# Patient Record
Sex: FEMALE | Race: WHITE | NOT HISPANIC OR LATINO | Employment: UNEMPLOYED | URBAN - METROPOLITAN AREA
[De-identification: names, ages, dates, MRNs, and addresses within clinical notes are randomized per-mention and may not be internally consistent; named-entity substitution may affect disease eponyms.]

---

## 2017-05-31 ENCOUNTER — RECORDS - HEALTHEAST (OUTPATIENT)
Dept: LAB | Facility: CLINIC | Age: 62
End: 2017-05-31

## 2017-06-01 LAB
CHOLEST SERPL-MCNC: 152 MG/DL
FASTING STATUS PATIENT QL REPORTED: NORMAL
HDLC SERPL-MCNC: 51 MG/DL
LDLC SERPL CALC-MCNC: 73 MG/DL
TRIGL SERPL-MCNC: 139 MG/DL

## 2017-09-10 ENCOUNTER — HEALTH MAINTENANCE LETTER (OUTPATIENT)
Age: 62
End: 2017-09-10

## 2018-01-02 ENCOUNTER — RECORDS - HEALTHEAST (OUTPATIENT)
Dept: LAB | Facility: CLINIC | Age: 63
End: 2018-01-02

## 2018-01-02 LAB — INR PPP: 2.2 (ref 0.9–1.1)

## 2018-02-02 ENCOUNTER — RECORDS - HEALTHEAST (OUTPATIENT)
Dept: LAB | Facility: CLINIC | Age: 63
End: 2018-02-02

## 2018-02-02 LAB — INR PPP: 2.4 (ref 0.9–1.1)

## 2018-02-09 ENCOUNTER — RECORDS - HEALTHEAST (OUTPATIENT)
Dept: LAB | Facility: CLINIC | Age: 63
End: 2018-02-09

## 2018-02-09 LAB
ALBUMIN UR-MCNC: NEGATIVE MG/DL
APPEARANCE UR: ABNORMAL
BACTERIA #/AREA URNS HPF: ABNORMAL HPF
BILIRUB UR QL STRIP: NEGATIVE
COLOR UR AUTO: YELLOW
GLUCOSE UR STRIP-MCNC: ABNORMAL MG/DL
HGB UR QL STRIP: ABNORMAL
KETONES UR STRIP-MCNC: NEGATIVE MG/DL
LEUKOCYTE ESTERASE UR QL STRIP: ABNORMAL
MUCOUS THREADS #/AREA URNS LPF: ABNORMAL LPF
NITRATE UR QL: NEGATIVE
PH UR STRIP: 5.5 [PH] (ref 4.5–8)
RBC #/AREA URNS AUTO: ABNORMAL HPF
SP GR UR STRIP: 1.03 (ref 1–1.03)
SQUAMOUS #/AREA URNS AUTO: >100 LPF
UROBILINOGEN UR STRIP-ACNC: ABNORMAL
WBC #/AREA URNS AUTO: ABNORMAL HPF

## 2018-02-11 LAB — BACTERIA SPEC CULT: ABNORMAL

## 2018-02-19 ENCOUNTER — RECORDS - HEALTHEAST (OUTPATIENT)
Dept: LAB | Facility: CLINIC | Age: 63
End: 2018-02-19

## 2018-02-19 LAB — INR PPP: 2.34 (ref 0.9–1.1)

## 2018-03-02 ENCOUNTER — RECORDS - HEALTHEAST (OUTPATIENT)
Dept: LAB | Facility: CLINIC | Age: 63
End: 2018-03-02

## 2018-03-02 LAB — INR PPP: 2.15 (ref 0.9–1.1)

## 2018-04-02 ENCOUNTER — RECORDS - HEALTHEAST (OUTPATIENT)
Dept: LAB | Facility: CLINIC | Age: 63
End: 2018-04-02

## 2018-04-02 LAB — INR PPP: 2.03 (ref 0.9–1.1)

## 2018-04-26 ENCOUNTER — RECORDS - HEALTHEAST (OUTPATIENT)
Dept: LAB | Facility: CLINIC | Age: 63
End: 2018-04-26

## 2018-04-26 LAB
ERYTHROCYTE [DISTWIDTH] IN BLOOD BY AUTOMATED COUNT: 15.3 % (ref 11–14.5)
ERYTHROCYTE [SEDIMENTATION RATE] IN BLOOD BY WESTERGREN METHOD: 122 MM/HR (ref 0–20)
HCT VFR BLD AUTO: 26.9 % (ref 35–47)
HGB BLD-MCNC: 8.1 G/DL (ref 12–16)
INR PPP: 1.75 (ref 0.9–1.1)
MCH RBC QN AUTO: 30.1 PG (ref 27–34)
MCHC RBC AUTO-ENTMCNC: 30.1 G/DL (ref 32–36)
MCV RBC AUTO: 100 FL (ref 80–100)
PLATELET # BLD AUTO: 275 THOU/UL (ref 140–440)
PMV BLD AUTO: 9.1 FL (ref 8.5–12.5)
RBC # BLD AUTO: 2.69 MILL/UL (ref 3.8–5.4)
WBC: 12 THOU/UL (ref 4–11)

## 2018-04-27 ENCOUNTER — RECORDS - HEALTHEAST (OUTPATIENT)
Dept: ADMINISTRATIVE | Facility: OTHER | Age: 63
End: 2018-04-27

## 2018-04-27 ENCOUNTER — RECORDS - HEALTHEAST (OUTPATIENT)
Dept: LAB | Facility: CLINIC | Age: 63
End: 2018-04-27

## 2018-04-27 LAB
ALBUMIN UR-MCNC: NEGATIVE MG/DL
APPEARANCE UR: CLEAR
BACTERIA #/AREA URNS HPF: ABNORMAL HPF
BILIRUB UR QL STRIP: NEGATIVE
COLOR UR AUTO: YELLOW
GLUCOSE UR STRIP-MCNC: ABNORMAL MG/DL
HGB UR QL STRIP: NEGATIVE
KETONES UR STRIP-MCNC: NEGATIVE MG/DL
LEUKOCYTE ESTERASE UR QL STRIP: ABNORMAL
NITRATE UR QL: NEGATIVE
PH UR STRIP: 6 [PH] (ref 4.5–8)
RBC #/AREA URNS AUTO: ABNORMAL HPF
SP GR UR STRIP: 1.02 (ref 1–1.03)
SQUAMOUS #/AREA URNS AUTO: ABNORMAL LPF
UROBILINOGEN UR STRIP-ACNC: ABNORMAL
WBC #/AREA URNS AUTO: ABNORMAL HPF

## 2018-04-30 LAB
BACTERIA SPEC CULT: ABNORMAL
BACTERIA SPEC CULT: ABNORMAL

## 2018-05-01 ENCOUNTER — RECORDS - HEALTHEAST (OUTPATIENT)
Dept: LAB | Facility: CLINIC | Age: 63
End: 2018-05-01

## 2018-05-02 LAB — INR PPP: 1.25 (ref 0.9–1.1)

## 2018-05-07 ENCOUNTER — RECORDS - HEALTHEAST (OUTPATIENT)
Dept: ADMINISTRATIVE | Facility: OTHER | Age: 63
End: 2018-05-07

## 2018-05-21 ENCOUNTER — RECORDS - HEALTHEAST (OUTPATIENT)
Dept: ADMINISTRATIVE | Facility: OTHER | Age: 63
End: 2018-05-21

## 2018-05-22 ENCOUNTER — RECORDS - HEALTHEAST (OUTPATIENT)
Dept: LAB | Facility: CLINIC | Age: 63
End: 2018-05-22

## 2018-05-22 LAB
C REACTIVE PROTEIN LHE: 5.3 MG/DL (ref 0–0.8)
ERYTHROCYTE [SEDIMENTATION RATE] IN BLOOD BY WESTERGREN METHOD: 106 MM/HR (ref 0–20)
WBC: 11 THOU/UL (ref 4–11)

## 2018-05-24 ENCOUNTER — RECORDS - HEALTHEAST (OUTPATIENT)
Dept: ADMINISTRATIVE | Facility: OTHER | Age: 63
End: 2018-05-24

## 2018-05-24 ENCOUNTER — RECORDS - HEALTHEAST (OUTPATIENT)
Dept: LAB | Facility: CLINIC | Age: 63
End: 2018-05-24

## 2018-05-24 LAB
C REACTIVE PROTEIN LHE: 4.1 MG/DL (ref 0–0.8)
ERYTHROCYTE [SEDIMENTATION RATE] IN BLOOD BY WESTERGREN METHOD: 112 MM/HR (ref 0–20)
WBC: 10 THOU/UL (ref 4–11)

## 2018-06-07 ENCOUNTER — RECORDS - HEALTHEAST (OUTPATIENT)
Dept: ADMINISTRATIVE | Facility: OTHER | Age: 63
End: 2018-06-07

## 2018-06-07 ENCOUNTER — OFFICE VISIT - HEALTHEAST (OUTPATIENT)
Dept: VASCULAR SURGERY | Facility: CLINIC | Age: 63
End: 2018-06-07

## 2018-06-07 DIAGNOSIS — J44.9 CHRONIC OBSTRUCTIVE PULMONARY DISEASE, UNSPECIFIED COPD TYPE (H): ICD-10-CM

## 2018-06-07 DIAGNOSIS — G90.09 OTHER IDIOPATHIC PERIPHERAL AUTONOMIC NEUROPATHY: ICD-10-CM

## 2018-06-07 DIAGNOSIS — K74.60 CIRRHOSIS OF LIVER (H): ICD-10-CM

## 2018-06-07 DIAGNOSIS — G89.29 OTHER CHRONIC PAIN: ICD-10-CM

## 2018-06-07 DIAGNOSIS — T81.30XA WOUND DISRUPTION, INITIAL ENCOUNTER: ICD-10-CM

## 2018-06-07 DIAGNOSIS — L98.429 SKIN ULCER OF SACRUM, UNSPECIFIED ULCER STAGE (H): ICD-10-CM

## 2018-06-07 DIAGNOSIS — E11.9 DIABETES (H): ICD-10-CM

## 2018-06-07 DIAGNOSIS — I82.91 CHRONIC EMBOLISM AND THROMBOSIS OF UNSPECIFIED VEIN: ICD-10-CM

## 2018-06-07 DIAGNOSIS — D64.9 ANEMIA: ICD-10-CM

## 2018-06-07 DIAGNOSIS — F32.9 MAJOR DEPRESSIVE DISORDER: ICD-10-CM

## 2018-06-07 DIAGNOSIS — F31.9 BIPOLAR 1 DISORDER (H): ICD-10-CM

## 2018-06-21 ENCOUNTER — RECORDS - HEALTHEAST (OUTPATIENT)
Dept: ADMINISTRATIVE | Facility: OTHER | Age: 63
End: 2018-06-21

## 2018-06-21 ENCOUNTER — OFFICE VISIT - HEALTHEAST (OUTPATIENT)
Dept: VASCULAR SURGERY | Facility: CLINIC | Age: 63
End: 2018-06-21

## 2018-06-21 DIAGNOSIS — F60.9 PERSONALITY DISORDER (H): ICD-10-CM

## 2018-06-21 DIAGNOSIS — G90.09 OTHER IDIOPATHIC PERIPHERAL AUTONOMIC NEUROPATHY: ICD-10-CM

## 2018-06-21 DIAGNOSIS — T14.8XXA LOCAL INFECTION OF WOUND: ICD-10-CM

## 2018-06-21 DIAGNOSIS — F32.9 MAJOR DEPRESSIVE DISORDER: ICD-10-CM

## 2018-06-21 DIAGNOSIS — E66.9 OBESITY (BMI 30-39.9): ICD-10-CM

## 2018-06-21 DIAGNOSIS — L08.9 LOCAL INFECTION OF WOUND: ICD-10-CM

## 2018-06-21 DIAGNOSIS — D64.9 ANEMIA, UNSPECIFIED TYPE: ICD-10-CM

## 2018-06-21 DIAGNOSIS — M54.16 LUMBAR RADICULOPATHY, CHRONIC: ICD-10-CM

## 2018-06-21 DIAGNOSIS — F31.9 BIPOLAR 1 DISORDER (H): ICD-10-CM

## 2018-06-24 LAB
BACTERIA SPEC CULT: ABNORMAL
BACTERIA SPEC CULT: ABNORMAL
BACTERIA SPEC CULT: NORMAL
GRAM STAIN RESULT: ABNORMAL
GRAM STAIN RESULT: ABNORMAL

## 2018-06-25 ENCOUNTER — AMBULATORY - HEALTHEAST (OUTPATIENT)
Dept: VASCULAR SURGERY | Facility: CLINIC | Age: 63
End: 2018-06-25

## 2018-06-25 ENCOUNTER — COMMUNICATION - HEALTHEAST (OUTPATIENT)
Dept: VASCULAR SURGERY | Facility: CLINIC | Age: 63
End: 2018-06-25

## 2018-06-25 DIAGNOSIS — L08.9 LOCAL INFECTION OF WOUND: ICD-10-CM

## 2018-06-25 DIAGNOSIS — T14.8XXA LOCAL INFECTION OF WOUND: ICD-10-CM

## 2018-06-28 ENCOUNTER — OFFICE VISIT - HEALTHEAST (OUTPATIENT)
Dept: VASCULAR SURGERY | Facility: CLINIC | Age: 63
End: 2018-06-28

## 2018-06-28 DIAGNOSIS — D64.9 ANEMIA, UNSPECIFIED TYPE: ICD-10-CM

## 2018-06-28 DIAGNOSIS — F31.9 BIPOLAR 1 DISORDER (H): ICD-10-CM

## 2018-06-28 DIAGNOSIS — F60.9 PERSONALITY DISORDER (H): ICD-10-CM

## 2018-06-28 DIAGNOSIS — E66.9 OBESITY (BMI 30-39.9): ICD-10-CM

## 2018-06-28 DIAGNOSIS — L98.429 SKIN ULCER OF SACRUM, UNSPECIFIED ULCER STAGE (H): ICD-10-CM

## 2018-06-28 DIAGNOSIS — L08.9 LOCAL INFECTION OF WOUND: ICD-10-CM

## 2018-06-28 DIAGNOSIS — E11.9 DIABETES (H): ICD-10-CM

## 2018-06-28 DIAGNOSIS — T14.8XXA LOCAL INFECTION OF WOUND: ICD-10-CM

## 2018-06-28 DIAGNOSIS — T81.30XD WOUND DISRUPTION, SUBSEQUENT ENCOUNTER: ICD-10-CM

## 2018-07-06 ENCOUNTER — RECORDS - HEALTHEAST (OUTPATIENT)
Dept: LAB | Facility: CLINIC | Age: 63
End: 2018-07-06

## 2018-07-09 LAB
C REACTIVE PROTEIN LHE: 2.4 MG/DL (ref 0–0.8)
ERYTHROCYTE [SEDIMENTATION RATE] IN BLOOD BY WESTERGREN METHOD: 94 MM/HR (ref 0–20)
WBC: 9.2 THOU/UL (ref 4–11)

## 2018-07-10 ENCOUNTER — OFFICE VISIT - HEALTHEAST (OUTPATIENT)
Dept: VASCULAR SURGERY | Facility: CLINIC | Age: 63
End: 2018-07-10

## 2018-07-10 DIAGNOSIS — F31.9 BIPOLAR 1 DISORDER (H): ICD-10-CM

## 2018-07-10 DIAGNOSIS — D64.9 ANEMIA, UNSPECIFIED TYPE: ICD-10-CM

## 2018-07-10 DIAGNOSIS — E66.9 OBESITY (BMI 30-39.9): ICD-10-CM

## 2018-07-10 DIAGNOSIS — F60.9 PERSONALITY DISORDER (H): ICD-10-CM

## 2018-07-10 DIAGNOSIS — T81.30XD WOUND DISRUPTION, SUBSEQUENT ENCOUNTER: ICD-10-CM

## 2018-07-10 DIAGNOSIS — E11.9 DIABETES (H): ICD-10-CM

## 2018-07-10 ASSESSMENT — MIFFLIN-ST. JEOR: SCORE: 1468.95

## 2018-07-17 ENCOUNTER — RECORDS - HEALTHEAST (OUTPATIENT)
Dept: LAB | Facility: CLINIC | Age: 63
End: 2018-07-17

## 2018-07-18 LAB
C REACTIVE PROTEIN LHE: 2.2 MG/DL (ref 0–0.8)
ERYTHROCYTE [SEDIMENTATION RATE] IN BLOOD BY WESTERGREN METHOD: 99 MM/HR (ref 0–20)
WBC: 9.2 THOU/UL (ref 4–11)

## 2018-08-02 ENCOUNTER — OFFICE VISIT - HEALTHEAST (OUTPATIENT)
Dept: VASCULAR SURGERY | Facility: CLINIC | Age: 63
End: 2018-08-02

## 2018-08-02 DIAGNOSIS — D64.9 ANEMIA, UNSPECIFIED TYPE: ICD-10-CM

## 2018-08-02 DIAGNOSIS — E66.9 OBESITY (BMI 30-39.9): ICD-10-CM

## 2018-08-02 DIAGNOSIS — E11.9 DIABETES (H): ICD-10-CM

## 2018-08-02 DIAGNOSIS — F31.9 BIPOLAR 1 DISORDER (H): ICD-10-CM

## 2018-08-02 DIAGNOSIS — T81.30XD WOUND DISRUPTION, SUBSEQUENT ENCOUNTER: ICD-10-CM

## 2018-08-23 ENCOUNTER — OFFICE VISIT - HEALTHEAST (OUTPATIENT)
Dept: VASCULAR SURGERY | Facility: CLINIC | Age: 63
End: 2018-08-23

## 2018-08-23 DIAGNOSIS — L08.9 LOCAL INFECTION OF WOUND: ICD-10-CM

## 2018-08-23 DIAGNOSIS — D64.9 ANEMIA, UNSPECIFIED TYPE: ICD-10-CM

## 2018-08-23 DIAGNOSIS — F31.9 BIPOLAR 1 DISORDER (H): ICD-10-CM

## 2018-08-23 DIAGNOSIS — T81.30XD WOUND DISRUPTION, SUBSEQUENT ENCOUNTER: ICD-10-CM

## 2018-08-23 DIAGNOSIS — E66.9 OBESITY (BMI 30-39.9): ICD-10-CM

## 2018-08-23 DIAGNOSIS — E11.9 DIABETES (H): ICD-10-CM

## 2018-08-23 DIAGNOSIS — E66.9 OBESITY: ICD-10-CM

## 2018-08-23 DIAGNOSIS — F60.9 PERSONALITY DISORDER (H): ICD-10-CM

## 2018-08-23 DIAGNOSIS — T14.8XXA LOCAL INFECTION OF WOUND: ICD-10-CM

## 2018-08-26 LAB
BACTERIA SPEC CULT: ABNORMAL
GRAM STAIN RESULT: ABNORMAL
GRAM STAIN RESULT: ABNORMAL

## 2018-08-27 ENCOUNTER — AMBULATORY - HEALTHEAST (OUTPATIENT)
Dept: VASCULAR SURGERY | Facility: CLINIC | Age: 63
End: 2018-08-27

## 2018-08-27 DIAGNOSIS — L08.9 LOCAL INFECTION OF WOUND: ICD-10-CM

## 2018-08-27 DIAGNOSIS — T14.8XXA LOCAL INFECTION OF WOUND: ICD-10-CM

## 2018-08-28 ENCOUNTER — COMMUNICATION - HEALTHEAST (OUTPATIENT)
Dept: VASCULAR SURGERY | Facility: CLINIC | Age: 63
End: 2018-08-28

## 2018-09-05 ENCOUNTER — COMMUNICATION - HEALTHEAST (OUTPATIENT)
Dept: VASCULAR SURGERY | Facility: CLINIC | Age: 63
End: 2018-09-05

## 2018-09-10 ENCOUNTER — RECORDS - HEALTHEAST (OUTPATIENT)
Dept: LAB | Facility: CLINIC | Age: 63
End: 2018-09-10

## 2018-09-10 LAB
C REACTIVE PROTEIN LHE: 1.2 MG/DL (ref 0–0.8)
ERYTHROCYTE [SEDIMENTATION RATE] IN BLOOD BY WESTERGREN METHOD: 71 MM/HR (ref 0–20)
WBC: 9.7 THOU/UL (ref 4–11)

## 2018-09-11 ENCOUNTER — COMMUNICATION - HEALTHEAST (OUTPATIENT)
Dept: VASCULAR SURGERY | Facility: CLINIC | Age: 63
End: 2018-09-11

## 2018-09-13 ENCOUNTER — OFFICE VISIT - HEALTHEAST (OUTPATIENT)
Dept: VASCULAR SURGERY | Facility: CLINIC | Age: 63
End: 2018-09-13

## 2018-09-13 DIAGNOSIS — D64.9 ANEMIA, UNSPECIFIED TYPE: ICD-10-CM

## 2018-09-13 DIAGNOSIS — T81.30XD WOUND DISRUPTION, SUBSEQUENT ENCOUNTER: ICD-10-CM

## 2018-09-13 DIAGNOSIS — E11.9 DIABETES (H): ICD-10-CM

## 2018-09-13 DIAGNOSIS — F31.9 BIPOLAR 1 DISORDER (H): ICD-10-CM

## 2018-09-13 DIAGNOSIS — E66.9 OBESITY (BMI 30-39.9): ICD-10-CM

## 2018-09-13 ASSESSMENT — MIFFLIN-ST. JEOR: SCORE: 1391.83

## 2018-10-04 ENCOUNTER — OFFICE VISIT - HEALTHEAST (OUTPATIENT)
Dept: VASCULAR SURGERY | Facility: CLINIC | Age: 63
End: 2018-10-04

## 2018-10-04 DIAGNOSIS — D64.9 ANEMIA, UNSPECIFIED TYPE: ICD-10-CM

## 2018-10-04 DIAGNOSIS — E11.9 DIABETES (H): ICD-10-CM

## 2018-10-04 DIAGNOSIS — T81.30XD WOUND DISRUPTION, SUBSEQUENT ENCOUNTER: ICD-10-CM

## 2018-10-04 DIAGNOSIS — E66.9 OBESITY (BMI 30-39.9): ICD-10-CM

## 2018-10-04 DIAGNOSIS — F31.9 BIPOLAR 1 DISORDER (H): ICD-10-CM

## 2019-07-03 ENCOUNTER — RECORDS - HEALTHEAST (OUTPATIENT)
Dept: LAB | Facility: CLINIC | Age: 64
End: 2019-07-03

## 2019-07-03 LAB
C REACTIVE PROTEIN LHE: 2.4 MG/DL (ref 0–0.8)
ERYTHROCYTE [DISTWIDTH] IN BLOOD BY AUTOMATED COUNT: 13.1 % (ref 11–14.5)
ERYTHROCYTE [SEDIMENTATION RATE] IN BLOOD BY WESTERGREN METHOD: 47 MM/HR (ref 0–20)
HCT VFR BLD AUTO: 38.4 % (ref 35–47)
HGB BLD-MCNC: 12.6 G/DL (ref 12–16)
MCH RBC QN AUTO: 31 PG (ref 27–34)
MCHC RBC AUTO-ENTMCNC: 32.8 G/DL (ref 32–36)
MCV RBC AUTO: 95 FL (ref 80–100)
PLATELET # BLD AUTO: 101 THOU/UL (ref 140–440)
PMV BLD AUTO: 10.3 FL (ref 8.5–12.5)
RBC # BLD AUTO: 4.06 MILL/UL (ref 3.8–5.4)
WBC: 7.9 THOU/UL (ref 4–11)

## 2019-07-10 ENCOUNTER — RECORDS - HEALTHEAST (OUTPATIENT)
Dept: LAB | Facility: CLINIC | Age: 64
End: 2019-07-10

## 2019-07-13 LAB
BACTERIA SPEC CULT: ABNORMAL
BACTERIA SPEC CULT: ABNORMAL

## 2020-04-30 ENCOUNTER — TRANSFERRED RECORDS (OUTPATIENT)
Dept: HEALTH INFORMATION MANAGEMENT | Facility: CLINIC | Age: 65
End: 2020-04-30

## 2020-05-07 ENCOUNTER — TRANSFERRED RECORDS (OUTPATIENT)
Dept: HEALTH INFORMATION MANAGEMENT | Facility: CLINIC | Age: 65
End: 2020-05-07

## 2020-07-14 ENCOUNTER — TRANSFERRED RECORDS (OUTPATIENT)
Dept: HEALTH INFORMATION MANAGEMENT | Facility: CLINIC | Age: 65
End: 2020-07-14

## 2020-08-31 ENCOUNTER — TELEPHONE (OUTPATIENT)
Dept: DERMATOLOGY | Facility: CLINIC | Age: 65
End: 2020-08-31

## 2020-08-31 NOTE — TELEPHONE ENCOUNTER
I see no referral for this Pt but she has been sent to us with a Dx of skin Cancer based on her Bx results in Care Everywhere.    Please review in clinic and call Pt to schedule

## 2020-09-08 NOTE — TELEPHONE ENCOUNTER
FUTURE VISIT INFORMATION      FUTURE VISIT INFORMATION:    Date: 9.21.20    Time: 3:00    Location: Telephone  REFERRAL INFORMATION:    Referring provider:  Dr. Armando Riley    Referring providers clinic:  [facility]    Reason for visit/diagnosis  Call: [phone] SCC    RECORDS REQUESTED FROM:       Clinic name Comments Records Status Photos Status   [facility] 7.14.20  Dr. Riley [facility] 5.7.20  Dr. Natividad Allison  Path # [id number] Epic N/A

## 2020-09-21 ENCOUNTER — PRE VISIT (OUTPATIENT)
Dept: DERMATOLOGY | Facility: CLINIC | Age: 65
End: 2020-09-21

## 2020-09-21 ENCOUNTER — VIRTUAL VISIT (OUTPATIENT)
Dept: DERMATOLOGY | Facility: CLINIC | Age: 65
End: 2020-09-21
Payer: COMMERCIAL

## 2020-09-21 DIAGNOSIS — D09.9 SQUAMOUS CELL CARCINOMA IN SITU: Primary | ICD-10-CM

## 2020-09-21 ASSESSMENT — PAIN SCALES - GENERAL: PAINLEVEL: SEVERE PAIN (6)

## 2020-09-21 NOTE — LETTER
9/21/2020       RE: Ginna Mireles  [address]  [facility] [address]     Dear Colleague,    Thank you for referring your patient, Ginna Mireles, to the [facility] DERMATOLOGIC SURGERY at [facility]. Please see a copy of my visit note below.    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    Encounter Date: Sep 21, 2020    Chief complaint:   Chief Complaint   Patient presents with     Derm Problem     consult SCCIS mons pubis       I spoke with: Ginna Mireles    The reason for the telephone visit was:   For consultation regarding MMS for SCCis of the mons pubis, biopsied on 4/30/2020.     Pertinent history and review of systems:  Patient feeling well. No fevers, chills, other concerning lesions of the skin.     Assessment/Advice/instructions given to patient/guardian including prescriptions, follow up appointment or orders for diagnostic testing:    (1) Squamous cell carcinoma in situ, mons pubis, s/p biopsy 4/30/2020  - Discussed the nature of the diagnosis/condition  - Discussed the treatment options, including the risks benefits and expectations of these options.   - Recommended micrographic surgery, patient agrees to proceed with scheduling    RTC within 3-6 weeks for MMS    Phone call contact time:  Call Started at: 1425  Call Ended at: 1435    Staff and fellow:    Juanito Hirsch MD  PGY-6    Micrographic Surgery and Dermatologic Oncology Fellow  September 21, 2020    Attending Attestation:  I personally interviewed and examined the patient.  The patient was discussed with the resident.  I reviewed the resident note and agree with the findings.  Any edits are below.    History: SCCIS on mons    PE: 2 cm scaling nodule per outside documentation    A/P: SCCIS in H risk location-- schedule  Mohs Ian A. Maher M.D.  Professor  Director of Dermatologic Surgery  Department of Dermatology

## 2020-09-21 NOTE — PROGRESS NOTES
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    Encounter Date: Sep 21, 2020    Chief complaint:   Chief Complaint   Patient presents with     Derm Problem     consult SCCIS mons pubis       I spoke with: Ginna Mireles    The reason for the telephone visit was:   For consultation regarding MMS for SCCis of the mons pubis, biopsied on 4/30/2020.     Pertinent history and review of systems:  Patient feeling well. No fevers, chills, other concerning lesions of the skin.     Assessment/Advice/instructions given to patient/guardian including prescriptions, follow up appointment or orders for diagnostic testing:    (1) Squamous cell carcinoma in situ, mons pubis, s/p biopsy 4/30/2020  - Discussed the nature of the diagnosis/condition  - Discussed the treatment options, including the risks benefits and expectations of these options.   - Recommended micrographic surgery, patient agrees to proceed with scheduling    RTC within 3-6 weeks for MMS    Phone call contact time:  Call Started at: 1425  Call Ended at: 1435    Staff and fellow:    Juanito Hirsch MD  PGY-6    Micrographic Surgery and Dermatologic Oncology Fellow  September 21, 2020    Attending Attestation:  I personally interviewed and examined the patient.  The patient was discussed with the resident.  I reviewed the resident note and agree with the findings.  Any edits are below.    History: SCCIS on mons    PE: 2 cm scaling nodule per outside documentation    A/P: SCCIS in H risk location-- schedule Mohs Ian A. Maher M.D.  Professor  Director of Dermatologic Surgery  Department of Dermatology

## 2020-09-21 NOTE — NURSING NOTE
Chief Complaint   Patient presents with     Derm Problem     consult SCCIS mons pubis     Flores Salgado, EMT

## 2020-09-21 NOTE — PATIENT INSTRUCTIONS
[facility] Dermatology Visit    Thank you for allowing us to participate in your care. Your findings, instructions and follow-up plan are as follows:      We will schedule you for Mohs surgery    When should I call my doctor?    If you are worsening or not improving, please, contact us or seek urgent care as noted below.     Who should I call with questions (adults)?    [facility] (adult and pediatric): [phone]     [facility] (adult): [phone]    For urgent needs outside of business hours call the [facility] at [phone] and ask for the dermatology resident on call    If this is a medical emergency and you are unable to reach an ER, Call 911      Who should I call with questions (pediatric)?  [facility]- Pediatric Dermatology  Dr. Osiris Blancas, Dr. Марина Benson, Dr. Talisha Allen, Norma Rosario, PA  Dr. Betzaida Pate, Dr. Abimbola Davey & Dr. Charlie Haddad  Non Urgent  Nurse Triage Line; [phone]- Ely and Jo RN Care Coordinators   Echo (/Complex ) [phone]    If you need a prescription refill, please contact your pharmacy. Refills are approved or denied by our Physicians during normal business hours, Monday through Fridays  Per office policy, refills will not be granted if you have not been seen within the past year (or sooner depending on your child's condition)    Scheduling Information:  Pediatric Appointment Scheduling and Call Center [phone]  Radiology Scheduling- [phone]  Sedation Unit Scheduling- [phone]  [place] Scheduling- General [phone]; Pediatric Dermatology [phone]  Main  Services: [phone]  Trinidadian: [phone]  Yemeni: [phone]  Hmong/Lukas/Hebrew: [phone]  Preadmission Nursing Department Fax Number: [phone] (Fax all pre-operative paperwork to this number)    For  urgent matters arising during evenings, weekends, or holidays that cannot wait for normal business hours please call [phone] and ask for the Dermatology Resident On-Call to be paged.

## 2020-09-28 ENCOUNTER — OFFICE VISIT (OUTPATIENT)
Dept: DERMATOLOGY | Facility: CLINIC | Age: 65
End: 2020-09-28
Payer: COMMERCIAL

## 2020-09-28 VITALS — DIASTOLIC BLOOD PRESSURE: 75 MMHG | SYSTOLIC BLOOD PRESSURE: 135 MMHG | HEART RATE: 104 BPM

## 2020-09-28 DIAGNOSIS — D04.9 SQUAMOUS CELL CARCINOMA IN SITU OF SKIN: Primary | ICD-10-CM

## 2020-09-28 ASSESSMENT — PAIN SCALES - GENERAL: PAINLEVEL: NO PAIN (0)

## 2020-09-28 NOTE — PATIENT INSTRUCTIONS
Wound Care Instructions  I will experience scar, altered skin color, bleeding, swelling, pain, crusting and redness. I may experience altered sensation. Risks are excessive bleeding, infection, muscle weakness, thick (hypertrophic or keloidal) scar, and recurrence,. A second procedure may be recommended to obtain the best cosmetic or functional result.  Possible complications of any surgical procedure are bleeding, infection, scarring, alteration in skin color and sensation, muscle weakness in the area, wound dehiscence or seperation, or recurrence of the lesion or disease. On occasion, after healing, a secondary procedure or revision may be recommended in order to obtain the best cosmetic or functional result.   After your surgery, a pressure bandage will be placed over the area that has sutures. This will help prevent bleeding.   For the First 48 hours After Surgery:  1. Leave the pressure bandage on and keep it dry. If it should come loose, you may retape it, but do not take it off.  2. Relax and take it easy. Do not do any vigorous exercise, heavy lifting, or bending forward. This could cause the wound to bleed.  3. Post-operative pain is usually mild. You may take plain or extra strength Tylenol every 4 hours as needed (do not take more than 4,000mg in one day). Do not take any medicine that contains aspirin, ibuprofen or motrin unless you have been recommended these by a doctor.  Avoid alcohol and vitamin E as these may increase your tendency to bleed.  4. You may put an ice pack around the bandaged area for 20 minutes every 2-3 hours. This may help reduce swelling, bruising, and pain. Make sure the ice pack is waterproof so that the pressure bandage does not get wet.   5. You may see a small amount of drainage or blood on your pressure bandage. This is normal. However, if drainage or bleeding continues or saturates the bandage, you will need to apply firm pressure over the bandage with a washcloth for 15  minutes. If bleeding continues after applying pressure for 15 minutes then go to the nearest emergency room.  48 Hours After Surgery  Carefully remove the bandage and start daily wound care and dressing changes. You may also now shower and get the wound wet. Wash wound with a mild soap and water.  Use caution when washing the wound. Be gentle and do not let the forceful shower stream hit the wound directly.  PAT dry.  Daily Wound Care:  1. Wash wound with a mild soap and water.  Use caution when washing the wound, be gentle and do not let the forceful shower stream hit the wound directly.  2. PAT DRY.  3. Apply Vaseline (from a new container or tube) over the suture line with a Q-tip. It is very important to keep the wound continuously moist, as wounds heal best in a moist environment.  4.  Keep the site covered until sutures are removed, you can cover it with a Telfa (non-stick) dressing and tape or a band-aid.    5. If you are unable to keep wound covered, you must apply Vaseline every 2 - 3 hours (while awake) to ensure it is being kept moist for optimal healing. A dressing overnight is recommended to keep the area moist.   Call Us If:  1. You have pain that is not controlled with Tylenol.  2. You have signs or symptoms of an infection, such as: fever over 100 degrees F, redness, warmth, or foul-smelling or yellow/creamy drainage from the wound.  Who should I call with questions?    [facility]: [phone]     [facility]: [phone]    For urgent needs outside of business hours call the [facility] at [phone] and ask for the dermatology resident on call

## 2020-09-28 NOTE — NURSING NOTE
Chief Complaint   Patient presents with     Derm Problem     Ginna is here today for MOHS on the Mons Pubis for JONATHAN CAMPO on 9/28/2020 at 8:11 AM

## 2020-09-28 NOTE — PROGRESS NOTES
[facility] Mohs Dermatologic Surgery Procedure Note    Date of Service:  Sep 28, 2020  Surgery: Mohs micrographic surgery    Case 1  Repair Type: 2.5 x 2.0  Repair Size: 7.0 cm  Suture Material: 4-0   Tumor Type: SCCI - Squamous cell carcinoma in situ  Location: mons pubis  Derm-Path Accession #: Outside Accession [id number]  PreOp Size: 2.5 x 2.0 cm  PostOp Size: 4.0 x 3.7 cm  Mohs Accession #:   Level of Defect: Fat      Procedure:  We discussed the principles of treatment and most likely complications including scarring, bleeding, infection, swelling, pain, crusting, nerve damage, large wound,  incomplete excision, wound dehiscence,  nerve damage, recurrence, and a second procedure may be recommended to obtain the best cosmetic or functional result.    Informed consent was obtained and the patient underwent the procedure as follows:  The patient was placed supine on the operating table.  The cancer was identified, outlined with a marker, and verified by the patient.  The entire surgical field was prepped with Hibiclens.  The surgical site was anesthetized using Lidocaine 1% with epi 1:100,000.      The area of clinically apparent tumor was debulked. The layer of tissue was then surgically excised using a #15 blade and was then transferred onto a specimen sheet maintaining the orientation of the specimen. Hemostasis was obtained using heat cautery. The wound site was then covered with a dressing while the tissue samples were processed for examination.    The excised tissue was transported to the Mohs histology laboratory maintaining the tissue orientation.  The tissue specimen was relaxed so that the entire surgical margin was in a a single horizontal plane for sectioning and inked for precise mapping.  A precise reference map was drawn to reflect the sectioning of the specimen, colored inking of the margins, and orientation on the patient.  The tissue was processed using horizontal  sectioning of the base and continuous peripheral margins.  The histopathologic sections were reviewed in conjunction with the reference map.    Total blocks: 2    Total slides:  6    Residual tumor was identified and indicated in red on the reference map, identifying the location where further tissue excision was necessary. Therefore, an additional stage of Mohs Micrographic surgery was deemed necessary.     Stage II   The patient was returned to the operating room, and the area prepped in the usual manner. The residual tumor was excised using the reference map as a guide. The specimen was transfered to a labeled specimen sheet maintaining the orientation of the specimen. Hemostasis was obtained and the wound site was covered with a dressing while the tissue was processed for examination.     The excised tissue was transported to the Mohs histology laboratory maintaining orientation. The specimen margins were inked for precise mapping and a reference map was prepared for the is additional stage to maintain precise orientation as described above. The tissue was processed using horizontal sectioning of the base and continuous peripheral margins. The histopathologic sections were reviewed in conjunction with the reference map.     Total blocks: 3    Total slides:  9    There were no cancer cells visualized on examination, therefore Mohs surgery was complete.     REPAIR: An intermediate layered linear closure was selected as the procedure which would maximally preserve both function and cosmesis.    After the excision of the tumor, the area was carefully undermined. Hemostasis was obtained with electrocoagulation.  Closure was oriented so that the wound was in the patient's natural skin tension lines. 4-0 Monocryl and 5-0 Fast absorbing gut      Estimated blood loss was less than 10 ml for all surgical sites. A sterile pressure dressing was applied and wound care instructions, with a written handout, were given. The  patient was discharged from the Dermatologic Surgery Center alert and ambulatory.    Follow-up in 2 weeks for virtual visit.     Dr. pA Alvarado was immediately available for the entire surgery and was physicially present for the key portions of the procedure.    Juanito Hirsch MD  PGY-6    Micrographic Surgery and Dermatologic Oncology Fellow  September 28, 2020      Attending attestation:  I was present for key elements of the procedure and immediately available for all other portions of the procedure.  I have reviewed the note and edited it as necessary.    Ap Alvarado M.D.  Professor  Director of Dermatologic Surgery  Department of Dermatology  [facility]    Dermatology Surgery Clinic  [facility]  [address] [place], [place] [id number]

## 2020-09-28 NOTE — LETTER
9/28/2020       RE: Ginna Mireles  [address]  [place] [address]     Dear Colleague,    Thank you for referring your patient, Ginna iMreles, to the [facility]OLOG[facility]. Please see a copy of my visit note below.    [facility] Dermatologic Surgery Procedure Note    Date of Service:  Sep 28, 2020  Surgery: Mohs micrographic surgery    Case 1  Repair Type: 2.5 x 2.0  Repair Size: 7.0 cm  Suture Material: 4-0   Tumor Type: SCCI - Squamous cell carcinoma in situ  Location: mons pubis  Derm-Path Accession #: Outside Accession [id number]  PreOp Size: 2.5 x 2.0 cm  PostOp Size: 4.0 x 3.7 cm  Mohs Accession #:   Level of Defect: Fat      Procedure:  We discussed the principles of treatment and most likely complications including scarring, bleeding, infection, swelling, pain, crusting, nerve damage, large wound,  incomplete excision, wound dehiscence,  nerve damage, recurrence, and a second procedure may be recommended to obtain the best cosmetic or functional result.    Informed consent was obtained and the patient underwent the procedure as follows:  The patient was placed supine on the operating table.  The cancer was identified, outlined with a marker, and verified by the patient.  The entire surgical field was prepped with Hibiclens.  The surgical site was anesthetized using Lidocaine 1% with epi 1:100,000.      The area of clinically apparent tumor was debulked. The layer of tissue was then surgically excised using a #15 blade and was then transferred onto a specimen sheet maintaining the orientation of the specimen. Hemostasis was obtained using heat cautery. The wound site was then covered with a dressing while the tissue samples were processed for examination.    The excised tissue was transported to the Mohs histology laboratory maintaining the tissue orientation.  The tissue specimen was relaxed so that the entire surgical  margin was in a a single horizontal plane for sectioning and inked for precise mapping.  A precise reference map was drawn to reflect the sectioning of the specimen, colored inking of the margins, and orientation on the patient.  The tissue was processed using horizontal sectioning of the base and continuous peripheral margins.  The histopathologic sections were reviewed in conjunction with the reference map.    Total blocks: 2    Total slides:  6    Residual tumor was identified and indicated in red on the reference map, identifying the location where further tissue excision was necessary. Therefore, an additional stage of Mohs Micrographic surgery was deemed necessary.     Stage II   The patient was returned to the operating room, and the area prepped in the usual manner. The residual tumor was excised using the reference map as a guide. The specimen was transfered to a labeled specimen sheet maintaining the orientation of the specimen. Hemostasis was obtained and the wound site was covered with a dressing while the tissue was processed for examination.     The excised tissue was transported to the Mohs histology laboratory maintaining orientation. The specimen margins were inked for precise mapping and a reference map was prepared for the is additional stage to maintain precise orientation as described above. The tissue was processed using horizontal sectioning of the base and continuous peripheral margins. The histopathologic sections were reviewed in conjunction with the reference map.     Total blocks: 3    Total slides:  9    There were no cancer cells visualized on examination, therefore Mohs surgery was complete.     REPAIR: An intermediate layered linear closure was selected as the procedure which would maximally preserve both function and cosmesis.    After the excision of the tumor, the area was carefully undermined. Hemostasis was obtained with electrocoagulation.  Closure was oriented so that the wound  was in the patient's natural skin tension lines. 4-0 Monocryl and 5-0 Fast absorbing gut      Estimated blood loss was less than 10 ml for all surgical sites. A sterile pressure dressing was applied and wound care instructions, with a written handout, were given. The patient was discharged from the Dermatologic Surgery Center alert and ambulatory.    Follow-up in 2 weeks for virtual visit.     Dr. Ap Alvarado was immediately available for the entire surgery and was physicially present for the key portions of the procedure.    Juanito Hirsch MD  PGY-6    Micrographic Surgery and Dermatologic Oncology Fellow  September 28, 2020      Attending attestation:  I was present for key elements of the procedure and immediately available for all other portions of the procedure.  I have reviewed the note and edited it as necessary.    Ap Alvarado M.D.  Professor  Director of Dermatologic Surgery  Department of Dermatology  [facility]    Dermatology Surgery Clinic  [facility]  [address] [place] [id number]

## 2020-10-12 ENCOUNTER — ALLIED HEALTH/NURSE VISIT (OUTPATIENT)
Dept: DERMATOLOGY | Facility: CLINIC | Age: 65
End: 2020-10-12
Payer: COMMERCIAL

## 2020-10-12 DIAGNOSIS — D04.9 SQUAMOUS CELL CARCINOMA IN SITU OF SKIN: Primary | ICD-10-CM

## 2020-10-12 PROCEDURE — 99207 PR NO CHARGE NURSE ONLY: CPT | Performed by: DERMATOLOGY

## 2020-10-12 NOTE — PROGRESS NOTES
Ginna Mireles comes into clinic today at the request of Malika Alvarado Ordering Provider for wound check  This service provided today was under the supervising provider of the day Dr. Baca, who was available if needed.    Flores Salgado, EMT

## 2020-10-12 NOTE — NURSING NOTE
Chief Complaint   Patient presents with     Derm Problem     follow up mons pubis, no concerns, has been bandaging, keeping Vaseline on the area     Flores Salgado, EMT

## 2020-12-31 ENCOUNTER — RECORDS - HEALTHEAST (OUTPATIENT)
Dept: LAB | Facility: CLINIC | Age: 65
End: 2020-12-31

## 2020-12-31 LAB
SARS-COV-2 PCR COMMENT: NORMAL
SARS-COV-2 RNA SPEC QL NAA+PROBE: NEGATIVE
SARS-COV-2 VIRUS SPECIMEN SOURCE: NORMAL

## 2021-02-18 ENCOUNTER — RECORDS - HEALTHEAST (OUTPATIENT)
Dept: LAB | Facility: CLINIC | Age: 66
End: 2021-02-18

## 2021-02-25 ENCOUNTER — RECORDS - HEALTHEAST (OUTPATIENT)
Dept: LAB | Facility: CLINIC | Age: 66
End: 2021-02-25

## 2021-03-04 ENCOUNTER — RECORDS - HEALTHEAST (OUTPATIENT)
Dept: LAB | Facility: CLINIC | Age: 66
End: 2021-03-04

## 2021-03-11 ENCOUNTER — RECORDS - HEALTHEAST (OUTPATIENT)
Dept: LAB | Facility: CLINIC | Age: 66
End: 2021-03-11

## 2021-04-01 ENCOUNTER — RECORDS - HEALTHEAST (OUTPATIENT)
Dept: LAB | Facility: CLINIC | Age: 66
End: 2021-04-01

## 2021-04-08 ENCOUNTER — RECORDS - HEALTHEAST (OUTPATIENT)
Dept: LAB | Facility: CLINIC | Age: 66
End: 2021-04-08

## 2021-04-16 ENCOUNTER — RECORDS - HEALTHEAST (OUTPATIENT)
Dept: LAB | Facility: CLINIC | Age: 66
End: 2021-04-16

## 2021-04-22 ENCOUNTER — RECORDS - HEALTHEAST (OUTPATIENT)
Dept: LAB | Facility: CLINIC | Age: 66
End: 2021-04-22

## 2021-04-29 ENCOUNTER — RECORDS - HEALTHEAST (OUTPATIENT)
Dept: LAB | Facility: CLINIC | Age: 66
End: 2021-04-29

## 2021-05-06 ENCOUNTER — RECORDS - HEALTHEAST (OUTPATIENT)
Dept: LAB | Facility: CLINIC | Age: 66
End: 2021-05-06

## 2021-05-13 ENCOUNTER — RECORDS - HEALTHEAST (OUTPATIENT)
Dept: LAB | Facility: CLINIC | Age: 66
End: 2021-05-13

## 2021-05-20 ENCOUNTER — RECORDS - HEALTHEAST (OUTPATIENT)
Dept: LAB | Facility: CLINIC | Age: 66
End: 2021-05-20

## 2021-05-26 ENCOUNTER — RECORDS - HEALTHEAST (OUTPATIENT)
Dept: ADMINISTRATIVE | Facility: CLINIC | Age: 66
End: 2021-05-26

## 2021-06-01 VITALS — HEIGHT: 64 IN | WEIGHT: 207 LBS | BODY MASS INDEX: 35.34 KG/M2

## 2021-06-01 VITALS — BODY MASS INDEX: 32.44 KG/M2 | WEIGHT: 190 LBS | HEIGHT: 64 IN

## 2021-06-15 PROBLEM — G93.40 ACUTE ENCEPHALOPATHY: Status: ACTIVE | Noted: 2017-02-27

## 2021-06-15 PROBLEM — G89.29 CHRONIC PAIN: Status: ACTIVE | Noted: 2017-02-27

## 2021-06-15 PROBLEM — J18.9 PNEUMONIA: Status: ACTIVE | Noted: 2017-02-27
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
## 2021-06-15 PROBLEM — F31.9 BIPOLAR 1 DISORDER (H): Status: ACTIVE | Noted: 2017-02-27

## 2021-06-15 PROBLEM — E87.20 LACTIC ACIDOSIS: Status: ACTIVE | Noted: 2017-02-27

## 2021-06-15 PROBLEM — R74.01 TRANSAMINITIS: Status: ACTIVE | Noted: 2017-02-27

## 2021-06-16 PROBLEM — E16.2 HYPOGLYCEMIA: Status: ACTIVE | Noted: 2018-04-22

## 2021-06-16 PROBLEM — R50.9 FEVER: Status: ACTIVE | Noted: 2018-04-22

## 2021-06-16 PROBLEM — M43.16 SPONDYLOLISTHESIS OF LUMBAR REGION: Status: ACTIVE | Noted: 2018-06-07

## 2021-06-18 NOTE — PROGRESS NOTES
Date of Service:6/21/2018    Provider's initial consult visit:  6/7/2018    Chief Complaint:   Chief Complaint   Patient presents with     Wound Check     2 wk f/u       History:   Patient presents to clinic in regards to her back ulcer. She had back surgery on April 17 at [facility] for a anterior/posterior fusion L3-S1 with pelvic fixation.  She was started on clindamycin by ROMELIA Rice on 5/14/2018 for an incisional infection, which ended on 6/4/2018.  The  patient was last seen by me on 6/7/2018 for her initial consult when silversorb with nugauze was prescribed.  Today, she reports increased pain in her ulcer over the past week.  Additionally, her ulcer is draining more.       Current Outpatient Prescriptions   Medication Sig Dispense Refill     artificial tears,hypromellose, (GENTEAL) 0.3 % Drop Administer 1 drop to both eyes as needed.        blood glucose test (ACCU-CHEK ACTIVE TEST) strips Use 1 each As Directed 2 (two) times a day. Dispense brand per patient's insurance at pharmacy discretion.       brexpiprazole (REXULTI) 3 mg Tab tablet Take 3 mg by mouth daily.       calcium carbonate (CALCIUM 600) 600 mg calcium (1,500 mg) tablet 500 mg.       calcium, as carbonate, (TUMS) 200 mg calcium (500 mg) chewable tablet Chew 1 tablet 2 (two) times a day.       cetirizine (ZYRTEC) 10 MG tablet Take 10 mg by mouth daily.       cetirizine (ZYRTEC) 10 MG tablet Take 10 mg by mouth.       citalopram (CELEXA) 40 MG tablet Take 40 mg by mouth.       clindamycin (CLEOCIN) 300 MG capsule Take 1 capsule (300 mg total) by mouth 4 (four) times a day. 30 capsule 0     cyclobenzaprine (FLEXERIL) 10 MG tablet Take 10 mg by mouth 3 (three) times a day as needed for muscle spasms.       dextrose (GLUTOSE) 40 % gel Take 15 g by mouth as needed (Low blood sugar).       dextrose (GLUTOSE) 40 % gel Take 15 g by mouth.       diazePAM (VALIUM) 5 MG tablet        diphenhydrAMINE (BENADRYL) 25 mg capsule Tab 1 at bedtime as  needed for sleep       dulaglutide (TRULICITY) 1.5 mg/0.5 mL PnIj Inject 1.5 mg under the skin every 7 days.       emollient (EMOLLIENT) Crea Apply 1 application topically as needed.       emollient (EMOLLIENT) Crea Apply topically.       ergocalciferol (ERGOCALCIFEROL) 50,000 unit capsule Take 50,000 Units by mouth.       ergocalciferol (VITAMIN D2) 50,000 unit capsule Take 50,000 Units by mouth once a week. Every Friday       eszopiclone (LUNESTA) tablet Take 3 mg by mouth bedtime as needed for sleep. Take immediately before bedtime       exenatide microspheres (BYDUREON) 2 mg/0.65 mL PnIj extended release injection Inject 2 mg under the skin.       exenatide microspheres (BYDUREON) 2 mg/0.65 mL PnIj Inject 2 mg under the skin every 7 days.       ferrous sulfate 325 (65 FE) MG tablet Take 1 tablet by mouth daily with breakfast.       ferrous sulfate 325 (65 FE) MG tablet Take 325 mg by mouth.       gabapentin (NEURONTIN) 300 MG capsule Take 300 mg by mouth.       glimepiride (AMARYL) 4 MG tablet Take 4 mg by mouth 2 (two) times a day.       glimepiride (AMARYL) 4 MG tablet Take 4 mg by mouth.       glucagon, human recombinant, 1 mg injection 1 mg.       GLUCAGON,HUMAN RECOMBINANT (GLUCAGON EMERGENCY KIT, HUMAN, INJ) Inject 1 mg as directed as needed (Low blood sugar).       GUAIFENESIN (DIABETIC TUSSIN MUCUS RELIEF ORAL) Take 200 mg by mouth every 4 (four) hours as needed.        guaiFENesin (ROBITUSSIN) 100 mg/5 mL syrup Take 100 mg by mouth every 4 (four) hours as needed for cough.       hydroCHLOROthiazide (HYDRODIURIL) 50 MG tablet Take 50 mg by mouth daily.       insulin aspart (NOVOLOG) 100 unit/mL injection pen 34 units at breakfast, 11 units at lunch, and 14 units at dinner plus sliding scale: 3 mL 0     INVOKANA 300 mg Tab        ipratropium-albuterol (DUO-NEB) 0.5-2.5 mg/3 mL nebulizer Inhale 3 mL.       lamoTRIgine (LAMICTAL) 100 MG tablet Take 300 mg by mouth bedtime.       lamoTRIgine (LAMICTAL) 200  MG tablet        LANTUS SOLOSTAR 100 unit/mL (3 mL) pen Inject 12 Units under the skin every morning. 11.9 Type 2 without complications 3 mL PRN     levothyroxine (SYNTHROID, LEVOTHROID) 137 MCG tablet        levothyroxine (SYNTHROID, LEVOTHROID) 150 MCG tablet Take 150 mcg by mouth daily.       lidocaine (XYLOCAINE) 2 % jelly Apply topically as needed.       lisinopril (PRINIVIL,ZESTRIL) 2.5 MG tablet Take 2.5 mg by mouth daily. Hold if SBP is less than 110       loperamide (IMODIUM) 2 mg capsule Take 2 mg by mouth 4 (four) times a day as needed for diarrhea (Do not exceed 8 tabs daily).       metFORMIN (GLUCOPHAGE) 1000 MG tablet Take 1,000 mg by mouth 2 (two) times a day with meals.       metFORMIN (GLUCOPHAGE-XR) 500 MG 24 hr tablet        mirtazapine (REMERON) 45 MG tablet        mometasone-formoterol (DULERA) 100-5 mcg/actuation HFAA inhaler Inhale 2 puffs 2 (two) times a day.       mometasone-formoterol (DULERA) 100-5 mcg/actuation HFAA inhaler Inhale 2 puffs.       NOVOLOG FLEXPEN 100 unit/mL injection pen Inject 3 times a day with meals:   - 299=2 units   - 349=4 units   - 399=6 units   - 999=8 units 3 mL PRN     NOVOLOG U-100 INSULIN ASPART 100 unit/mL injection        olopatadine (PATANOL) 0.1 % ophthalmic solution Administer 1 drop to both eyes 2 (two) times a day.        olopatadine (PATANOL) 0.1 % ophthalmic solution 1 Drop two times a day.       omeprazole (PRILOSEC) 20 MG capsule Take 20 mg by mouth daily.       omeprazole (PRILOSEC) 20 MG capsule Take 20 mg by mouth.       oxyCODONE (ROXICODONE) 5 MG immediate release tablet        polyvinyl alcohol (TEARFAIR) 1.4 % ophthalmic solution Indications: Instill 1 drop in both eyes as needed for dry eyes 1-2 drops, QD       povidone-iodine 10 % swab        pravastatin (PRAVACHOL) 20 MG tablet        REXULTI 4 mg Tab tablet        senna-docusate (PERICOLACE) 8.6-50 mg tablet Take 2 tablets by mouth.       tiotropium (SPIRIVA) 18 mcg  inhalation capsule Place 18 mcg into inhaler and inhale daily.       tiotropium (SPIRIVA) 18 mcg inhalation capsule Place 18 mcg into inhaler and inhale.       traMADol (ULTRAM) 50 mg tablet Take 2 tablets (100 mg total) by mouth 3 (three) times a day. Not to exceed 6 tabs per day 10 tablet 0     triamcinolone (KENALOG) 0.1 % cream Apply 1 application topically. As needed for upper extremity rash       warfarin (COUMADIN) 10 MG tablet daily. 12 mg at bedtime every Mon; 9 mg at bedtime every Sun, Tues, Wed, Thu, Fri, Sat        traMADol (ULTRAM) 50 mg tablet Take  mg by mouth.       No current facility-administered medications for this visit.        Allergies   Allergen Reactions     Erythromycin Anaphylaxis     Hydroxyzine      [place] Mushroom Hives     mushrooms     Mushroom      Penicillins Hives       Physical Exam:    There were no vitals filed for this visit. There is no height or weight on file to calculate BMI.    General:  62 y.o. female in no apparent distress.    Head:  Normocephalic atraumatic  Psychiatric: Cooperative.   Respiratory: unlabored breathing.    Integumentary:      Back Ulceration(s):   Wound bed: Prior to debridement: Unable to visualize.  Tunneling yes   Wound Edge:attached   Periwound:  There is no teresa wound erythema, warmth  induration, maceration, denudement or fluctuance surrounding the ulcer(s).  Exudate: copious   serous drainage with no odor.  Wound Shape regular    Wound 06/07/18 Low Back  (Active)   Pre Size Length 0.7 6/21/2018  3:00 PM   Pre Size Width 0.3 6/21/2018  3:00 PM   Pre Size Depth 2.7 6/21/2018  3:00 PM   Pre Total Sq cm 0.21 6/21/2018  3:00 PM   Post Size Depth 9 6/21/2018  3:00 PM     Assessment:    Images:  none pertinent    Labs:    The following labs were reviewed by me today.    Lab Results   Component Value Date    WBC 10.0 05/24/2018    HGB 8.1 (L) 04/26/2018    HCT 26.9 (L) 04/26/2018     04/26/2018     04/26/2018               Invalid  input(s): EOSPC    Lab Results   Component Value Date    CREATININE 0.84 02/28/2017         Lab Results   Component Value Date    BUN 5 (L) 02/28/2017     No results found for: PREALBUMINESUFAST(LABPROT,LABALBU,albumin)@  Invalid input(s): LABALBU  No results found for: PREALBUMIN    Lab Results   Component Value Date    CRP 4.1 (H) 05/24/2018     Lab Results   Component Value Date    SEDRATE 112 (H) 05/24/2018       Lab Results   Component Value Date    HGBA1C 8.9 (H) 03/03/2016     Lab Results   Component Value Date    TSH 3.02 02/27/2017           Vitamin D, Total (25-Hydroxy)   Date Value Ref Range Status   01/08/2015 33.6 30.0 - 80.0 ng/mL Final     No results found for: BNP     1. Local infection of wound  clindamycin (CLEOCIN) 300 MG capsule    Culture/Gram Stain: Wound    Culture, Anaerobic   2. Bipolar 1 disorder (H)     3. Diabetes type 2, uncontrolled (H)     4. Lumbar radiculopathy, chronic     5. Obesity (BMI 30-39.9)     6. Personality disorder     7. Anemia, unspecified type     8. Other idiopathic peripheral autonomic neuropathy     9. Major depressive disorder         A new wound was identified today: no.    Plan:  1.  No debridement    2.  Sacral ulcer secondary to surgery. Signs of infection include increase in wound size, drainage and pain.  Using the Miller Technique, I obtained an aerobic and anaerobic culture and sensitivity today. Using the Culturette, I was able to probe 9 cm.  I suspect that it tracts to the left slightly, making the true depth easily mis  I also prescribed Clindamycin. She may benefit from a referral to infectious disease. Will forward information to [facility].    3.  Diabetes, A1c: 6.0 and Diabetic management by Endocrinologist     4.  Anemia, on iron supplement.    5.  Treatment:   Will continue to pack with silversorb and nugauze to decrease the bacterial load.  It will be covered with Kerra Kalia and changed daily.    6.    Patient will follow up with me in one week  for reevaluation.          Mamie Munroe, APRN, CNP,  [facility]  [phone]

## 2021-06-19 NOTE — PROGRESS NOTES
Date of Service:6/28/2018    Provider's initial consult visit:  6/7/2018    Chief Complaint:   Chief Complaint   Patient presents with     Wound Check       History:   Patient presents to clinic in regards to her back ulcer. She had back surgery on April 17 at [facility] for a anterior/posterior fusion L3-S1 with pelvic fixation.  She was started on clindamycin by ROMELIA Rice on 5/14/2018 for an incisional infection, which ended on 6/4/2018.  The  patient was last seen by me on 6/21/2018 when the wound was cultured and she was started on Clindamycin because the wound was significantly deeper.  On 6/25/2018, her antibiotic was changed to Doxycycline as a result of the culture.  She states that her drainage and pain is unchanged.    Current Outpatient Prescriptions   Medication Sig Dispense Refill     artificial tears,hypromellose, (GENTEAL) 0.3 % Drop Administer 1 drop to both eyes as needed.        blood glucose test (ACCU-CHEK ACTIVE TEST) strips Use 1 each As Directed 2 (two) times a day. Dispense brand per patient's insurance at pharmacy discretion.       brexpiprazole (REXULTI) 3 mg Tab tablet Take 3 mg by mouth daily.       calcium carbonate (CALCIUM 600) 600 mg calcium (1,500 mg) tablet 500 mg.       cetirizine (ZYRTEC) 10 MG tablet Take 10 mg by mouth daily.       citalopram (CELEXA) 40 MG tablet Take 40 mg by mouth.       cyclobenzaprine (FLEXERIL) 10 MG tablet Take 10 mg by mouth 3 (three) times a day as needed for muscle spasms.       dextrose (GLUTOSE) 40 % gel Take 15 g by mouth as needed (Low blood sugar).       diazePAM (VALIUM) 5 MG tablet        diphenhydrAMINE (BENADRYL) 25 mg capsule Tab 1 at bedtime as needed for sleep       doxycycline (VIBRA-TABS) 100 MG tablet Take 1 tablet (100 mg total) by mouth 2 (two) times a day for 10 days. 20 tablet 0     dulaglutide (TRULICITY) 1.5 mg/0.5 mL PnIj Inject 1.5 mg under the skin every 7 days.       emollient (EMOLLIENT) Crea Apply 1 application  topically as needed.       ergocalciferol (ERGOCALCIFEROL) 50,000 unit capsule Take 50,000 Units by mouth.       eszopiclone (LUNESTA) tablet Take 3 mg by mouth bedtime as needed for sleep. Take immediately before bedtime       exenatide microspheres (BYDUREON) 2 mg/0.65 mL PnIj Inject 2 mg under the skin every 7 days.       ferrous sulfate 325 (65 FE) MG tablet Take 1 tablet by mouth daily with breakfast.       gabapentin (NEURONTIN) 300 MG capsule Take 300 mg by mouth.       glimepiride (AMARYL) 4 MG tablet Take 4 mg by mouth 2 (two) times a day.       glucagon, human recombinant, 1 mg injection 1 mg.       GUAIFENESIN (DIABETIC TUSSIN MUCUS RELIEF ORAL) Take 200 mg by mouth every 4 (four) hours as needed.        hydroCHLOROthiazide (HYDRODIURIL) 50 MG tablet Take 50 mg by mouth daily.       insulin aspart (NOVOLOG) 100 unit/mL injection pen 34 units at breakfast, 11 units at lunch, and 14 units at dinner plus sliding scale: 3 mL 0     INVOKANA 300 mg Tab        ipratropium-albuterol (DUO-NEB) 0.5-2.5 mg/3 mL nebulizer Inhale 3 mL.       lamoTRIgine (LAMICTAL) 100 MG tablet Take 300 mg by mouth bedtime.       lamoTRIgine (LAMICTAL) 200 MG tablet        LANTUS SOLOSTAR 100 unit/mL (3 mL) pen Inject 12 Units under the skin every morning. 11.9 Type 2 without complications 3 mL PRN     levothyroxine (SYNTHROID, LEVOTHROID) 137 MCG tablet        lidocaine (XYLOCAINE) 2 % jelly Apply topically as needed.       lisinopril (PRINIVIL,ZESTRIL) 2.5 MG tablet Take 2.5 mg by mouth daily. Hold if SBP is less than 110       loperamide (IMODIUM) 2 mg capsule Take 2 mg by mouth 4 (four) times a day as needed for diarrhea (Do not exceed 8 tabs daily).       metFORMIN (GLUCOPHAGE-XR) 500 MG 24 hr tablet        mirtazapine (REMERON) 45 MG tablet        mometasone-formoterol (DULERA) 100-5 mcg/actuation HFAA inhaler Inhale 2 puffs.       NOVOLOG U-100 INSULIN ASPART 100 unit/mL injection        olopatadine (PATANOL) 0.1 % ophthalmic  solution Administer 1 drop to both eyes 2 (two) times a day.        omeprazole (PRILOSEC) 20 MG capsule Take 20 mg by mouth daily.       oxyCODONE (ROXICODONE) 5 MG immediate release tablet        polyvinyl alcohol (TEARFAIR) 1.4 % ophthalmic solution Indications: Instill 1 drop in both eyes as needed for dry eyes 1-2 drops, QD       povidone-iodine 10 % swab        pravastatin (PRAVACHOL) 20 MG tablet        REXULTI 4 mg Tab tablet        senna-docusate (PERICOLACE) 8.6-50 mg tablet Take 2 tablets by mouth.       tiotropium (SPIRIVA) 18 mcg inhalation capsule Place 18 mcg into inhaler and inhale.       traMADol (ULTRAM) 50 mg tablet Take  mg by mouth.       triamcinolone (KENALOG) 0.1 % cream Apply 1 application topically. As needed for upper extremity rash       warfarin (COUMADIN) 10 MG tablet daily. 12 mg at bedtime every Mon; 9 mg at bedtime every Sun, Tues, Wed, Thu, Fri, Sat        No current facility-administered medications for this visit.        Allergies   Allergen Reactions     Erythromycin Anaphylaxis     Hydroxyzine      [place] Mushroom Hives     mushrooms     Mushroom      Penicillins Hives       Physical Exam:    Vitals:    06/28/18 1425   BP: 118/64   Pulse: 62   Resp: 16   Temp: 98.2  F (36.8  C)    There is no height or weight on file to calculate BMI.    General:  62 y.o. female in no apparent distress.    Head:  Normocephalic atraumatic  Psychiatric: Cooperative.   Respiratory: unlabored breathing.    Integumentary:      Back Ulceration(s):   Wound bed: Prior to debridement: Unable to visualize.  Tunneling yes   Wound Edge:attached   Periwound:  There is no teresa wound erythema, warmth  induration, maceration, denudement or fluctuance surrounding the ulcer(s).  Exudate: copious   serous drainage with no odor.  Wound Shape regular    Wound 06/07/18 Low Back  (Active)   Pre Size Length 0.7 6/28/2018  2:00 PM   Pre Size Width 0.3 6/28/2018  2:00 PM   Pre Size Depth 2.5 6/28/2018  2:00 PM    Pre Total Sq cm 0.21 6/28/2018  2:00 PM   Post Size Length 0.7 6/28/2018  2:00 PM   Post Size Width 0.3 6/28/2018  2:00 PM   Post Size Depth 4.5 6/28/2018  2:00 PM     Assessment:    Images:  none pertinent    Labs:    The following labs were reviewed by me today.    Lab Results   Component Value Date    WBC 10.0 05/24/2018    HGB 8.1 (L) 04/26/2018    HCT 26.9 (L) 04/26/2018     04/26/2018     04/26/2018               Invalid input(s): EOSPC    Lab Results   Component Value Date    CREATININE 0.84 02/28/2017         Lab Results   Component Value Date    BUN 5 (L) 02/28/2017     No results found for: PREALBUMINESUFAST(LABPROT,LABALBU,albumin)@  Invalid input(s): LABALBU  No results found for: PREALBUMIN    Lab Results   Component Value Date    CRP 4.1 (H) 05/24/2018     Lab Results   Component Value Date    SEDRATE 112 (H) 05/24/2018       Lab Results   Component Value Date    HGBA1C 8.9 (H) 03/03/2016     Lab Results   Component Value Date    TSH 3.02 02/27/2017           Vitamin D, Total (25-Hydroxy)   Date Value Ref Range Status   01/08/2015 33.6 30.0 - 80.0 ng/mL Final     No results found for: BNP     1. Skin ulcer of sacrum, unspecified ulcer stage (H)     2. Diabetes (H)     3. Bipolar 1 disorder (H)     4. Local infection of wound     5. Obesity (BMI 30-39.9)     6. Personality disorder     7. Anemia, unspecified type     8. Wound disruption, subsequent encounter         A new wound was identified today: no.    Plan:  1.  No debridement    2.  Sacral ulcer secondary to surgery. Wound size has decreased, but the pain and drainage has not. She has a follow up with her spine surgeon on July 2.    3.  Diabetes, A1c: 6.0 and Diabetic management by Endocrinologist     4.  Anemia, on iron supplement.    5.  Treatment:   Will continue to pack with silversorb and nugauze to decrease the bacterial load.  It will be covered with Kerra Kalia and changed daily. At her next visit, I will consider  discontinuing silversorb.    6.   Patient will follow up with me in two weeks  for reevaluation.          Mamie Munroe, APRN, CNP,  [facility]  [phone]

## 2021-06-19 NOTE — PROGRESS NOTES
Date of Service:7/10/2018    Provider's initial consult visit:  6/7/2018    Chief Complaint:   Chief Complaint   Patient presents with     Wound Check       History:   Patient presents to clinic in regards to her back ulcer. She had back surgery on April 17 at [facility] for a anterior/posterior fusion L3-S1 with pelvic fixation.  She was started on clindamycin by ROMELIA Rice on 5/14/2018 for an incisional infection, which ended on 6/4/2018.  The  patient was last seen by me on 6/28/ 2018 when the wound was packed with silvercel.  She has finished taking her antibiotic and her pain is unchanged.  Her drainage is a lot less.  She has a follow up with her surgeon tomorrow.    Current Outpatient Prescriptions   Medication Sig Dispense Refill     artificial tears,hypromellose, (GENTEAL) 0.3 % Drop Administer 1 drop to both eyes as needed.        blood glucose test (ACCU-CHEK ACTIVE TEST) strips Use 1 each As Directed 2 (two) times a day. Dispense brand per patient's insurance at pharmacy discretion.       brexpiprazole (REXULTI) 3 mg Tab tablet Take 3 mg by mouth daily.       calcium carbonate (CALCIUM 600) 600 mg calcium (1,500 mg) tablet 500 mg.       cetirizine (ZYRTEC) 10 MG tablet Take 10 mg by mouth daily.       citalopram (CELEXA) 40 MG tablet Take 40 mg by mouth.       cyclobenzaprine (FLEXERIL) 10 MG tablet Take 10 mg by mouth 3 (three) times a day as needed for muscle spasms.       dextrose (GLUTOSE) 40 % gel Take 15 g by mouth as needed (Low blood sugar).       diazePAM (VALIUM) 5 MG tablet        diphenhydrAMINE (BENADRYL) 25 mg capsule Tab 1 at bedtime as needed for sleep       dulaglutide (TRULICITY) 1.5 mg/0.5 mL PnIj Inject 1.5 mg under the skin every 7 days.       DULoxetine (CYMBALTA) 20 MG capsule        emollient (EMOLLIENT) Crea Apply 1 application topically as needed.       ergocalciferol (ERGOCALCIFEROL) 50,000 unit capsule Take 50,000 Units by mouth.       eszopiclone (LUNESTA) tablet  Take 3 mg by mouth bedtime as needed for sleep. Take immediately before bedtime       exenatide microspheres (BYDUREON) 2 mg/0.65 mL PnIj Inject 2 mg under the skin every 7 days.       ferrous sulfate 325 (65 FE) MG tablet Take 1 tablet by mouth daily with breakfast.       gabapentin (NEURONTIN) 300 MG capsule Take 300 mg by mouth.       glimepiride (AMARYL) 4 MG tablet Take 4 mg by mouth 2 (two) times a day.       glucagon, human recombinant, 1 mg injection 1 mg.       GUAIFENESIN (DIABETIC TUSSIN MUCUS RELIEF ORAL) Take 200 mg by mouth every 4 (four) hours as needed.        hydroCHLOROthiazide (HYDRODIURIL) 50 MG tablet Take 50 mg by mouth daily.       insulin aspart (NOVOLOG) 100 unit/mL injection pen 34 units at breakfast, 11 units at lunch, and 14 units at dinner plus sliding scale: 3 mL 0     INVOKANA 300 mg Tab        ipratropium-albuterol (DUO-NEB) 0.5-2.5 mg/3 mL nebulizer Inhale 3 mL.       lamoTRIgine (LAMICTAL) 100 MG tablet Take 300 mg by mouth bedtime.       lamoTRIgine (LAMICTAL) 200 MG tablet        LANTUS SOLOSTAR 100 unit/mL (3 mL) pen Inject 12 Units under the skin every morning. 11.9 Type 2 without complications 3 mL PRN     levothyroxine (SYNTHROID, LEVOTHROID) 137 MCG tablet        lidocaine (XYLOCAINE) 2 % jelly Apply topically as needed.       lisinopril (PRINIVIL,ZESTRIL) 2.5 MG tablet Take 2.5 mg by mouth daily. Hold if SBP is less than 110       loperamide (IMODIUM) 2 mg capsule Take 2 mg by mouth 4 (four) times a day as needed for diarrhea (Do not exceed 8 tabs daily).       metFORMIN (GLUCOPHAGE-XR) 500 MG 24 hr tablet        mirtazapine (REMERON) 45 MG tablet        mometasone-formoterol (DULERA) 100-5 mcg/actuation HFAA inhaler Inhale 2 puffs.       NOVOLOG U-100 INSULIN ASPART 100 unit/mL injection        olopatadine (PATANOL) 0.1 % ophthalmic solution Administer 1 drop to both eyes 2 (two) times a day.        omeprazole (PRILOSEC) 20 MG capsule Take 20 mg by mouth daily.        oxyCODONE (ROXICODONE) 5 MG immediate release tablet        polyvinyl alcohol (TEARFAIR) 1.4 % ophthalmic solution Indications: Instill 1 drop in both eyes as needed for dry eyes 1-2 drops, QD       povidone-iodine 10 % swab        pravastatin (PRAVACHOL) 20 MG tablet        REXULTI 4 mg Tab tablet        senna-docusate (PERICOLACE) 8.6-50 mg tablet Take 2 tablets by mouth.       tiotropium (SPIRIVA) 18 mcg inhalation capsule Place 18 mcg into inhaler and inhale.       traMADol (ULTRAM) 50 mg tablet Take  mg by mouth.       triamcinolone (KENALOG) 0.1 % cream Apply 1 application topically. As needed for upper extremity rash       warfarin (COUMADIN) 10 MG tablet daily. 12 mg at bedtime every Mon; 9 mg at bedtime every Sun, Tues, Wed, Thu, Fri, Sat        No current facility-administered medications for this visit.        Allergies   Allergen Reactions     Erythromycin Anaphylaxis     Hydroxyzine      [place] Mushroom Hives     mushrooms     Mushroom Hives     Penicillins Hives       Physical Exam:    Vitals:    07/10/18 0808   BP: 112/62   Pulse: 64   Resp: 16   Temp: 98.3  F (36.8  C)    Body mass index is 35.53 kg/(m^2).    General:  62 y.o. female in no apparent distress.    Head:  Normocephalic atraumatic  Psychiatric: Cooperative.   Respiratory: unlabored breathing.    Integumentary:      Back Ulceration(s):   Wound bed: Prior to debridement: Unable to visualize.  Tunneling yes   Wound Edge:attached   Periwound:  There is no teresa wound erythema, warmth  induration, maceration, denudement or fluctuance surrounding the ulcer(s).  Exudate: copious   serous drainage with no odor.  Wound Shape regular    Wound 06/07/18 Low Back  (Active)   Pre Size Length 0.5 7/10/2018  8:00 AM   Pre Size Width 0.2 7/10/2018  8:00 AM   Pre Size Depth 1 7/10/2018  8:00 AM   Pre Total Sq cm 0.1 7/10/2018  8:00 AM   Post Size Length 0.3 7/10/2018  8:00 AM   Post Size Width 0.3 7/10/2018  8:00 AM   Post Size Depth 3.5 7/10/2018   8:00 AM     Assessment:    Images:  none pertinent    Labs:    The following labs were reviewed by me today.    Lab Results   Component Value Date    WBC 9.2 07/09/2018    HGB 8.1 (L) 04/26/2018    HCT 26.9 (L) 04/26/2018     04/26/2018     04/26/2018           Results from last 7 days  Lab Units 07/09/18  0805   LN-WHITE BLOOD CELL COUNT thou/uL 9.2       Lab Results   Component Value Date    CREATININE 0.84 02/28/2017         Lab Results   Component Value Date    BUN 5 (L) 02/28/2017     No results found for: PREALBUMINESUFAST(LABPROT,LABALBU,albumin)@  Invalid input(s): LABALBU  No results found for: PREALBUMIN    Lab Results   Component Value Date    CRP 2.4 (H) 07/09/2018     Lab Results   Component Value Date    SEDRATE 94 (H) 07/09/2018       Lab Results   Component Value Date    HGBA1C 8.9 (H) 03/03/2016     Lab Results   Component Value Date    TSH 3.02 02/27/2017           Vitamin D, Total (25-Hydroxy)   Date Value Ref Range Status   01/08/2015 33.6 30.0 - 80.0 ng/mL Final     No results found for: BNP     1. Wound disruption, subsequent encounter     2. Diabetes (H)     3. Bipolar 1 disorder (H)     4. Obesity (BMI 30-39.9)     5. Personality disorder     6. Anemia, unspecified type         A new wound was identified today: no.    Plan:  1.  No debridement    2.  Sacral ulcer secondary to surgery. Wound size drainage has decreased.    3.  Diabetes, A1c: 6.0 and Diabetic management by Endocrinologist     4.  Anemia, on iron supplement.    5.  Treatment:   Will discontinue to packing with silversorb.  Will pack ulcer with puracol and plug the opening with nugauze to prevent premature closure. Dressing will be changed every three days.    6.   Patient will follow up with me in three weeks  for reevaluation.          Mamie Munroe, APRN, CNP,  [facility]  [phone]

## 2021-06-19 NOTE — PROGRESS NOTES
Date of Service:8/2/2018    Provider's initial consult visit:  6/7/2018    Chief Complaint:   Chief Complaint   Patient presents with     Wound Check       History:   Patient presents to clinic in regards to her back ulcer. She had back surgery on April 17 at [facility] for a anterior/posterior fusion L3-S1 with pelvic fixation.  She was started on clindamycin by ROMELIA Rice on 5/14/2018 for an incisional infection, which ended on 6/4/2018.  The  patient was last seen by me on 7/10/ 2018 when silvercel was discontinued and puracol started.  It is being changed every three days.  Her drainage is a lot less.      Current Outpatient Prescriptions   Medication Sig Dispense Refill     artificial tears,hypromellose, (GENTEAL) 0.3 % Drop Administer 1 drop to both eyes as needed.        blood glucose test (ACCU-CHEK ACTIVE TEST) strips Use 1 each As Directed 2 (two) times a day. Dispense brand per patient's insurance at pharmacy discretion.       brexpiprazole (REXULTI) 3 mg Tab tablet Take 3 mg by mouth daily.       calcium carbonate (CALCIUM 600) 600 mg calcium (1,500 mg) tablet 500 mg.       cetirizine (ZYRTEC) 10 MG tablet Take 10 mg by mouth daily.       citalopram (CELEXA) 40 MG tablet Take 40 mg by mouth.       cyclobenzaprine (FLEXERIL) 10 MG tablet Take 10 mg by mouth 3 (three) times a day as needed for muscle spasms.       dextrose (GLUTOSE) 40 % gel Take 15 g by mouth as needed (Low blood sugar).       diazePAM (VALIUM) 5 MG tablet        diphenhydrAMINE (BENADRYL) 25 mg capsule Tab 1 at bedtime as needed for sleep       dulaglutide (TRULICITY) 1.5 mg/0.5 mL PnIj Inject 1.5 mg under the skin every 7 days.       DULoxetine (CYMBALTA) 20 MG capsule        emollient (EMOLLIENT) Crea Apply 1 application topically as needed.       ergocalciferol (ERGOCALCIFEROL) 50,000 unit capsule Take 50,000 Units by mouth.       eszopiclone (LUNESTA) tablet Take 3 mg by mouth bedtime as needed for sleep. Take immediately  before bedtime       exenatide microspheres (BYDUREON) 2 mg/0.65 mL PnIj Inject 2 mg under the skin every 7 days.       ferrous sulfate 325 (65 FE) MG tablet Take 1 tablet by mouth daily with breakfast.       gabapentin (NEURONTIN) 300 MG capsule Take 300 mg by mouth.       glimepiride (AMARYL) 4 MG tablet Take 4 mg by mouth 2 (two) times a day.       glucagon, human recombinant, 1 mg injection 1 mg.       GUAIFENESIN (DIABETIC TUSSIN MUCUS RELIEF ORAL) Take 200 mg by mouth every 4 (four) hours as needed.        hydroCHLOROthiazide (HYDRODIURIL) 50 MG tablet Take 50 mg by mouth daily.       insulin aspart (NOVOLOG) 100 unit/mL injection pen 34 units at breakfast, 11 units at lunch, and 14 units at dinner plus sliding scale: 3 mL 0     INVOKANA 300 mg Tab        ipratropium-albuterol (DUO-NEB) 0.5-2.5 mg/3 mL nebulizer Inhale 3 mL.       lamoTRIgine (LAMICTAL) 100 MG tablet Take 300 mg by mouth bedtime.       lamoTRIgine (LAMICTAL) 200 MG tablet        LANTUS SOLOSTAR 100 unit/mL (3 mL) pen Inject 12 Units under the skin every morning. 11.9 Type 2 without complications 3 mL PRN     levothyroxine (SYNTHROID, LEVOTHROID) 137 MCG tablet        lidocaine (XYLOCAINE) 2 % jelly Apply topically as needed.       lisinopril (PRINIVIL,ZESTRIL) 2.5 MG tablet Take 2.5 mg by mouth daily. Hold if SBP is less than 110       loperamide (IMODIUM) 2 mg capsule Take 2 mg by mouth 4 (four) times a day as needed for diarrhea (Do not exceed 8 tabs daily).       metFORMIN (GLUCOPHAGE-XR) 500 MG 24 hr tablet        mirtazapine (REMERON) 45 MG tablet        mometasone-formoterol (DULERA) 100-5 mcg/actuation HFAA inhaler Inhale 2 puffs.       NOVOLOG U-100 INSULIN ASPART 100 unit/mL injection        olopatadine (PATANOL) 0.1 % ophthalmic solution Administer 1 drop to both eyes 2 (two) times a day.        omeprazole (PRILOSEC) 20 MG capsule Take 20 mg by mouth daily.       oxyCODONE (ROXICODONE) 5 MG immediate release tablet        polyvinyl  alcohol (TEARFAIR) 1.4 % ophthalmic solution Indications: Instill 1 drop in both eyes as needed for dry eyes 1-2 drops, QD       povidone-iodine 10 % swab        pravastatin (PRAVACHOL) 20 MG tablet        REXULTI 4 mg Tab tablet        senna-docusate (PERICOLACE) 8.6-50 mg tablet Take 2 tablets by mouth.       tiotropium (SPIRIVA) 18 mcg inhalation capsule Place 18 mcg into inhaler and inhale.       traMADol (ULTRAM) 50 mg tablet Take  mg by mouth.       triamcinolone (KENALOG) 0.1 % cream Apply 1 application topically. As needed for upper extremity rash       warfarin (COUMADIN) 10 MG tablet daily. 12 mg at bedtime every Mon; 9 mg at bedtime every Sun, Tues, Wed, Thu, Fri, Sat        No current facility-administered medications for this visit.        Allergies   Allergen Reactions     Erythromycin Anaphylaxis     Hydroxyzine      [place] Mushroom Hives     mushrooms     Mushroom Hives     Penicillins Hives       Physical Exam:    Vitals:    08/02/18 1351   BP: 118/72   Pulse: 66   Temp: 98.5  F (36.9  C)    There is no height or weight on file to calculate BMI.    General:  62 y.o. female in no apparent distress.    Head:  Normocephalic atraumatic  Psychiatric: Cooperative.   Respiratory: unlabored breathing.    Integumentary:      Back Ulceration(s):   Wound bed: Prior to debridement: hypergranulation tissue  Tunneling no   Wound Edge: unattached   Periwound:  There is no teresa wound erythema, warmth  induration, maceration, denudement or fluctuance surrounding the ulcer(s).  Exudate: minimal  serous drainage with no odor.  Wound Shape regular    Wound 06/07/18 Low Back  (Active)   Pre Size Length 1.1 8/2/2018  1:56 PM   Pre Size Width 0.5 8/2/2018  1:56 PM   Pre Size Depth 1 7/10/2018  8:00 AM   Pre Total Sq cm 0.55 8/2/2018  1:56 PM   Post Size Length 0.3 7/10/2018  8:00 AM   Post Size Width 0.3 7/10/2018  8:00 AM   Post Size Depth 3.5 7/10/2018  8:00 AM     Assessment:    Images:  none  pertinent    Labs:    The following labs were reviewed by me today.    Lab Results   Component Value Date    WBC 9.2 07/18/2018    HGB 8.1 (L) 04/26/2018    HCT 26.9 (L) 04/26/2018     04/26/2018     04/26/2018               Invalid input(s): EOSPC    Lab Results   Component Value Date    CREATININE 0.84 02/28/2017         Lab Results   Component Value Date    BUN 5 (L) 02/28/2017     No results found for: PREALBUMINESUFAST(LABPROT,LABALBU,albumin)@  Invalid input(s): LABALBU  No results found for: PREALBUMIN    Lab Results   Component Value Date    CRP 2.2 (H) 07/18/2018     Lab Results   Component Value Date    SEDRATE 99 (H) 07/18/2018       Lab Results   Component Value Date    HGBA1C 8.9 (H) 03/03/2016     Lab Results   Component Value Date    TSH 3.02 02/27/2017           Vitamin D, Total (25-Hydroxy)   Date Value Ref Range Status   01/08/2015 33.6 30.0 - 80.0 ng/mL Final     No results found for: BNP     1. Wound disruption, subsequent encounter     2. Bipolar 1 disorder (H)     3. Obesity (BMI 30-39.9)     4. Anemia, unspecified type     5. Diabetes (H)         A new wound was identified today: no.    Plan:  1.  No debridement.  However, the hypergranulation tissue was silver nitrated.    2.  Sacral ulcer secondary to surgery. Wound size and drainage has decreased.    3.  Diabetes, A1c: 6.0 and Diabetic management by Endocrinologist     4.  Anemia, on iron supplement.    5.  Treatment:   Will discontinue puracol, but continue cover with a foam adhesive.      6.   Patient will follow up with me in three weeks  for reevaluation.          Mamie Munroe, APRN, CNP,  [facility]  [phone]

## 2021-06-20 NOTE — PROGRESS NOTES
Date of Service:9/13/2018    Provider's initial consult visit:  6/7/2018    Chief Complaint:   Chief Complaint   Patient presents with     Wound Check       History:   Patient presents to clinic in regards to her back ulcer. She had back surgery on April 17 at [facility] for a anterior/posterior fusion L3-S1 with pelvic fixation. The  patient was last seen by me on 8/23/2018 when puracol was discontinued and Tegaderm AG M Mesh started and it was cultured.  She just started taking ciprofloxacin and is scheduled for a MRI that was ordered by her surgeon.  The drainage is unchanged.    Current Outpatient Prescriptions   Medication Sig Dispense Refill     acetaminophen (TYLENOL) 325 MG tablet Take 650 mg by mouth every 6 (six) hours as needed for pain.       artificial tears,hypromellose, (GENTEAL) 0.3 % Drop Administer 1 drop to both eyes as needed.        aspirin 325 MG EC tablet Take 325 mg by mouth daily.       blood glucose test (ACCU-CHEK ACTIVE TEST) strips Use 1 each As Directed 2 (two) times a day. Dispense brand per patient's insurance at pharmacy discretion.       brexpiprazole (REXULTI) 3 mg Tab tablet Take 3 mg by mouth daily.       calcium carbonate (CALCIUM 600) 600 mg calcium (1,500 mg) tablet 500 mg.       cetirizine (ZYRTEC) 10 MG tablet Take 10 mg by mouth daily.       citalopram (CELEXA) 40 MG tablet Take 40 mg by mouth.       cyclobenzaprine (FLEXERIL) 10 MG tablet Take 10 mg by mouth 3 (three) times a day as needed for muscle spasms.       dextrose (GLUTOSE) 40 % gel Take 15 g by mouth as needed (Low blood sugar).       diazePAM (VALIUM) 5 MG tablet        diphenhydrAMINE (BENADRYL) 25 mg capsule Tab 1 at bedtime as needed for sleep       doxycycline (VIBRA-TABS) 100 MG tablet        dulaglutide (TRULICITY) 1.5 mg/0.5 mL PnIj Inject 1.5 mg under the skin every 7 days.       DULoxetine (CYMBALTA) 20 MG capsule        emollient (EMOLLIENT) Crea Apply 1 application topically as needed.        ergocalciferol (ERGOCALCIFEROL) 50,000 unit capsule Take 50,000 Units by mouth.       eszopiclone (LUNESTA) tablet Take 3 mg by mouth bedtime as needed for sleep. Take immediately before bedtime       exenatide microspheres (BYDUREON) 2 mg/0.65 mL PnIj Inject 2 mg under the skin every 7 days.       ferrous sulfate 325 (65 FE) MG tablet Take 1 tablet by mouth daily with breakfast.       gabapentin (NEURONTIN) 300 MG capsule Take 300 mg by mouth.       glimepiride (AMARYL) 4 MG tablet Take 4 mg by mouth 2 (two) times a day.       glucagon, human recombinant, 1 mg injection 1 mg.       GUAIFENESIN (DIABETIC TUSSIN MUCUS RELIEF ORAL) Take 200 mg by mouth every 4 (four) hours as needed.        hydroCHLOROthiazide (HYDRODIURIL) 50 MG tablet Take 50 mg by mouth daily.       insulin aspart (NOVOLOG) 100 unit/mL injection pen 34 units at breakfast, 11 units at lunch, and 14 units at dinner plus sliding scale: 3 mL 0     INVOKANA 300 mg Tab        ipratropium-albuterol (DUO-NEB) 0.5-2.5 mg/3 mL nebulizer Inhale 3 mL.       lamoTRIgine (LAMICTAL) 100 MG tablet Take 300 mg by mouth bedtime.       lamoTRIgine (LAMICTAL) 200 MG tablet        LANTUS SOLOSTAR 100 unit/mL (3 mL) pen Inject 12 Units under the skin every morning. 11.9 Type 2 without complications 3 mL PRN     levothyroxine (SYNTHROID, LEVOTHROID) 137 MCG tablet        lidocaine (LIDODERM) 5 % Place 1 patch on the skin daily. Remove & Discard patch within 12 hours or as directed by MD       lidocaine (XYLOCAINE) 2 % jelly Apply topically as needed.       lisinopril (PRINIVIL,ZESTRIL) 2.5 MG tablet Take 2.5 mg by mouth daily. Hold if SBP is less than 110       loperamide (IMODIUM) 2 mg capsule Take 2 mg by mouth 4 (four) times a day as needed for diarrhea (Do not exceed 8 tabs daily).       metFORMIN (GLUCOPHAGE-XR) 500 MG 24 hr tablet        mirtazapine (REMERON) 45 MG tablet        mometasone-formoterol (DULERA) 100-5 mcg/actuation HFAA inhaler Inhale 2 puffs.        NOVOLOG U-100 INSULIN ASPART 100 unit/mL injection        olopatadine (PATANOL) 0.1 % ophthalmic solution Administer 1 drop to both eyes 2 (two) times a day.        omeprazole (PRILOSEC) 20 MG capsule Take 20 mg by mouth daily.       oxyCODONE (ROXICODONE) 5 MG immediate release tablet        polyvinyl alcohol (TEARFAIR) 1.4 % ophthalmic solution Indications: Instill 1 drop in both eyes as needed for dry eyes 1-2 drops, QD       povidone-iodine 10 % swab        pravastatin (PRAVACHOL) 20 MG tablet        REXULTI 4 mg Tab tablet        senna-docusate (PERICOLACE) 8.6-50 mg tablet Take 2 tablets by mouth.       tiotropium (SPIRIVA) 18 mcg inhalation capsule Place 18 mcg into inhaler and inhale.       traMADol (ULTRAM) 50 mg tablet Take  mg by mouth.       triamcinolone (KENALOG) 0.1 % cream Apply 1 application topically. As needed for upper extremity rash       warfarin (COUMADIN) 10 MG tablet daily. 12 mg at bedtime every Mon; 9 mg at bedtime every Sun, Tues, Wed, Thu, Fri, Sat        No current facility-administered medications for this visit.        Allergies   Allergen Reactions     Erythromycin Anaphylaxis     Hydroxyzine      [place] Mushroom Hives     mushrooms     Mushroom Hives     Penicillins Hives     Lamotrigine Rash     Other reaction(s): rash       Physical Exam:    Vitals:    09/13/18 1314   BP: 122/68   Pulse: 82   Temp: 98.2  F (36.8  C)    Body mass index is 32.61 kg/(m^2).    General:  63 y.o. female in no apparent distress.    Head:  Normocephalic atraumatic  Psychiatric: Cooperative.   Respiratory: unlabored breathing.    Integumentary:      Back Ulceration(s):   Wound bed: Prior to debridement: 1oo% granulation tissue.  Tunneling no   Wound Edge: attached   Periwound:  There is no teresa wound erythema, warmth  induration, maceration, denudement or fluctuance surrounding the ulcer(s).  Exudate: moderate  serous drainage with no odor.  Wound Shape regular    Wound 06/07/18 Low Back  (Active)    Pre Size Length 0.2 9/13/2018  1:00 PM   Pre Size Width 0.2 9/13/2018  1:00 PM   Pre Size Depth 0.7 9/13/2018  1:00 PM   Pre Total Sq cm 0.04 9/13/2018  1:00 PM   Post Size Length 0.5 9/13/2018  1:00 PM   Post Size Width 0.2 9/13/2018  1:00 PM   Post Size Depth 1.1 9/13/2018  1:00 PM     Assessment:    Images:  none pertinent    Labs:    The following labs were reviewed by me today.    Lab Results   Component Value Date    WBC 9.7 09/10/2018    HGB 8.1 (L) 04/26/2018    HCT 26.9 (L) 04/26/2018     04/26/2018     04/26/2018             Lab Results   Component Value Date    CREATININE 0.84 02/28/2017         Lab Results   Component Value Date    BUN 5 (L) 02/28/2017     No results found for: PREALBUMINESUFAST(LABPROT,LABALBU,albumin)@  Invalid input(s): LABALBU  No results found for: PREALBUMIN    Lab Results   Component Value Date    CRP 1.2 (H) 09/10/2018     Lab Results   Component Value Date    SEDRATE 71 (H) 09/10/2018       Lab Results   Component Value Date    HGBA1C 8.9 (H) 03/03/2016     Lab Results   Component Value Date    TSH 3.02 02/27/2017           Vitamin D, Total (25-Hydroxy)   Date Value Ref Range Status   01/08/2015 33.6 30.0 - 80.0 ng/mL Final     No results found for: BNP     1. Wound disruption, subsequent encounter     2. Bipolar 1 disorder (H)     3. Obesity (BMI 30-39.9)     4. Anemia, unspecified type     5. Diabetes (H)         A new wound was identified today: no.    Plan:  1.  Debridement of the back ulcer was recommended.  After consent was obtained and topical 2% Xylocaine was applied under clean conditions, and using a #15 blade,the devitalized dermal tissue was debrided for a total square centimeters of 0.04. Following debridement, there was a decrease in the nonviable tissue. The patient tolerated the procedure without any difficulty.     2.  Sacral ulcer secondary to surgery. Wound size is deeper, but recently started her ciprofloxacin.  According to the patient, she  will be having a MRI to evaluate her wound area further.  Blood work improved from previously.     3.  Diabetes, A1c: 6.0 and Diabetic management by Endocrinologist     4.  Anemia, on iron supplement.    5.  Treatment:  Will pack the ulcer with a strip of Tegaderm AG Mesh to decrease the bacterial load until Mesalt arrives, which will be used instead.  Will cover with a Tegaderm foam adhesive and change every third day.     6.   Patient will follow up with me in three weeks  for reevaluation.          Mamie Munroe, APRN, CNP,  [facility]  [phone]

## 2021-06-20 NOTE — PROGRESS NOTES
Date of Service:8/23/2018    Provider's initial consult visit:  6/7/2018    Chief Complaint:   Chief Complaint   Patient presents with     Wound Check     3 wk f/u       History:   Patient presents to clinic in regards to her back ulcer. She had back surgery on April 17 at [facility] for a anterior/posterior fusion L3-S1 with pelvic fixation.  She was started on clindamycin by ROMELIA Rice on 5/14/2018 for an incisional infection, which ended on 6/4/2018.  The  patient was last seen by me on 8/2/2018 when puracol was discontinued and Tegaderm foam adhesive continued because the ulcer was improving.  She reports that her drainage has increased, but her pain is unchanged.  She is now able to ambulate with a walker.    Current Outpatient Prescriptions   Medication Sig Dispense Refill     acetaminophen (TYLENOL) 325 MG tablet Take 650 mg by mouth every 6 (six) hours as needed for pain.       artificial tears,hypromellose, (GENTEAL) 0.3 % Drop Administer 1 drop to both eyes as needed.        aspirin 325 MG EC tablet Take 325 mg by mouth daily.       blood glucose test (ACCU-CHEK ACTIVE TEST) strips Use 1 each As Directed 2 (two) times a day. Dispense brand per patient's insurance at pharmacy discretion.       brexpiprazole (REXULTI) 3 mg Tab tablet Take 3 mg by mouth daily.       calcium carbonate (CALCIUM 600) 600 mg calcium (1,500 mg) tablet 500 mg.       cetirizine (ZYRTEC) 10 MG tablet Take 10 mg by mouth daily.       citalopram (CELEXA) 40 MG tablet Take 40 mg by mouth.       cyclobenzaprine (FLEXERIL) 10 MG tablet Take 10 mg by mouth 3 (three) times a day as needed for muscle spasms.       dextrose (GLUTOSE) 40 % gel Take 15 g by mouth as needed (Low blood sugar).       emollient (EMOLLIENT) Crea Apply 1 application topically as needed.       ergocalciferol (ERGOCALCIFEROL) 50,000 unit capsule Take 50,000 Units by mouth.       eszopiclone (LUNESTA) tablet Take 3 mg by mouth bedtime as needed for sleep. Take  immediately before bedtime       exenatide microspheres (BYDUREON) 2 mg/0.65 mL PnIj Inject 2 mg under the skin every 7 days.       ferrous sulfate 325 (65 FE) MG tablet Take 1 tablet by mouth daily with breakfast.       gabapentin (NEURONTIN) 300 MG capsule Take 300 mg by mouth.       glimepiride (AMARYL) 4 MG tablet Take 4 mg by mouth 2 (two) times a day.       glucagon, human recombinant, 1 mg injection 1 mg.       GUAIFENESIN (DIABETIC TUSSIN MUCUS RELIEF ORAL) Take 200 mg by mouth every 4 (four) hours as needed.        hydroCHLOROthiazide (HYDRODIURIL) 50 MG tablet Take 50 mg by mouth daily.       insulin aspart (NOVOLOG) 100 unit/mL injection pen 34 units at breakfast, 11 units at lunch, and 14 units at dinner plus sliding scale: 3 mL 0     INVOKANA 300 mg Tab        ipratropium-albuterol (DUO-NEB) 0.5-2.5 mg/3 mL nebulizer Inhale 3 mL.       lamoTRIgine (LAMICTAL) 100 MG tablet Take 300 mg by mouth bedtime.       lamoTRIgine (LAMICTAL) 200 MG tablet        LANTUS SOLOSTAR 100 unit/mL (3 mL) pen Inject 12 Units under the skin every morning. 11.9 Type 2 without complications 3 mL PRN     levothyroxine (SYNTHROID, LEVOTHROID) 137 MCG tablet        lidocaine (LIDODERM) 5 % Place 1 patch on the skin daily. Remove & Discard patch within 12 hours or as directed by MD       lidocaine (XYLOCAINE) 2 % jelly Apply topically as needed.       lisinopril (PRINIVIL,ZESTRIL) 2.5 MG tablet Take 2.5 mg by mouth daily. Hold if SBP is less than 110       metFORMIN (GLUCOPHAGE-XR) 500 MG 24 hr tablet        mirtazapine (REMERON) 45 MG tablet        mometasone-formoterol (DULERA) 100-5 mcg/actuation HFAA inhaler Inhale 2 puffs.       NOVOLOG U-100 INSULIN ASPART 100 unit/mL injection        olopatadine (PATANOL) 0.1 % ophthalmic solution Administer 1 drop to both eyes 2 (two) times a day.        omeprazole (PRILOSEC) 20 MG capsule Take 20 mg by mouth daily.       oxyCODONE (ROXICODONE) 5 MG immediate release tablet         polyvinyl alcohol (TEARFAIR) 1.4 % ophthalmic solution Indications: Instill 1 drop in both eyes as needed for dry eyes 1-2 drops, QD       povidone-iodine 10 % swab        pravastatin (PRAVACHOL) 20 MG tablet        REXULTI 4 mg Tab tablet        senna-docusate (PERICOLACE) 8.6-50 mg tablet Take 2 tablets by mouth.       tiotropium (SPIRIVA) 18 mcg inhalation capsule Place 18 mcg into inhaler and inhale.       traMADol (ULTRAM) 50 mg tablet Take  mg by mouth.       triamcinolone (KENALOG) 0.1 % cream Apply 1 application topically. As needed for upper extremity rash       diazePAM (VALIUM) 5 MG tablet        diphenhydrAMINE (BENADRYL) 25 mg capsule Tab 1 at bedtime as needed for sleep       doxycycline (VIBRA-TABS) 100 MG tablet Take 1 tablet (100 mg total) by mouth 2 (two) times a day for 10 days. 20 tablet 0     dulaglutide (TRULICITY) 1.5 mg/0.5 mL PnIj Inject 1.5 mg under the skin every 7 days.       DULoxetine (CYMBALTA) 20 MG capsule        loperamide (IMODIUM) 2 mg capsule Take 2 mg by mouth 4 (four) times a day as needed for diarrhea (Do not exceed 8 tabs daily).       warfarin (COUMADIN) 10 MG tablet daily. 12 mg at bedtime every Mon; 9 mg at bedtime every Sun, Tues, Wed, Thu, Fri, Sat        No current facility-administered medications for this visit.        Allergies   Allergen Reactions     Erythromycin Anaphylaxis     Hydroxyzine      [place] Mushroom Hives     mushrooms     Mushroom Hives     Penicillins Hives     Lamotrigine Rash     Other reaction(s): rash       Physical Exam:    Vitals:    08/23/18 1341   BP: 118/70   Pulse: 80   Resp: 20   Temp: 98.5  F (36.9  C)    There is no height or weight on file to calculate BMI.    General:  62 y.o. female in no apparent distress.    Head:  Normocephalic atraumatic  Psychiatric: Cooperative.   Respiratory: unlabored breathing.    Integumentary:      Back Ulceration(s):   Wound bed: Prior to debridement: 1oo% granulation tissue.  Tunneling no   Wound  Edge: attached   Periwound:  There is no teresa wound erythema, warmth  induration, maceration, denudement or fluctuance surrounding the ulcer(s).  Exudate: moderate  serous drainage with no odor.  Wound Shape regular    Wound 06/07/18 Low Back  (Active)   Pre Size Length 1 8/23/2018  1:00 PM   Pre Size Width 0.7 8/23/2018  1:00 PM   Pre Size Depth 0.5 8/23/2018  1:00 PM   Pre Total Sq cm 0.7 8/23/2018  1:00 PM   Post Size Length 1 8/23/2018  1:00 PM   Post Size Width 0.5 8/23/2018  1:00 PM   Post Size Depth 0.6 8/23/2018  1:00 PM     Assessment:    Images:  none pertinent    Labs:    The following labs were reviewed by me today.    Lab Results   Component Value Date    WBC 9.2 07/18/2018    HGB 8.1 (L) 04/26/2018    HCT 26.9 (L) 04/26/2018     04/26/2018     04/26/2018             Lab Results   Component Value Date    CREATININE 0.84 02/28/2017         Lab Results   Component Value Date    BUN 5 (L) 02/28/2017     No results found for: PREALBUMINESUFAST(LABPROT,LABALBU,albumin)@  Invalid input(s): LABALBU  No results found for: PREALBUMIN    Lab Results   Component Value Date    CRP 2.2 (H) 07/18/2018     Lab Results   Component Value Date    SEDRATE 99 (H) 07/18/2018       Lab Results   Component Value Date    HGBA1C 8.9 (H) 03/03/2016     Lab Results   Component Value Date    TSH 3.02 02/27/2017           Vitamin D, Total (25-Hydroxy)   Date Value Ref Range Status   01/08/2015 33.6 30.0 - 80.0 ng/mL Final     No results found for: BNP     1. Wound disruption, subsequent encounter  doxycycline (VIBRA-TABS) 100 MG tablet    Culture/Gram Stain: Wound    Change dressing   2. Bipolar 1 disorder (H)     3. Obesity (BMI 30-39.9)     4. Anemia, unspecified type     5. Diabetes (H)     6. Personality disorder     7. Obesity     8. Local infection of wound  doxycycline (VIBRA-TABS) 100 MG tablet    Culture/Gram Stain: Wound       A new wound was identified today: no.    Plan:  1.  Debridement of the back ulcer  was recommended.  After consent was obtained and topical 2% Xylocaine was applied under clean conditions, and using a #15 blade,the devitalized dermal tissue was debrided for a total square centimeters of 0.7. Following debridement, there was a decrease in the nonviable tissue. The patient tolerated the procedure without any difficulty.     2.  Sacral ulcer secondary to surgery. Wound size is larger and drainage is more.  There are signs of infection.  I wrote a prescription for Doxycycline.  Using the Miller Technique, I obtained an aerobic culture and sensitivity today.    3.  Diabetes, A1c: 6.0 and Diabetic management by Endocrinologist     4.  Anemia, on iron supplement.    5.  Treatment:   Will pack the ulcer with a strip of Tegaderm AG Mesh to decrease the bacterial load.  Will cover with a Tegaderm foam adhesive and change every other day.     6.   Patient will follow up with me in three weeks  for reevaluation.          Mamie Munroe, APRN, CNP,  [facility]  [phone]

## 2021-06-20 NOTE — PROGRESS NOTES
Date of Service:10/4/2018    Provider's initial consult visit:  6/7/2018    Chief Complaint:   Chief Complaint   Patient presents with     Wound Check     3 wk f/u back ulcer tegaderm Ag Mesh mepilex ([facility])       History:   Patient presents to clinic in regards to her back ulcer. She had back surgery on April 17 at [facility] for a anterior/posterior fusion L3-S1 with pelvic fixation. The  patient was last seen by me on 9/13/2018 when  Tegaderm AG Mesh was discontinued and Mesalt started.  She has finished taking her ciprofloxacin and has had an MRI, which she has not gotten results on.  She reports that she is not having any drainage from the dressing and her pain has improved.    Current Outpatient Prescriptions   Medication Sig Dispense Refill     acetaminophen (TYLENOL) 325 MG tablet Take 650 mg by mouth every 6 (six) hours as needed for pain.       artificial tears,hypromellose, (GENTEAL) 0.3 % Drop Administer 1 drop to both eyes as needed.        aspirin 325 MG EC tablet Take 325 mg by mouth daily.       blood glucose test (ACCU-CHEK ACTIVE TEST) strips Use 1 each As Directed 2 (two) times a day. Dispense brand per patient's insurance at pharmacy discretion.       brexpiprazole (REXULTI) 3 mg Tab tablet Take 3 mg by mouth daily.       calcium carbonate (CALCIUM 600) 600 mg calcium (1,500 mg) tablet 500 mg.       cetirizine (ZYRTEC) 10 MG tablet Take 10 mg by mouth daily.       citalopram (CELEXA) 40 MG tablet Take 40 mg by mouth.       cyclobenzaprine (FLEXERIL) 10 MG tablet Take 10 mg by mouth 3 (three) times a day as needed for muscle spasms.       dextrose (GLUTOSE) 40 % gel Take 15 g by mouth as needed (Low blood sugar).       diazePAM (VALIUM) 5 MG tablet        diphenhydrAMINE (BENADRYL) 25 mg capsule Tab 1 at bedtime as needed for sleep       doxycycline (VIBRA-TABS) 100 MG tablet        dulaglutide (TRULICITY) 1.5 mg/0.5 mL PnIj Inject 1.5 mg under the skin every 7 days.        DULoxetine (CYMBALTA) 20 MG capsule        emollient (EMOLLIENT) Crea Apply 1 application topically as needed.       ergocalciferol (ERGOCALCIFEROL) 50,000 unit capsule Take 50,000 Units by mouth.       eszopiclone (LUNESTA) tablet Take 3 mg by mouth bedtime as needed for sleep. Take immediately before bedtime       exenatide microspheres (BYDUREON) 2 mg/0.65 mL PnIj Inject 2 mg under the skin every 7 days.       ferrous sulfate 325 (65 FE) MG tablet Take 1 tablet by mouth daily with breakfast.       gabapentin (NEURONTIN) 300 MG capsule Take 300 mg by mouth.       glimepiride (AMARYL) 4 MG tablet Take 4 mg by mouth 2 (two) times a day.       glucagon, human recombinant, 1 mg injection 1 mg.       GUAIFENESIN (DIABETIC TUSSIN MUCUS RELIEF ORAL) Take 200 mg by mouth every 4 (four) hours as needed.        hydroCHLOROthiazide (HYDRODIURIL) 25 MG tablet        insulin aspart (NOVOLOG) 100 unit/mL injection pen 34 units at breakfast, 11 units at lunch, and 14 units at dinner plus sliding scale: 3 mL 0     INVOKANA 300 mg Tab        ipratropium-albuterol (DUO-NEB) 0.5-2.5 mg/3 mL nebulizer Inhale 3 mL.       lamoTRIgine (LAMICTAL) 100 MG tablet Take 300 mg by mouth bedtime.       lamoTRIgine (LAMICTAL) 200 MG tablet        LANTUS SOLOSTAR 100 unit/mL (3 mL) pen Inject 12 Units under the skin every morning. 11.9 Type 2 without complications 3 mL PRN     levothyroxine (SYNTHROID, LEVOTHROID) 137 MCG tablet        lidocaine (LIDODERM) 5 % Place 1 patch on the skin daily. Remove & Discard patch within 12 hours or as directed by MD       lidocaine (XYLOCAINE) 2 % jelly Apply topically as needed.       lisinopril (PRINIVIL,ZESTRIL) 2.5 MG tablet Take 2.5 mg by mouth daily. Hold if SBP is less than 110       loperamide (IMODIUM) 2 mg capsule Take 2 mg by mouth 4 (four) times a day as needed for diarrhea (Do not exceed 8 tabs daily).       metFORMIN (GLUCOPHAGE-XR) 500 MG 24 hr tablet        mirtazapine (REMERON) 45 MG tablet         mometasone-formoterol (DULERA) 100-5 mcg/actuation HFAA inhaler Inhale 2 puffs.       NOVOLOG U-100 INSULIN ASPART 100 unit/mL injection        olopatadine (PATANOL) 0.1 % ophthalmic solution Administer 1 drop to both eyes 2 (two) times a day.        omeprazole (PRILOSEC) 20 MG capsule Take 20 mg by mouth daily.       oxyCODONE (ROXICODONE) 5 MG immediate release tablet        polyvinyl alcohol (TEARFAIR) 1.4 % ophthalmic solution Indications: Instill 1 drop in both eyes as needed for dry eyes 1-2 drops, QD       povidone-iodine 10 % swab        pravastatin (PRAVACHOL) 20 MG tablet        REXULTI 4 mg Tab tablet        senna-docusate (PERICOLACE) 8.6-50 mg tablet Take 2 tablets by mouth.       tiotropium (SPIRIVA) 18 mcg inhalation capsule Place 18 mcg into inhaler and inhale.       traMADol (ULTRAM) 50 mg tablet Take  mg by mouth.       triamcinolone (KENALOG) 0.1 % cream Apply 1 application topically. As needed for upper extremity rash       warfarin (COUMADIN) 10 MG tablet daily. 12 mg at bedtime every Mon; 9 mg at bedtime every Sun, Tues, Wed, Thu, Fri, Sat        hydroCHLOROthiazide (HYDRODIURIL) 50 MG tablet Take 50 mg by mouth daily.       No current facility-administered medications for this visit.        Allergies   Allergen Reactions     Erythromycin Anaphylaxis     Hydroxyzine      [place] Mushroom Hives     mushrooms     Mushroom Hives     Penicillins Hives     Lamotrigine Rash     Other reaction(s): rash       Physical Exam:    Vitals:    10/04/18 1010   BP: 110/70   Pulse: 88   Resp: 20   Temp: 98.2  F (36.8  C)    There is no height or weight on file to calculate BMI.    General:  63 y.o. female in no apparent distress.    Head:  Normocephalic atraumatic  Psychiatric: Cooperative.   Respiratory: unlabored breathing.    Integumentary:      Back Ulceration(s): Resolved.      Wound 06/07/18 Low Back  (Active)   Pre Size Length 0 10/4/2018 10:00 AM   Pre Size Width 0 10/4/2018 10:00 AM   Pre  Size Depth 0 10/4/2018 10:00 AM   Pre Total Sq cm 0 10/4/2018 10:00 AM   Post Size Length 3 9/13/2018  1:00 PM   Post Size Width 0.2 9/13/2018  1:00 PM   Post Size Depth 1.1 9/13/2018  1:00 PM     Assessment:    Images:  none pertinent    Labs:    The following labs were reviewed by me today.    Lab Results   Component Value Date    WBC 9.7 09/10/2018    HGB 8.1 (L) 04/26/2018    HCT 26.9 (L) 04/26/2018     04/26/2018     04/26/2018             Lab Results   Component Value Date    CREATININE 0.84 02/28/2017         Lab Results   Component Value Date    BUN 5 (L) 02/28/2017     No results found for: PREALBUMINESUFAST(LABPROT,LABALBU,albumin)@  Invalid input(s): LABALBU  No results found for: PREALBUMIN    Lab Results   Component Value Date    CRP 1.2 (H) 09/10/2018     Lab Results   Component Value Date    SEDRATE 71 (H) 09/10/2018       Lab Results   Component Value Date    HGBA1C 8.9 (H) 03/03/2016     Lab Results   Component Value Date    TSH 3.02 02/27/2017           Vitamin D, Total (25-Hydroxy)   Date Value Ref Range Status   01/08/2015 33.6 30.0 - 80.0 ng/mL Final     No results found for: BNP     1. Wound disruption, subsequent encounter     2. Bipolar 1 disorder (H)     3. Obesity (BMI 30-39.9)     4. Anemia, unspecified type     5. Diabetes (H)         A new wound was identified today: no.    Plan:  1. Sacral ulcer secondary to surgery, resolved.    2.  Diabetes, A1c: 6.0 and Diabetic management by Endocrinologist     4.  Anemia, on iron supplement.    5.  Treatment:  Will discontinue dressings.    6.   Patient will follow up with me prn.      Mamie Munroe, APRN, CNP,  CWCN  [facility]  [phone]

## 2021-06-26 NOTE — PROGRESS NOTES
Progress Notes by Mamie Munroe CNP at 6/7/2018  3:00 PM     Author: Mamie Munroe CNP Service: -- Author Type: Nurse Practitioner    Filed: 6/7/2018  4:21 PM Encounter Date: 6/7/2018 Status: Signed    : Mamie Munroe CNP (Nurse Practitioner)       [facility] Consult Note    Date of Service:  6/7/2018    Requesting Provider: Manuelito Richey PA-C    History of Present Illness:     Manuelito Richey PA-C referred Ginna Mireles for back incision.   The patient presents to clinic alone. She had back surgery on April 17 at [facility] for a anterior/posterior fusion L3-S1 with pelvic fixation.  She currently resides in a care center and the staff is applying a mepilex border dressing two times a day.  She believes that the drainage has decreased, but continues to have pain.  She does not believe that anything she does helps to reduce the pain.  She is wearing her brace and has another month before it is discontinued.  The brace does not rub on the incision.       Review of Symptoms:    GI: Appetite is fair,   Taking Nutritional supplements: No  Weight:  unintentional loss, but was minimal    Cardiovascular:  denies Chest pain or discomfort;         Edema: There is moderate  edema noted in bilateral LE.  Wear compression stockings    Respiratory:  denies unusual shortness of breath    Bowels: No concerns     : No concerns.       MSK: Patient is ambulatory; does use an assistive device :Wheelchair.  Wears back brace when out of bed.     Neurological:   reports numbness, tingling in feet bilaterally.    Endocrine: is diabetic for 3years.  A1c: 6.0 and Diabetic management by Endocrinologist     All other systems were reviewed are not pertinent to the presenting problem.    Past Medical History:    Past Medical History:   Diagnosis Date   ? Acute encephalopathy 2/27/2017   ? Allergic contact dermatitis of eyelid 6/24/2010   ? Anemia    ? Anxiety disorder    ? Cervical disc  disease with myelopathy 11/25/2015   ? Cervicalgia    ? Chronic embolism and thrombosis of unspecified vein    ? Chronic pain 2/27/2017   ? Cirrhosis of liver    ? Contact dermatitis 11/25/2015   ? COPD (chronic obstructive pulmonary disease)    ? Diabetes type 2, uncontrolled 11/25/2015   ? Disease of thyroid gland    ? Dry eyes 6/24/2010   ? Enthesopathy 11/25/2015   ? Essential hypertension 11/25/2015   ? GERD (gastroesophageal reflux disease) 11/25/2015   ? Heart failure    ? History of pulmonary embolus (PE) 11/25/2015   ? Hyperlipidemia 11/25/2015   ? Hypertension    ? Hypoglycemia 4/22/2018   ? Lactic acidosis 2/27/2017   ? Liver function abnormality 11/25/2015   ? Lumbar radiculopathy, chronic 11/25/2015   ? Major depressive disorder    ? Muscle weakness    ? Obesity (BMI 30-39.9) 11/25/2015   ? Other chronic allergic conjunctivitis 6/24/2010   ? Other chronic pain    ? Other idiopathic peripheral autonomic neuropathy    ? Personality disorder 10/14/2013    Overview:  Epic    ? Pneumonia    ? Pulmonary embolism without acute cor pulmonale    ? Scoliosis    ? Spinal stenosis    ? Spondylolisthesis of lumbar region 6/7/2018   ? Transaminitis 2/27/2017        Surgical History:   Past Surgical History:   Procedure Laterality Date   ? CHOLECYSTECTOMY     ? HYSTERECTOMY     ? NECK SURGERY     ? TONSILLECTOMY         Allergies:   Allergies   Allergen Reactions   ? Erythromycin Anaphylaxis   ? Hydroxyzine    ? [place] Mushroom Hives     mushrooms   ? Mushroom    ? Penicillins Hives          Medications:    Current Outpatient Prescriptions:   ?  artificial tears,hypromellose, (GENTEAL) 0.3 % Drop, Administer 1 drop to both eyes as needed. , Disp: , Rfl:   ?  blood glucose test (ACCU-CHEK ACTIVE TEST) strips, Use 1 each As Directed 2 (two) times a day. Dispense brand per patient's insurance at pharmacy discretion., Disp: , Rfl:   ?  brexpiprazole (REXULTI) 3 mg Tab tablet, Take 3 mg by mouth daily., Disp: , Rfl:   ?   calcium, as carbonate, (TUMS) 200 mg calcium (500 mg) chewable tablet, Chew 1 tablet 2 (two) times a day., Disp: , Rfl:   ?  cetirizine (ZYRTEC) 10 MG tablet, Take 10 mg by mouth daily., Disp: , Rfl:   ?  cetirizine (ZYRTEC) 10 MG tablet, Take 10 mg by mouth., Disp: , Rfl:   ?  citalopram (CELEXA) 40 MG tablet, Take 40 mg by mouth., Disp: , Rfl:   ?  clindamycin (CLEOCIN) 300 MG capsule, Take 300 mg by mouth., Disp: , Rfl:   ?  cyclobenzaprine (FLEXERIL) 10 MG tablet, Take 10 mg by mouth 3 (three) times a day as needed for muscle spasms., Disp: , Rfl:   ?  dextrose (GLUTOSE) 40 % gel, Take 15 g by mouth as needed (Low blood sugar)., Disp: , Rfl:   ?  dextrose (GLUTOSE) 40 % gel, Take 15 g by mouth., Disp: , Rfl:   ?  diphenhydrAMINE (BENADRYL) 25 mg capsule, Tab 1 at bedtime as needed for sleep, Disp: , Rfl:   ?  dulaglutide (TRULICITY) 1.5 mg/0.5 mL PnIj, Inject 1.5 mg under the skin every 7 days., Disp: , Rfl:   ?  emollient (EMOLLIENT) Crea, Apply 1 application topically as needed., Disp: , Rfl:   ?  emollient (EMOLLIENT) Crea, Apply topically., Disp: , Rfl:   ?  ergocalciferol (ERGOCALCIFEROL) 50,000 unit capsule, Take 50,000 Units by mouth., Disp: , Rfl:   ?  ergocalciferol (VITAMIN D2) 50,000 unit capsule, Take 50,000 Units by mouth once a week. Every Friday, Disp: , Rfl:   ?  eszopiclone (LUNESTA) tablet, Take 3 mg by mouth bedtime as needed for sleep. Take immediately before bedtime, Disp: , Rfl:   ?  exenatide microspheres (BYDUREON) 2 mg/0.65 mL PnIj extended release injection, Inject 2 mg under the skin., Disp: , Rfl:   ?  exenatide microspheres (BYDUREON) 2 mg/0.65 mL PnIj, Inject 2 mg under the skin every 7 days., Disp: , Rfl:   ?  ferrous sulfate 325 (65 FE) MG tablet, Take 1 tablet by mouth daily with breakfast., Disp: , Rfl:   ?  ferrous sulfate 325 (65 FE) MG tablet, Take 325 mg by mouth., Disp: , Rfl:   ?  gabapentin (NEURONTIN) 300 MG capsule, Take 300 mg by mouth., Disp: , Rfl:   ?  glimepiride  (AMARYL) 4 MG tablet, Take 4 mg by mouth 2 (two) times a day., Disp: , Rfl:   ?  glimepiride (AMARYL) 4 MG tablet, Take 4 mg by mouth., Disp: , Rfl:   ?  GLUCAGON,HUMAN RECOMBINANT (GLUCAGON EMERGENCY KIT, HUMAN, INJ), Inject 1 mg as directed as needed (Low blood sugar)., Disp: , Rfl:   ?  GUAIFENESIN (DIABETIC TUSSIN MUCUS RELIEF ORAL), Take 200 mg by mouth every 4 (four) hours as needed. , Disp: , Rfl:   ?  guaiFENesin (ROBITUSSIN) 100 mg/5 mL syrup, Take 100 mg by mouth every 4 (four) hours as needed for cough., Disp: , Rfl:   ?  hydroCHLOROthiazide (HYDRODIURIL) 50 MG tablet, Take 50 mg by mouth daily., Disp: , Rfl:   ?  insulin aspart (NOVOLOG) 100 unit/mL injection pen, 34 units at breakfast, 11 units at lunch, and 14 units at dinner plus sliding scale:, Disp: 3 mL, Rfl: 0  ?  ipratropium-albuterol (DUO-NEB) 0.5-2.5 mg/3 mL nebulizer, Inhale 3 mL., Disp: , Rfl:   ?  lamoTRIgine (LAMICTAL) 100 MG tablet, Take 300 mg by mouth bedtime., Disp: , Rfl:   ?  LANTUS SOLOSTAR 100 unit/mL (3 mL) pen, Inject 12 Units under the skin every morning. 11.9 Type 2 without complications, Disp: 3 mL, Rfl: PRN  ?  levothyroxine (SYNTHROID, LEVOTHROID) 150 MCG tablet, Take 150 mcg by mouth daily., Disp: , Rfl:   ?  lidocaine (XYLOCAINE) 2 % jelly, Apply topically as needed., Disp: , Rfl:   ?  lisinopril (PRINIVIL,ZESTRIL) 2.5 MG tablet, Take 2.5 mg by mouth daily. Hold if SBP is less than 110, Disp: , Rfl:   ?  loperamide (IMODIUM) 2 mg capsule, Take 2 mg by mouth 4 (four) times a day as needed for diarrhea (Do not exceed 8 tabs daily)., Disp: , Rfl:   ?  metFORMIN (GLUCOPHAGE) 1000 MG tablet, Take 1,000 mg by mouth 2 (two) times a day with meals., Disp: , Rfl:   ?  mometasone-formoterol (DULERA) 100-5 mcg/actuation HFAA inhaler, Inhale 2 puffs 2 (two) times a day., Disp: , Rfl:   ?  mometasone-formoterol (DULERA) 100-5 mcg/actuation HFAA inhaler, Inhale 2 puffs., Disp: , Rfl:   ?  NOVOLOG FLEXPEN 100 unit/mL injection pen, Inject  3 times a day with meals:  - 299=2 units  - 349=4 units  - 399=6 units  - 999=8 units, Disp: 3 mL, Rfl: PRN  ?  olopatadine (PATANOL) 0.1 % ophthalmic solution, Administer 1 drop to both eyes 2 (two) times a day. , Disp: , Rfl:   ?  olopatadine (PATANOL) 0.1 % ophthalmic solution, 1 Drop two times a day., Disp: , Rfl:   ?  omeprazole (PRILOSEC) 20 MG capsule, Take 20 mg by mouth daily., Disp: , Rfl:   ?  senna-docusate (PERICOLACE) 8.6-50 mg tablet, Take 2 tablets by mouth., Disp: , Rfl:   ?  tiotropium (SPIRIVA) 18 mcg inhalation capsule, Place 18 mcg into inhaler and inhale daily., Disp: , Rfl:   ?  tiotropium (SPIRIVA) 18 mcg inhalation capsule, Place 18 mcg into inhaler and inhale., Disp: , Rfl:   ?  traMADol (ULTRAM) 50 mg tablet, Take 2 tablets (100 mg total) by mouth 3 (three) times a day. Not to exceed 6 tabs per day, Disp: 10 tablet, Rfl: 0  ?  traMADol (ULTRAM) 50 mg tablet, Take  mg by mouth., Disp: , Rfl:   ?  triamcinolone (KENALOG) 0.1 % cream, Apply 1 application topically. As needed for upper extremity rash, Disp: , Rfl:   ?  warfarin (COUMADIN) 10 MG tablet, daily. 12 mg at bedtime every Mon; 9 mg at bedtime every Sun, Tues, Wed, Thu, Fri, Sat , Disp: , Rfl:   ?  calcium carbonate (CALCIUM 600) 600 mg calcium (1,500 mg) tablet, 500 mg., Disp: , Rfl:   ?  diazePAM (VALIUM) 5 MG tablet, , Disp: , Rfl:   ?  glucagon, human recombinant, 1 mg injection, 1 mg., Disp: , Rfl:   ?  INVOKANA 300 mg Tab, , Disp: , Rfl:   ?  levothyroxine (SYNTHROID, LEVOTHROID) 137 MCG tablet, , Disp: , Rfl:   ?  metFORMIN (GLUCOPHAGE-XR) 500 MG 24 hr tablet, , Disp: , Rfl:   ?  mirtazapine (REMERON) 45 MG tablet, , Disp: , Rfl:   ?  NOVOLOG U-100 INSULIN ASPART 100 unit/mL injection, , Disp: , Rfl:   ?  omeprazole (PRILOSEC) 20 MG capsule, Take 20 mg by mouth., Disp: , Rfl:   ?  oxyCODONE (ROXICODONE) 5 MG immediate release tablet, , Disp: , Rfl:   ?  polyvinyl alcohol (TEARFAIR) 1.4 % ophthalmic  solution, Indications: Instill 1 drop in both eyes as needed for dry eyes 1-2 drops, QD, Disp: , Rfl:   ?  povidone-iodine 10 % swab, , Disp: , Rfl:   ?  pravastatin (PRAVACHOL) 20 MG tablet, , Disp: , Rfl:   ?  REXULTI 4 mg Tab tablet, , Disp: , Rfl:     Family history:   Family History   Problem Relation Age of Onset   ? Cataracts Mother    ? Lung cancer Father    ? HIV Brother    ? Diabetes Sister          Social History:   Social History     Social History   ? Marital status:      Spouse name: N/A   ? Number of children: N/A   ? Years of education: N/A     Occupational History   ? Not on file.     Social History Main Topics   ? Smoking status: Never Smoker   ? Smokeless tobacco: Never Used   ? Alcohol use No   ? Drug use: No   ? Sexual activity: Not on file     Other Topics Concern   ? Not on file     Social History Narrative    Lives in a TCU at this time.  6/7/2018        Physical Exam    General: This is a 62 y.o. female who appears their reported age, not in acute distress    Constitution:  Vital Signs: /64  Pulse 74  Temp 98.7  F (37.1  C)  Resp 16 There is no height or weight on file to calculate BMI.    Psychiatric: Alert and oriented X 3, in no apparent distress. Calm and cooperative throughout exam    Head/Neck:  Normocephalic, atraumatic    Cardiovascular:  Rate and Rhythm is regular    Respiratory:  Lungs are clear to auscultation in all fields bilaterally.     Abdomen:  Normal bowel sounds. Soft, symmetric, no guarding or rigidity, non tender with palpation.  No organomegaly or masses palpated.     Integumentary/Hair/Nails:  Turgor and texture are normal.  Nails are normal.    MSK:  BilateralNo ROM deficit in foot/feet    Neurological:  Grossly intact.     Ulceration(s)/Wound(s):     Sacral Ulceration(s):     Wound bed: %100 granulation          Undermining no, Tunneling yes   Wound Edge: attached   Periwound:  There is no teresa wound erythema, induration, maceration, denudement  fluctuance or warmth surrounding the ulcer(s).  Exudate: moderate serosanguineous    Odor:  absent   Wound Shape round    Wound 06/07/18 Low Back  (Active)   Pre Size Length 1.8 6/7/2018  3:00 PM   Pre Size Width 0.3 6/7/2018  3:00 PM   Pre Size Depth 0.2 6/7/2018  3:00 PM   Pre Total Sq cm 0.54 6/7/2018  3:00 PM       Photo     6/7/2018    Laboratory studies:   Personally reviewed today.    Lab Results   Component Value Date    WBC 10.0 05/24/2018    HGB 8.1 (L) 04/26/2018    HCT 26.9 (L) 04/26/2018     04/26/2018     04/26/2018     Lab Results   Component Value Date    CREATININE 0.84 02/28/2017     Lab Results   Component Value Date    BUN 5 (L) 02/28/2017     Lab Results   Component Value Date    CRP 4.1 (H) 05/24/2018     Lab Results   Component Value Date    SEDRATE 112 (H) 05/24/2018     Lab Results   Component Value Date    ALBUMIN 2.8 (L) 02/28/2017     Lab Results   Component Value Date    HGBA1C 8.9 (H) 03/03/2016       Lab Results   Component Value Date    TSH 3.02 02/27/2017         No results found for: BNP  Results for orders placed or performed during the hospital encounter of 02/27/17   Comprehensive metabolic panel   Result Value Ref Range    Sodium 135 (L) 136 - 145 mmol/L    Potassium 4.2 3.5 - 5.0 mmol/L    Chloride 106 98 - 107 mmol/L    CO2 22 22 - 31 mmol/L    Anion Gap, Calculation 7 5 - 18 mmol/L    Glucose 230 (H) 70 - 125 mg/dL    BUN 5 (L) 8 - 22 mg/dL    Creatinine 0.84 0.60 - 1.10 mg/dL    GFR MDRD Af Amer >60 >60 mL/min/1.73m2    GFR MDRD Non Af Amer >60 >60 mL/min/1.73m2    Bilirubin, Total 0.5 0.0 - 1.0 mg/dL    Calcium 8.9 8.5 - 10.5 mg/dL    Protein, Total 7.7 6.0 - 8.0 g/dL    Albumin 2.8 (L) 3.5 - 5.0 g/dL    Alkaline Phosphatase 225 (H) 45 - 120 U/L    AST 58 (H) 0 - 40 U/L    ALT 31 0 - 45 U/L     Vitamin D, Total (25-Hydroxy)   Date Value Ref Range Status   01/08/2015 33.6 30.0 - 80.0 ng/mL Final       Imaging:  None pertinent     Assessment:  1. Wound  disruption, initial encounter  Change dressing   2. Bipolar 1 disorder     3. Diabetes     4. Anemia     5. Chronic obstructive pulmonary disease, unspecified COPD type     6. Chronic embolism and thrombosis of unspecified vein     7. Cirrhosis of liver     8. Major depressive disorder     9. Other idiopathic peripheral autonomic neuropathy     10. Other chronic pain     11. Skin ulcer of sacrum, unspecified ulcer stage         Assessment/Plan:  1.  No debridement secondary to the size of the opening.      2. Sacral ulcer secondary to surgery.  No signs of infection today.  Will need packing to promote healing and prevent abscess.    3.  Nutrition: Encouraged high protein foods and/or nutritional supplements, including protein powder    4.  Treatment Will pack with nugauze and silversorb to decrease the bacterial load.  Will cover with  Mepilex Border and change  every other day    5. Patient to return to clinic in two week(s) for reevaluation. They were instructed to call the clinic sooner with any further questions or concerns. Answered all questions.      Mamie Munroe, APRN, CNP, [facility]  [phone]

## 2021-08-26 ENCOUNTER — LAB REQUISITION (OUTPATIENT)
Dept: LAB | Facility: CLINIC | Age: 66
End: 2021-08-26
Payer: COMMERCIAL

## 2021-08-26 DIAGNOSIS — E03.9 HYPOTHYROIDISM, UNSPECIFIED: ICD-10-CM

## 2021-08-27 LAB — TSH SERPL DL<=0.005 MIU/L-ACNC: 0.89 UIU/ML (ref 0.3–5)

## 2021-08-27 PROCEDURE — P9604 ONE-WAY ALLOW PRORATED TRIP: HCPCS | Mod: ORL | Performed by: FAMILY MEDICINE

## 2021-08-27 PROCEDURE — 84443 ASSAY THYROID STIM HORMONE: CPT | Mod: ORL | Performed by: FAMILY MEDICINE

## 2021-08-27 PROCEDURE — 36415 COLL VENOUS BLD VENIPUNCTURE: CPT | Mod: ORL | Performed by: FAMILY MEDICINE

## 2021-10-18 ENCOUNTER — TELEPHONE (OUTPATIENT)
Dept: DERMATOLOGY | Facility: CLINIC | Age: 66
End: 2021-10-18

## 2021-10-18 NOTE — TELEPHONE ENCOUNTER
[facility] Call Center    Phone Message    May a detailed message be left on voicemail: yes     Reason for Call: Other: Tolu from Physical Therapy states he did receive the form he had sent to Dr. Alvarado and it was signed; however, Dr. Alvarado did not norma if it was approved or denied. Please norma appropriately and resend to F: [phone] or call P: [phone]. Phone is a secured line so it is okay to leave a detailed message. Thank you.      Action Taken: Message routed to:  [facility] ([facility] Derm Surg    Travel Screening: Not Applicable

## 2021-10-18 NOTE — TELEPHONE ENCOUNTER
Left message at the call back number with a  to re-fax the orders they need Dr. Alvarado to sign.

## 2022-01-01 ENCOUNTER — LAB REQUISITION (OUTPATIENT)
Dept: LAB | Facility: CLINIC | Age: 67
End: 2022-01-01
Payer: COMMERCIAL

## 2022-01-01 DIAGNOSIS — Z11.52 ENCOUNTER FOR SCREENING FOR COVID-19: ICD-10-CM

## 2022-01-01 LAB — SARS-COV-2 RNA RESP QL NAA+PROBE: NEGATIVE

## 2022-01-01 PROCEDURE — U0005 INFEC AGEN DETEC AMPLI PROBE: HCPCS | Mod: ORL | Performed by: FAMILY MEDICINE

## 2022-02-21 ENCOUNTER — LAB REQUISITION (OUTPATIENT)
Dept: LAB | Facility: CLINIC | Age: 67
End: 2022-02-21

## 2022-02-21 DIAGNOSIS — Z11.59 ENCOUNTER FOR SCREENING FOR OTHER VIRAL DISEASES: ICD-10-CM

## 2023-01-01 ENCOUNTER — APPOINTMENT (OUTPATIENT)
Dept: RADIOLOGY | Facility: HOSPITAL | Age: 68
DRG: 871 | End: 2023-01-01
Payer: COMMERCIAL

## 2023-01-01 ENCOUNTER — APPOINTMENT (OUTPATIENT)
Dept: PHYSICAL THERAPY | Facility: HOSPITAL | Age: 68
DRG: 871 | End: 2023-01-01
Payer: COMMERCIAL

## 2023-01-01 ENCOUNTER — HOSPITAL ENCOUNTER (INPATIENT)
Facility: HOSPITAL | Age: 68
LOS: 16 days | DRG: 871 | End: 2023-08-13
Attending: EMERGENCY MEDICINE | Admitting: FAMILY MEDICINE
Payer: COMMERCIAL

## 2023-01-01 ENCOUNTER — APPOINTMENT (OUTPATIENT)
Dept: CARDIOLOGY | Facility: HOSPITAL | Age: 68
DRG: 871 | End: 2023-01-01
Attending: FAMILY MEDICINE
Payer: COMMERCIAL

## 2023-01-01 ENCOUNTER — APPOINTMENT (OUTPATIENT)
Dept: RADIOLOGY | Facility: HOSPITAL | Age: 68
DRG: 871 | End: 2023-01-01
Attending: INTERNAL MEDICINE
Payer: COMMERCIAL

## 2023-01-01 ENCOUNTER — APPOINTMENT (OUTPATIENT)
Dept: RADIOLOGY | Facility: HOSPITAL | Age: 68
DRG: 871 | End: 2023-01-01
Attending: FAMILY MEDICINE
Payer: COMMERCIAL

## 2023-01-01 ENCOUNTER — APPOINTMENT (OUTPATIENT)
Dept: INTERVENTIONAL RADIOLOGY/VASCULAR | Facility: HOSPITAL | Age: 68
DRG: 871 | End: 2023-01-01
Attending: NURSE PRACTITIONER
Payer: COMMERCIAL

## 2023-01-01 ENCOUNTER — APPOINTMENT (OUTPATIENT)
Dept: RADIOLOGY | Facility: HOSPITAL | Age: 68
DRG: 871 | End: 2023-01-01
Attending: NURSE PRACTITIONER
Payer: COMMERCIAL

## 2023-01-01 ENCOUNTER — APPOINTMENT (OUTPATIENT)
Dept: CARDIOLOGY | Facility: HOSPITAL | Age: 68
DRG: 871 | End: 2023-01-01
Payer: COMMERCIAL

## 2023-01-01 ENCOUNTER — APPOINTMENT (OUTPATIENT)
Dept: ULTRASOUND IMAGING | Facility: HOSPITAL | Age: 68
DRG: 871 | End: 2023-01-01
Attending: INTERNAL MEDICINE
Payer: COMMERCIAL

## 2023-01-01 ENCOUNTER — APPOINTMENT (OUTPATIENT)
Dept: MRI IMAGING | Facility: HOSPITAL | Age: 68
DRG: 871 | End: 2023-01-01
Payer: COMMERCIAL

## 2023-01-01 ENCOUNTER — APPOINTMENT (OUTPATIENT)
Dept: CT IMAGING | Facility: HOSPITAL | Age: 68
DRG: 871 | End: 2023-01-01
Attending: NURSE PRACTITIONER
Payer: COMMERCIAL

## 2023-01-01 ENCOUNTER — APPOINTMENT (OUTPATIENT)
Dept: CT IMAGING | Facility: HOSPITAL | Age: 68
DRG: 871 | End: 2023-01-01
Attending: INTERNAL MEDICINE
Payer: COMMERCIAL

## 2023-01-01 ENCOUNTER — APPOINTMENT (OUTPATIENT)
Dept: CT IMAGING | Facility: HOSPITAL | Age: 68
DRG: 871 | End: 2023-01-01
Attending: EMERGENCY MEDICINE
Payer: COMMERCIAL

## 2023-01-01 ENCOUNTER — APPOINTMENT (OUTPATIENT)
Dept: CT IMAGING | Facility: HOSPITAL | Age: 68
DRG: 871 | End: 2023-01-01
Payer: COMMERCIAL

## 2023-01-01 ENCOUNTER — APPOINTMENT (OUTPATIENT)
Dept: ULTRASOUND IMAGING | Facility: HOSPITAL | Age: 68
DRG: 871 | End: 2023-01-01
Payer: COMMERCIAL

## 2023-01-01 ENCOUNTER — APPOINTMENT (OUTPATIENT)
Dept: RADIOLOGY | Facility: HOSPITAL | Age: 68
DRG: 871 | End: 2023-01-01
Attending: EMERGENCY MEDICINE
Payer: COMMERCIAL

## 2023-01-01 ENCOUNTER — APPOINTMENT (OUTPATIENT)
Dept: RADIOLOGY | Facility: HOSPITAL | Age: 68
DRG: 871 | End: 2023-01-01
Attending: PHYSICIAN ASSISTANT
Payer: COMMERCIAL

## 2023-01-01 VITALS
TEMPERATURE: 97.8 F | HEIGHT: 65 IN | BODY MASS INDEX: 37.13 KG/M2 | DIASTOLIC BLOOD PRESSURE: 52 MMHG | WEIGHT: 222.88 LBS | OXYGEN SATURATION: 84 % | HEART RATE: 78 BPM | SYSTOLIC BLOOD PRESSURE: 91 MMHG | RESPIRATION RATE: 19 BRPM

## 2023-01-01 DIAGNOSIS — A41.9 SEPSIS, DUE TO UNSPECIFIED ORGANISM, UNSPECIFIED WHETHER ACUTE ORGAN DYSFUNCTION PRESENT (H): ICD-10-CM

## 2023-01-01 DIAGNOSIS — L03.116 LEFT LEG CELLULITIS: ICD-10-CM

## 2023-01-01 DIAGNOSIS — R41.82 ALTERED MENTAL STATUS, UNSPECIFIED ALTERED MENTAL STATUS TYPE: ICD-10-CM

## 2023-01-01 DIAGNOSIS — R78.81 GRAM-POSITIVE BACTEREMIA: Primary | ICD-10-CM

## 2023-01-01 LAB
ALBUMIN SERPL BCG-MCNC: 2 G/DL (ref 3.5–5.2)
ALBUMIN SERPL BCG-MCNC: 2 G/DL (ref 3.5–5.2)
ALBUMIN SERPL BCG-MCNC: 2.1 G/DL (ref 3.5–5.2)
ALBUMIN SERPL BCG-MCNC: 2.2 G/DL (ref 3.5–5.2)
ALBUMIN SERPL BCG-MCNC: 2.3 G/DL (ref 3.5–5.2)
ALBUMIN SERPL BCG-MCNC: 2.3 G/DL (ref 3.5–5.2)
ALBUMIN SERPL BCG-MCNC: 2.5 G/DL (ref 3.5–5.2)
ALBUMIN SERPL BCG-MCNC: 2.6 G/DL (ref 3.5–5.2)
ALBUMIN SERPL BCG-MCNC: 2.6 G/DL (ref 3.5–5.2)
ALBUMIN SERPL BCG-MCNC: 2.7 G/DL (ref 3.5–5.2)
ALBUMIN SERPL BCG-MCNC: 2.9 G/DL (ref 3.5–5.2)
ALBUMIN UR-MCNC: NEGATIVE MG/DL
ALP SERPL-CCNC: 159 U/L (ref 35–104)
ALP SERPL-CCNC: 165 U/L (ref 35–104)
ALP SERPL-CCNC: 176 U/L (ref 35–104)
ALP SERPL-CCNC: 183 U/L (ref 35–104)
ALP SERPL-CCNC: 187 U/L (ref 35–104)
ALP SERPL-CCNC: 207 U/L (ref 35–104)
ALP SERPL-CCNC: 230 U/L (ref 35–104)
ALP SERPL-CCNC: 233 U/L (ref 35–104)
ALP SERPL-CCNC: 244 U/L (ref 35–104)
ALP SERPL-CCNC: 247 U/L (ref 35–104)
ALP SERPL-CCNC: 260 U/L (ref 35–104)
ALP SERPL-CCNC: 266 U/L (ref 35–104)
ALP SERPL-CCNC: 272 U/L (ref 35–104)
ALP SERPL-CCNC: 364 U/L (ref 35–104)
ALP SERPL-CCNC: 369 U/L (ref 35–104)
ALT SERPL W P-5'-P-CCNC: 10 U/L (ref 0–50)
ALT SERPL W P-5'-P-CCNC: 12 U/L (ref 0–50)
ALT SERPL W P-5'-P-CCNC: 12 U/L (ref 0–50)
ALT SERPL W P-5'-P-CCNC: 13 U/L (ref 0–50)
ALT SERPL W P-5'-P-CCNC: 14 U/L (ref 0–50)
ALT SERPL W P-5'-P-CCNC: 17 U/L (ref 0–50)
ALT SERPL W P-5'-P-CCNC: 22 U/L (ref 0–50)
ALT SERPL W P-5'-P-CCNC: 24 U/L (ref 0–50)
ALT SERPL W P-5'-P-CCNC: 40 U/L (ref 0–50)
ALT SERPL W P-5'-P-CCNC: 5 U/L (ref 0–50)
ALT SERPL W P-5'-P-CCNC: 55 U/L (ref 0–50)
ALT SERPL W P-5'-P-CCNC: 6 U/L (ref 0–50)
ALT SERPL W P-5'-P-CCNC: 7 U/L (ref 0–50)
ALT SERPL W P-5'-P-CCNC: <5 U/L (ref 0–50)
ALT SERPL W P-5'-P-CCNC: <5 U/L (ref 0–50)
AMMONIA PLAS-SCNC: 16 UMOL/L (ref 11–51)
AMMONIA PLAS-SCNC: 20 UMOL/L (ref 11–51)
ANION GAP SERPL CALCULATED.3IONS-SCNC: 11 MMOL/L (ref 7–15)
ANION GAP SERPL CALCULATED.3IONS-SCNC: 12 MMOL/L (ref 7–15)
ANION GAP SERPL CALCULATED.3IONS-SCNC: 12 MMOL/L (ref 7–15)
ANION GAP SERPL CALCULATED.3IONS-SCNC: 14 MMOL/L (ref 7–15)
ANION GAP SERPL CALCULATED.3IONS-SCNC: 14 MMOL/L (ref 7–15)
ANION GAP SERPL CALCULATED.3IONS-SCNC: 18 MMOL/L (ref 7–15)
ANION GAP SERPL CALCULATED.3IONS-SCNC: 7 MMOL/L (ref 7–15)
ANION GAP SERPL CALCULATED.3IONS-SCNC: 8 MMOL/L (ref 7–15)
ANION GAP SERPL CALCULATED.3IONS-SCNC: 9 MMOL/L (ref 7–15)
ANION GAP SERPL CALCULATED.3IONS-SCNC: 9 MMOL/L (ref 7–15)
APPEARANCE FLD: ABNORMAL
APPEARANCE UR: CLEAR
AST SERPL W P-5'-P-CCNC: 127 U/L (ref 0–45)
AST SERPL W P-5'-P-CCNC: 25 U/L (ref 0–45)
AST SERPL W P-5'-P-CCNC: 26 U/L (ref 0–45)
AST SERPL W P-5'-P-CCNC: 30 U/L (ref 0–45)
AST SERPL W P-5'-P-CCNC: 34 U/L (ref 0–45)
AST SERPL W P-5'-P-CCNC: 39 U/L (ref 0–45)
AST SERPL W P-5'-P-CCNC: 40 U/L (ref 0–45)
AST SERPL W P-5'-P-CCNC: 41 U/L (ref 0–45)
AST SERPL W P-5'-P-CCNC: 42 U/L (ref 0–45)
AST SERPL W P-5'-P-CCNC: 44 U/L (ref 0–45)
AST SERPL W P-5'-P-CCNC: 46 U/L (ref 0–45)
AST SERPL W P-5'-P-CCNC: 46 U/L (ref 0–45)
AST SERPL W P-5'-P-CCNC: 47 U/L (ref 0–45)
AST SERPL W P-5'-P-CCNC: 83 U/L (ref 0–45)
AST SERPL W P-5'-P-CCNC: 91 U/L (ref 0–45)
ATRIAL RATE - MUSE: 128 BPM
ATRIAL RATE - MUSE: 76 BPM
B-OH-BUTYR SERPL-SCNC: 1.7 MMOL/L
BACTERIA BLD CULT: ABNORMAL
BACTERIA BLD CULT: NO GROWTH
BACTERIA PLR CULT: NO GROWTH
BASE EXCESS BLDA CALC-SCNC: 4.2 MMOL/L
BASE EXCESS BLDA CALC-SCNC: 4.3 MMOL/L
BASE EXCESS BLDV CALC-SCNC: -4.3 MMOL/L
BASE EXCESS BLDV CALC-SCNC: 5.3 MMOL/L
BASOPHILS # BLD AUTO: 0 10E3/UL (ref 0–0.2)
BASOPHILS NFR BLD AUTO: 0 %
BILIRUB DIRECT SERPL-MCNC: 0.66 MG/DL (ref 0–0.3)
BILIRUB SERPL-MCNC: 0.4 MG/DL
BILIRUB SERPL-MCNC: 0.5 MG/DL
BILIRUB SERPL-MCNC: 0.6 MG/DL
BILIRUB SERPL-MCNC: 0.6 MG/DL
BILIRUB SERPL-MCNC: 0.7 MG/DL
BILIRUB SERPL-MCNC: 1.3 MG/DL
BILIRUB UR QL STRIP: NEGATIVE
BUN SERPL-MCNC: 14.8 MG/DL (ref 8–23)
BUN SERPL-MCNC: 15.9 MG/DL (ref 8–23)
BUN SERPL-MCNC: 16.7 MG/DL (ref 8–23)
BUN SERPL-MCNC: 16.7 MG/DL (ref 8–23)
BUN SERPL-MCNC: 18.1 MG/DL (ref 8–23)
BUN SERPL-MCNC: 18.8 MG/DL (ref 8–23)
BUN SERPL-MCNC: 19.6 MG/DL (ref 8–23)
BUN SERPL-MCNC: 19.8 MG/DL (ref 8–23)
BUN SERPL-MCNC: 20.3 MG/DL (ref 8–23)
BUN SERPL-MCNC: 21.1 MG/DL (ref 8–23)
BUN SERPL-MCNC: 21.6 MG/DL (ref 8–23)
BUN SERPL-MCNC: 23.4 MG/DL (ref 8–23)
BUN SERPL-MCNC: 25.1 MG/DL (ref 8–23)
BUN SERPL-MCNC: 25.6 MG/DL (ref 8–23)
BUN SERPL-MCNC: 27.1 MG/DL (ref 8–23)
BUN SERPL-MCNC: 35.8 MG/DL (ref 8–23)
BUN SERPL-MCNC: 41.3 MG/DL (ref 8–23)
BUN SERPL-MCNC: 41.9 MG/DL (ref 8–23)
CALCIUM SERPL-MCNC: 10.1 MG/DL (ref 8.8–10.2)
CALCIUM SERPL-MCNC: 10.2 MG/DL (ref 8.8–10.2)
CALCIUM SERPL-MCNC: 10.3 MG/DL (ref 8.8–10.2)
CALCIUM SERPL-MCNC: 7.2 MG/DL (ref 8.8–10.2)
CALCIUM SERPL-MCNC: 7.4 MG/DL (ref 8.8–10.2)
CALCIUM SERPL-MCNC: 7.9 MG/DL (ref 8.8–10.2)
CALCIUM SERPL-MCNC: 8 MG/DL (ref 8.8–10.2)
CALCIUM SERPL-MCNC: 8.1 MG/DL (ref 8.8–10.2)
CALCIUM SERPL-MCNC: 8.2 MG/DL (ref 8.8–10.2)
CALCIUM SERPL-MCNC: 8.2 MG/DL (ref 8.8–10.2)
CALCIUM SERPL-MCNC: 8.6 MG/DL (ref 8.8–10.2)
CALCIUM SERPL-MCNC: 8.7 MG/DL (ref 8.8–10.2)
CALCIUM SERPL-MCNC: 8.7 MG/DL (ref 8.8–10.2)
CALCIUM SERPL-MCNC: 9.1 MG/DL (ref 8.8–10.2)
CALCIUM SERPL-MCNC: 9.4 MG/DL (ref 8.8–10.2)
CALCIUM SERPL-MCNC: 9.4 MG/DL (ref 8.8–10.2)
CALCIUM SERPL-MCNC: 9.7 MG/DL (ref 8.8–10.2)
CALCIUM SERPL-MCNC: 9.8 MG/DL (ref 8.8–10.2)
CELL COUNT BODY FLUID SOURCE: ABNORMAL
CHLORIDE SERPL-SCNC: 104 MMOL/L (ref 98–107)
CHLORIDE SERPL-SCNC: 104 MMOL/L (ref 98–107)
CHLORIDE SERPL-SCNC: 110 MMOL/L (ref 98–107)
CHLORIDE SERPL-SCNC: 112 MMOL/L (ref 98–107)
CHLORIDE SERPL-SCNC: 113 MMOL/L (ref 98–107)
CHLORIDE SERPL-SCNC: 113 MMOL/L (ref 98–107)
CHLORIDE SERPL-SCNC: 114 MMOL/L (ref 98–107)
CHLORIDE SERPL-SCNC: 115 MMOL/L (ref 98–107)
CHLORIDE SERPL-SCNC: 116 MMOL/L (ref 98–107)
CHLORIDE SERPL-SCNC: 117 MMOL/L (ref 98–107)
CHLORIDE SERPL-SCNC: 119 MMOL/L (ref 98–107)
CHLORIDE SERPL-SCNC: 99 MMOL/L (ref 98–107)
COHGB MFR BLD: 97.2 % (ref 95–96)
COHGB MFR BLD: 99.5 % (ref 95–96)
COLOR FLD: YELLOW
COLOR UR AUTO: YELLOW
CORTIS SERPL-MCNC: 24.7 UG/DL
CREAT SERPL-MCNC: 0.53 MG/DL (ref 0.51–0.95)
CREAT SERPL-MCNC: 0.57 MG/DL (ref 0.51–0.95)
CREAT SERPL-MCNC: 0.58 MG/DL (ref 0.51–0.95)
CREAT SERPL-MCNC: 0.65 MG/DL (ref 0.51–0.95)
CREAT SERPL-MCNC: 0.65 MG/DL (ref 0.51–0.95)
CREAT SERPL-MCNC: 0.66 MG/DL (ref 0.51–0.95)
CREAT SERPL-MCNC: 0.72 MG/DL (ref 0.51–0.95)
CREAT SERPL-MCNC: 0.74 MG/DL (ref 0.51–0.95)
CREAT SERPL-MCNC: 0.74 MG/DL (ref 0.51–0.95)
CREAT SERPL-MCNC: 0.75 MG/DL (ref 0.51–0.95)
CREAT SERPL-MCNC: 0.75 MG/DL (ref 0.51–0.95)
CREAT SERPL-MCNC: 0.81 MG/DL (ref 0.51–0.95)
CREAT SERPL-MCNC: 0.81 MG/DL (ref 0.51–0.95)
CREAT SERPL-MCNC: 0.94 MG/DL (ref 0.51–0.95)
CREAT SERPL-MCNC: 0.97 MG/DL (ref 0.51–0.95)
CREAT SERPL-MCNC: 1.12 MG/DL (ref 0.51–0.95)
CREAT SERPL-MCNC: 1.32 MG/DL (ref 0.51–0.95)
CREAT SERPL-MCNC: 1.34 MG/DL (ref 0.51–0.95)
CREAT SERPL-MCNC: 1.34 MG/DL (ref 0.51–0.95)
DEPRECATED HCO3 PLAS-SCNC: 19 MMOL/L (ref 22–29)
DEPRECATED HCO3 PLAS-SCNC: 20 MMOL/L (ref 22–29)
DEPRECATED HCO3 PLAS-SCNC: 22 MMOL/L (ref 22–29)
DEPRECATED HCO3 PLAS-SCNC: 23 MMOL/L (ref 22–29)
DEPRECATED HCO3 PLAS-SCNC: 25 MMOL/L (ref 22–29)
DEPRECATED HCO3 PLAS-SCNC: 26 MMOL/L (ref 22–29)
DEPRECATED HCO3 PLAS-SCNC: 27 MMOL/L (ref 22–29)
DEPRECATED HCO3 PLAS-SCNC: 28 MMOL/L (ref 22–29)
DEPRECATED HCO3 PLAS-SCNC: 28 MMOL/L (ref 22–29)
DEPRECATED HCO3 PLAS-SCNC: 29 MMOL/L (ref 22–29)
DIASTOLIC BLOOD PRESSURE - MUSE: NORMAL MMHG
DIASTOLIC BLOOD PRESSURE - MUSE: NORMAL MMHG
ENTEROCOCCUS FAECALIS: NOT DETECTED
ENTEROCOCCUS FAECIUM: NOT DETECTED
EOSINOPHIL # BLD AUTO: 0.1 10E3/UL (ref 0–0.7)
EOSINOPHIL # BLD AUTO: 0.1 10E3/UL (ref 0–0.7)
EOSINOPHIL # BLD AUTO: 0.2 10E3/UL (ref 0–0.7)
EOSINOPHIL NFR BLD AUTO: 1 %
ERYTHROCYTE [DISTWIDTH] IN BLOOD BY AUTOMATED COUNT: 15.1 % (ref 10–15)
ERYTHROCYTE [DISTWIDTH] IN BLOOD BY AUTOMATED COUNT: 15.5 % (ref 10–15)
ERYTHROCYTE [DISTWIDTH] IN BLOOD BY AUTOMATED COUNT: 15.9 % (ref 10–15)
ERYTHROCYTE [DISTWIDTH] IN BLOOD BY AUTOMATED COUNT: 16.2 % (ref 10–15)
ERYTHROCYTE [DISTWIDTH] IN BLOOD BY AUTOMATED COUNT: 16.4 % (ref 10–15)
ERYTHROCYTE [DISTWIDTH] IN BLOOD BY AUTOMATED COUNT: 16.8 % (ref 10–15)
ERYTHROCYTE [DISTWIDTH] IN BLOOD BY AUTOMATED COUNT: 16.9 % (ref 10–15)
ERYTHROCYTE [DISTWIDTH] IN BLOOD BY AUTOMATED COUNT: 16.9 % (ref 10–15)
ERYTHROCYTE [DISTWIDTH] IN BLOOD BY AUTOMATED COUNT: 17 % (ref 10–15)
ERYTHROCYTE [DISTWIDTH] IN BLOOD BY AUTOMATED COUNT: 17.2 % (ref 10–15)
ERYTHROCYTE [DISTWIDTH] IN BLOOD BY AUTOMATED COUNT: 17.5 % (ref 10–15)
ERYTHROCYTE [DISTWIDTH] IN BLOOD BY AUTOMATED COUNT: 17.5 % (ref 10–15)
ERYTHROCYTE [DISTWIDTH] IN BLOOD BY AUTOMATED COUNT: 17.7 % (ref 10–15)
ERYTHROCYTE [DISTWIDTH] IN BLOOD BY AUTOMATED COUNT: 17.9 % (ref 10–15)
FLUAV RNA SPEC QL NAA+PROBE: NEGATIVE
FLUBV RNA RESP QL NAA+PROBE: NEGATIVE
GFR SERPL CREATININE-BSD FRML MDRD: 43 ML/MIN/1.73M2
GFR SERPL CREATININE-BSD FRML MDRD: 43 ML/MIN/1.73M2
GFR SERPL CREATININE-BSD FRML MDRD: 44 ML/MIN/1.73M2
GFR SERPL CREATININE-BSD FRML MDRD: 54 ML/MIN/1.73M2
GFR SERPL CREATININE-BSD FRML MDRD: 64 ML/MIN/1.73M2
GFR SERPL CREATININE-BSD FRML MDRD: 66 ML/MIN/1.73M2
GFR SERPL CREATININE-BSD FRML MDRD: 79 ML/MIN/1.73M2
GFR SERPL CREATININE-BSD FRML MDRD: 79 ML/MIN/1.73M2
GFR SERPL CREATININE-BSD FRML MDRD: 87 ML/MIN/1.73M2
GFR SERPL CREATININE-BSD FRML MDRD: 87 ML/MIN/1.73M2
GFR SERPL CREATININE-BSD FRML MDRD: 88 ML/MIN/1.73M2
GFR SERPL CREATININE-BSD FRML MDRD: 88 ML/MIN/1.73M2
GFR SERPL CREATININE-BSD FRML MDRD: >90 ML/MIN/1.73M2
GLUCOSE BLDC GLUCOMTR-MCNC: 101 MG/DL (ref 70–99)
GLUCOSE BLDC GLUCOMTR-MCNC: 117 MG/DL (ref 70–99)
GLUCOSE BLDC GLUCOMTR-MCNC: 124 MG/DL (ref 70–99)
GLUCOSE BLDC GLUCOMTR-MCNC: 137 MG/DL (ref 70–99)
GLUCOSE BLDC GLUCOMTR-MCNC: 140 MG/DL (ref 70–99)
GLUCOSE BLDC GLUCOMTR-MCNC: 170 MG/DL (ref 70–99)
GLUCOSE BLDC GLUCOMTR-MCNC: 171 MG/DL (ref 70–99)
GLUCOSE BLDC GLUCOMTR-MCNC: 173 MG/DL (ref 70–99)
GLUCOSE BLDC GLUCOMTR-MCNC: 174 MG/DL (ref 70–99)
GLUCOSE BLDC GLUCOMTR-MCNC: 178 MG/DL (ref 70–99)
GLUCOSE BLDC GLUCOMTR-MCNC: 179 MG/DL (ref 70–99)
GLUCOSE BLDC GLUCOMTR-MCNC: 185 MG/DL (ref 70–99)
GLUCOSE BLDC GLUCOMTR-MCNC: 186 MG/DL (ref 70–99)
GLUCOSE BLDC GLUCOMTR-MCNC: 187 MG/DL (ref 70–99)
GLUCOSE BLDC GLUCOMTR-MCNC: 188 MG/DL (ref 70–99)
GLUCOSE BLDC GLUCOMTR-MCNC: 189 MG/DL (ref 70–99)
GLUCOSE BLDC GLUCOMTR-MCNC: 190 MG/DL (ref 70–99)
GLUCOSE BLDC GLUCOMTR-MCNC: 192 MG/DL (ref 70–99)
GLUCOSE BLDC GLUCOMTR-MCNC: 206 MG/DL (ref 70–99)
GLUCOSE BLDC GLUCOMTR-MCNC: 211 MG/DL (ref 70–99)
GLUCOSE BLDC GLUCOMTR-MCNC: 212 MG/DL (ref 70–99)
GLUCOSE BLDC GLUCOMTR-MCNC: 213 MG/DL (ref 70–99)
GLUCOSE BLDC GLUCOMTR-MCNC: 213 MG/DL (ref 70–99)
GLUCOSE BLDC GLUCOMTR-MCNC: 216 MG/DL (ref 70–99)
GLUCOSE BLDC GLUCOMTR-MCNC: 217 MG/DL (ref 70–99)
GLUCOSE BLDC GLUCOMTR-MCNC: 217 MG/DL (ref 70–99)
GLUCOSE BLDC GLUCOMTR-MCNC: 218 MG/DL (ref 70–99)
GLUCOSE BLDC GLUCOMTR-MCNC: 219 MG/DL (ref 70–99)
GLUCOSE BLDC GLUCOMTR-MCNC: 221 MG/DL (ref 70–99)
GLUCOSE BLDC GLUCOMTR-MCNC: 225 MG/DL (ref 70–99)
GLUCOSE BLDC GLUCOMTR-MCNC: 228 MG/DL (ref 70–99)
GLUCOSE BLDC GLUCOMTR-MCNC: 228 MG/DL (ref 70–99)
GLUCOSE BLDC GLUCOMTR-MCNC: 229 MG/DL (ref 70–99)
GLUCOSE BLDC GLUCOMTR-MCNC: 230 MG/DL (ref 70–99)
GLUCOSE BLDC GLUCOMTR-MCNC: 231 MG/DL (ref 70–99)
GLUCOSE BLDC GLUCOMTR-MCNC: 232 MG/DL (ref 70–99)
GLUCOSE BLDC GLUCOMTR-MCNC: 235 MG/DL (ref 70–99)
GLUCOSE BLDC GLUCOMTR-MCNC: 236 MG/DL (ref 70–99)
GLUCOSE BLDC GLUCOMTR-MCNC: 236 MG/DL (ref 70–99)
GLUCOSE BLDC GLUCOMTR-MCNC: 239 MG/DL (ref 70–99)
GLUCOSE BLDC GLUCOMTR-MCNC: 240 MG/DL (ref 70–99)
GLUCOSE BLDC GLUCOMTR-MCNC: 241 MG/DL (ref 70–99)
GLUCOSE BLDC GLUCOMTR-MCNC: 242 MG/DL (ref 70–99)
GLUCOSE BLDC GLUCOMTR-MCNC: 244 MG/DL (ref 70–99)
GLUCOSE BLDC GLUCOMTR-MCNC: 246 MG/DL (ref 70–99)
GLUCOSE BLDC GLUCOMTR-MCNC: 253 MG/DL (ref 70–99)
GLUCOSE BLDC GLUCOMTR-MCNC: 255 MG/DL (ref 70–99)
GLUCOSE BLDC GLUCOMTR-MCNC: 255 MG/DL (ref 70–99)
GLUCOSE BLDC GLUCOMTR-MCNC: 256 MG/DL (ref 70–99)
GLUCOSE BLDC GLUCOMTR-MCNC: 258 MG/DL (ref 70–99)
GLUCOSE BLDC GLUCOMTR-MCNC: 259 MG/DL (ref 70–99)
GLUCOSE BLDC GLUCOMTR-MCNC: 259 MG/DL (ref 70–99)
GLUCOSE BLDC GLUCOMTR-MCNC: 261 MG/DL (ref 70–99)
GLUCOSE BLDC GLUCOMTR-MCNC: 261 MG/DL (ref 70–99)
GLUCOSE BLDC GLUCOMTR-MCNC: 262 MG/DL (ref 70–99)
GLUCOSE BLDC GLUCOMTR-MCNC: 262 MG/DL (ref 70–99)
GLUCOSE BLDC GLUCOMTR-MCNC: 269 MG/DL (ref 70–99)
GLUCOSE BLDC GLUCOMTR-MCNC: 271 MG/DL (ref 70–99)
GLUCOSE BLDC GLUCOMTR-MCNC: 273 MG/DL (ref 70–99)
GLUCOSE BLDC GLUCOMTR-MCNC: 274 MG/DL (ref 70–99)
GLUCOSE BLDC GLUCOMTR-MCNC: 274 MG/DL (ref 70–99)
GLUCOSE BLDC GLUCOMTR-MCNC: 275 MG/DL (ref 70–99)
GLUCOSE BLDC GLUCOMTR-MCNC: 276 MG/DL (ref 70–99)
GLUCOSE BLDC GLUCOMTR-MCNC: 286 MG/DL (ref 70–99)
GLUCOSE BLDC GLUCOMTR-MCNC: 290 MG/DL (ref 70–99)
GLUCOSE BLDC GLUCOMTR-MCNC: 294 MG/DL (ref 70–99)
GLUCOSE BLDC GLUCOMTR-MCNC: 295 MG/DL (ref 70–99)
GLUCOSE BLDC GLUCOMTR-MCNC: 295 MG/DL (ref 70–99)
GLUCOSE BLDC GLUCOMTR-MCNC: 297 MG/DL (ref 70–99)
GLUCOSE BLDC GLUCOMTR-MCNC: 298 MG/DL (ref 70–99)
GLUCOSE BLDC GLUCOMTR-MCNC: 301 MG/DL (ref 70–99)
GLUCOSE BLDC GLUCOMTR-MCNC: 303 MG/DL (ref 70–99)
GLUCOSE BLDC GLUCOMTR-MCNC: 315 MG/DL (ref 70–99)
GLUCOSE BLDC GLUCOMTR-MCNC: 320 MG/DL (ref 70–99)
GLUCOSE BLDC GLUCOMTR-MCNC: 324 MG/DL (ref 70–99)
GLUCOSE BLDC GLUCOMTR-MCNC: 332 MG/DL (ref 70–99)
GLUCOSE BLDC GLUCOMTR-MCNC: 332 MG/DL (ref 70–99)
GLUCOSE BLDC GLUCOMTR-MCNC: 336 MG/DL (ref 70–99)
GLUCOSE BLDC GLUCOMTR-MCNC: 368 MG/DL (ref 70–99)
GLUCOSE BLDC GLUCOMTR-MCNC: 369 MG/DL (ref 70–99)
GLUCOSE BLDC GLUCOMTR-MCNC: 378 MG/DL (ref 70–99)
GLUCOSE BLDC GLUCOMTR-MCNC: 393 MG/DL (ref 70–99)
GLUCOSE BLDC GLUCOMTR-MCNC: 410 MG/DL (ref 70–99)
GLUCOSE BLDC GLUCOMTR-MCNC: 78 MG/DL (ref 70–99)
GLUCOSE BLDC GLUCOMTR-MCNC: 83 MG/DL (ref 70–99)
GLUCOSE BLDC GLUCOMTR-MCNC: 84 MG/DL (ref 70–99)
GLUCOSE BLDC GLUCOMTR-MCNC: 92 MG/DL (ref 70–99)
GLUCOSE BODY FLUID SOURCE: NORMAL
GLUCOSE FLD-MCNC: 110 MG/DL
GLUCOSE SERPL-MCNC: 138 MG/DL (ref 70–99)
GLUCOSE SERPL-MCNC: 153 MG/DL (ref 70–99)
GLUCOSE SERPL-MCNC: 180 MG/DL (ref 70–99)
GLUCOSE SERPL-MCNC: 226 MG/DL (ref 70–99)
GLUCOSE SERPL-MCNC: 238 MG/DL (ref 70–99)
GLUCOSE SERPL-MCNC: 239 MG/DL (ref 70–99)
GLUCOSE SERPL-MCNC: 242 MG/DL (ref 70–99)
GLUCOSE SERPL-MCNC: 243 MG/DL (ref 70–99)
GLUCOSE SERPL-MCNC: 245 MG/DL (ref 70–99)
GLUCOSE SERPL-MCNC: 259 MG/DL (ref 70–99)
GLUCOSE SERPL-MCNC: 260 MG/DL (ref 70–99)
GLUCOSE SERPL-MCNC: 263 MG/DL (ref 70–99)
GLUCOSE SERPL-MCNC: 264 MG/DL (ref 70–99)
GLUCOSE SERPL-MCNC: 271 MG/DL (ref 70–99)
GLUCOSE SERPL-MCNC: 288 MG/DL (ref 70–99)
GLUCOSE SERPL-MCNC: 325 MG/DL (ref 70–99)
GLUCOSE SERPL-MCNC: 346 MG/DL (ref 70–99)
GLUCOSE SERPL-MCNC: 396 MG/DL (ref 70–99)
GLUCOSE UR STRIP-MCNC: >1000 MG/DL
GRAM STAIN RESULT: NORMAL
GRAM STAIN RESULT: NORMAL
HBA1C MFR BLD: 8.2 %
HCO3 BLD-SCNC: 30 MMOL/L (ref 23–29)
HCO3 BLD-SCNC: 30 MMOL/L (ref 23–29)
HCO3 BLDV-SCNC: 20 MMOL/L (ref 24–30)
HCO3 BLDV-SCNC: 30 MMOL/L (ref 24–30)
HCT VFR BLD AUTO: 29.1 % (ref 35–47)
HCT VFR BLD AUTO: 30.7 % (ref 35–47)
HCT VFR BLD AUTO: 32.8 % (ref 35–47)
HCT VFR BLD AUTO: 33.6 % (ref 35–47)
HCT VFR BLD AUTO: 34.5 % (ref 35–47)
HCT VFR BLD AUTO: 34.6 % (ref 35–47)
HCT VFR BLD AUTO: 34.7 % (ref 35–47)
HCT VFR BLD AUTO: 34.8 % (ref 35–47)
HCT VFR BLD AUTO: 35.3 % (ref 35–47)
HCT VFR BLD AUTO: 35.9 % (ref 35–47)
HCT VFR BLD AUTO: 36.3 % (ref 35–47)
HCT VFR BLD AUTO: 36.6 % (ref 35–47)
HCT VFR BLD AUTO: 37 % (ref 35–47)
HCT VFR BLD AUTO: 38.2 % (ref 35–47)
HCT VFR BLD AUTO: 38.5 % (ref 35–47)
HCT VFR BLD AUTO: 38.7 % (ref 35–47)
HCT VFR BLD AUTO: 39.6 % (ref 35–47)
HGB BLD-MCNC: 10.1 G/DL (ref 11.7–15.7)
HGB BLD-MCNC: 10.1 G/DL (ref 11.7–15.7)
HGB BLD-MCNC: 10.2 G/DL (ref 11.7–15.7)
HGB BLD-MCNC: 10.5 G/DL (ref 11.7–15.7)
HGB BLD-MCNC: 10.6 G/DL (ref 11.7–15.7)
HGB BLD-MCNC: 11.3 G/DL (ref 11.7–15.7)
HGB BLD-MCNC: 11.4 G/DL (ref 11.7–15.7)
HGB BLD-MCNC: 12.3 G/DL (ref 11.7–15.7)
HGB BLD-MCNC: 12.4 G/DL (ref 11.7–15.7)
HGB BLD-MCNC: 12.6 G/DL (ref 11.7–15.7)
HGB BLD-MCNC: 13.1 G/DL (ref 11.7–15.7)
HGB BLD-MCNC: 8.5 G/DL (ref 11.7–15.7)
HGB BLD-MCNC: 9.2 G/DL (ref 11.7–15.7)
HGB UR QL STRIP: ABNORMAL
HOLD SPECIMEN: NORMAL
IMM GRANULOCYTES # BLD: 0.1 10E3/UL
IMM GRANULOCYTES NFR BLD: 1 %
INR PPP: 1.69 (ref 0.85–1.15)
INTERPRETATION ECG - MUSE: NORMAL
INTERPRETATION ECG - MUSE: NORMAL
KETONES UR STRIP-MCNC: 20 MG/DL
LACTATE SERPL-SCNC: 1.3 MMOL/L (ref 0.7–2)
LACTATE SERPL-SCNC: 1.4 MMOL/L (ref 0.7–2)
LACTATE SERPL-SCNC: 1.6 MMOL/L (ref 0.7–2)
LACTATE SERPL-SCNC: 1.7 MMOL/L (ref 0.7–2)
LACTATE SERPL-SCNC: 1.7 MMOL/L (ref 0.7–2)
LACTATE SERPL-SCNC: 2 MMOL/L (ref 0.7–2)
LACTATE SERPL-SCNC: 2.5 MMOL/L (ref 0.7–2)
LACTATE SERPL-SCNC: 2.6 MMOL/L (ref 0.7–2)
LACTATE SERPL-SCNC: 3.3 MMOL/L (ref 0.7–2)
LD BODY BODY FLUID SOURCE: NORMAL
LDH FLD L TO P-CCNC: 1868 U/L
LDH SERPL L TO P-CCNC: 189 U/L (ref 0–250)
LEUKOCYTE ESTERASE UR QL STRIP: NEGATIVE
LIPASE SERPL-CCNC: 42 U/L (ref 13–60)
LISTERIA SPECIES (DETECTED/NOT DETECTED): NOT DETECTED
LVEF ECHO: NORMAL
LVEF ECHO: NORMAL
LYMPHOCYTES # BLD AUTO: 0.4 10E3/UL (ref 0.8–5.3)
LYMPHOCYTES # BLD AUTO: 0.9 10E3/UL (ref 0.8–5.3)
LYMPHOCYTES # BLD AUTO: 1.4 10E3/UL (ref 0.8–5.3)
LYMPHOCYTES NFR BLD AUTO: 10 %
LYMPHOCYTES NFR BLD AUTO: 3 %
LYMPHOCYTES NFR BLD AUTO: 7 %
MAGNESIUM SERPL-MCNC: 2.6 MG/DL (ref 1.7–2.3)
MCH RBC QN AUTO: 28.9 PG (ref 26.5–33)
MCH RBC QN AUTO: 29 PG (ref 26.5–33)
MCH RBC QN AUTO: 29.1 PG (ref 26.5–33)
MCH RBC QN AUTO: 29.3 PG (ref 26.5–33)
MCH RBC QN AUTO: 29.4 PG (ref 26.5–33)
MCH RBC QN AUTO: 29.6 PG (ref 26.5–33)
MCH RBC QN AUTO: 29.6 PG (ref 26.5–33)
MCH RBC QN AUTO: 29.7 PG (ref 26.5–33)
MCH RBC QN AUTO: 29.8 PG (ref 26.5–33)
MCH RBC QN AUTO: 29.8 PG (ref 26.5–33)
MCHC RBC AUTO-ENTMCNC: 29.2 G/DL (ref 31.5–36.5)
MCHC RBC AUTO-ENTMCNC: 29.4 G/DL (ref 31.5–36.5)
MCHC RBC AUTO-ENTMCNC: 30 G/DL (ref 31.5–36.5)
MCHC RBC AUTO-ENTMCNC: 30 G/DL (ref 31.5–36.5)
MCHC RBC AUTO-ENTMCNC: 30.1 G/DL (ref 31.5–36.5)
MCHC RBC AUTO-ENTMCNC: 30.2 G/DL (ref 31.5–36.5)
MCHC RBC AUTO-ENTMCNC: 30.3 G/DL (ref 31.5–36.5)
MCHC RBC AUTO-ENTMCNC: 30.4 G/DL (ref 31.5–36.5)
MCHC RBC AUTO-ENTMCNC: 30.8 G/DL (ref 31.5–36.5)
MCHC RBC AUTO-ENTMCNC: 31.4 G/DL (ref 31.5–36.5)
MCHC RBC AUTO-ENTMCNC: 31.5 G/DL (ref 31.5–36.5)
MCHC RBC AUTO-ENTMCNC: 31.8 G/DL (ref 31.5–36.5)
MCHC RBC AUTO-ENTMCNC: 31.9 G/DL (ref 31.5–36.5)
MCHC RBC AUTO-ENTMCNC: 32.5 G/DL (ref 31.5–36.5)
MCHC RBC AUTO-ENTMCNC: 33.2 G/DL (ref 31.5–36.5)
MCHC RBC AUTO-ENTMCNC: 33.6 G/DL (ref 31.5–36.5)
MCHC RBC AUTO-ENTMCNC: 33.9 G/DL (ref 31.5–36.5)
MCV RBC AUTO: 100 FL (ref 78–100)
MCV RBC AUTO: 88 FL (ref 78–100)
MCV RBC AUTO: 89 FL (ref 78–100)
MCV RBC AUTO: 89 FL (ref 78–100)
MCV RBC AUTO: 91 FL (ref 78–100)
MCV RBC AUTO: 92 FL (ref 78–100)
MCV RBC AUTO: 93 FL (ref 78–100)
MCV RBC AUTO: 96 FL (ref 78–100)
MCV RBC AUTO: 96 FL (ref 78–100)
MCV RBC AUTO: 97 FL (ref 78–100)
MCV RBC AUTO: 97 FL (ref 78–100)
MCV RBC AUTO: 98 FL (ref 78–100)
MCV RBC AUTO: 99 FL (ref 78–100)
MONOCYTES # BLD AUTO: 0.7 10E3/UL (ref 0–1.3)
MONOCYTES # BLD AUTO: 0.7 10E3/UL (ref 0–1.3)
MONOCYTES # BLD AUTO: 1 10E3/UL (ref 0–1.3)
MONOCYTES NFR BLD AUTO: 5 %
MONOCYTES NFR BLD AUTO: 5 %
MONOCYTES NFR BLD AUTO: 7 %
NEUTROPHILS # BLD AUTO: 10.6 10E3/UL (ref 1.6–8.3)
NEUTROPHILS # BLD AUTO: 11.5 10E3/UL (ref 1.6–8.3)
NEUTROPHILS # BLD AUTO: 11.8 10E3/UL (ref 1.6–8.3)
NEUTROPHILS NFR BLD AUTO: 83 %
NEUTROPHILS NFR BLD AUTO: 86 %
NEUTROPHILS NFR BLD AUTO: 88 %
NITRATE UR QL: NEGATIVE
NRBC # BLD AUTO: 0 10E3/UL
NRBC BLD AUTO-RTO: 0 /100
OSMOLALITY UR: 699 MMOL/KG (ref 100–1200)
OXYHGB MFR BLD: 95.5 % (ref 95–96)
OXYHGB MFR BLD: 97.8 % (ref 95–96)
OXYHGB MFR BLDV: 73.8 % (ref 70–75)
OXYHGB MFR BLDV: 89.6 % (ref 70–75)
P AXIS - MUSE: 27 DEGREES
P AXIS - MUSE: 63 DEGREES
PATH REPORT.COMMENTS IMP SPEC: NORMAL
PATH REPORT.FINAL DX SPEC: NORMAL
PATH REPORT.GROSS SPEC: NORMAL
PATH REPORT.MICROSCOPIC SPEC OTHER STN: NORMAL
PATH REPORT.RELEVANT HX SPEC: NORMAL
PCO2 BLD: 52 MM HG (ref 35–45)
PCO2 BLD: 53 MM HG (ref 35–45)
PCO2 BLDV: 32 MM HG (ref 35–50)
PCO2 BLDV: 51 MM HG (ref 35–50)
PH BLD: 7.36 [PH] (ref 7.37–7.44)
PH BLD: 7.37 [PH] (ref 7.37–7.44)
PH BLDV: 7.38 [PH] (ref 7.35–7.45)
PH BLDV: 7.42 [PH] (ref 7.35–7.45)
PH BODY FLUID SOURCE: NORMAL
PH FLD: 8 PH
PH UR STRIP: 5.5 [PH] (ref 5–7)
PHOSPHATE SERPL-MCNC: 3.1 MG/DL (ref 2.5–4.5)
PLATELET # BLD AUTO: 105 10E3/UL (ref 150–450)
PLATELET # BLD AUTO: 111 10E3/UL (ref 150–450)
PLATELET # BLD AUTO: 112 10E3/UL (ref 150–450)
PLATELET # BLD AUTO: 114 10E3/UL (ref 150–450)
PLATELET # BLD AUTO: 122 10E3/UL (ref 150–450)
PLATELET # BLD AUTO: 126 10E3/UL (ref 150–450)
PLATELET # BLD AUTO: 135 10E3/UL (ref 150–450)
PLATELET # BLD AUTO: 142 10E3/UL (ref 150–450)
PLATELET # BLD AUTO: 144 10E3/UL (ref 150–450)
PLATELET # BLD AUTO: 151 10E3/UL (ref 150–450)
PLATELET # BLD AUTO: 169 10E3/UL (ref 150–450)
PLATELET # BLD AUTO: 172 10E3/UL (ref 150–450)
PLATELET # BLD AUTO: 197 10E3/UL (ref 150–450)
PLATELET # BLD AUTO: 199 10E3/UL (ref 150–450)
PLATELET # BLD AUTO: 207 10E3/UL (ref 150–450)
PLATELET # BLD AUTO: 217 10E3/UL (ref 150–450)
PLATELET # BLD AUTO: 257 10E3/UL (ref 150–450)
PO2 BLD: 134 MM HG (ref 75–85)
PO2 BLD: 84 MM HG (ref 75–85)
PO2 BLDV: 40 MM HG (ref 25–47)
PO2 BLDV: 56 MM HG (ref 25–47)
POTASSIUM SERPL-SCNC: 3.1 MMOL/L (ref 3.4–5.3)
POTASSIUM SERPL-SCNC: 3.2 MMOL/L (ref 3.4–5.3)
POTASSIUM SERPL-SCNC: 3.2 MMOL/L (ref 3.4–5.3)
POTASSIUM SERPL-SCNC: 3.3 MMOL/L (ref 3.4–5.3)
POTASSIUM SERPL-SCNC: 3.4 MMOL/L (ref 3.4–5.3)
POTASSIUM SERPL-SCNC: 3.5 MMOL/L (ref 3.4–5.3)
POTASSIUM SERPL-SCNC: 3.6 MMOL/L (ref 3.4–5.3)
POTASSIUM SERPL-SCNC: 3.7 MMOL/L (ref 3.4–5.3)
POTASSIUM SERPL-SCNC: 3.9 MMOL/L (ref 3.4–5.3)
POTASSIUM SERPL-SCNC: 4 MMOL/L (ref 3.4–5.3)
POTASSIUM SERPL-SCNC: 4 MMOL/L (ref 3.4–5.3)
POTASSIUM SERPL-SCNC: 4.1 MMOL/L (ref 3.4–5.3)
POTASSIUM SERPL-SCNC: 4.2 MMOL/L (ref 3.4–5.3)
POTASSIUM SERPL-SCNC: 4.3 MMOL/L (ref 3.4–5.3)
POTASSIUM SERPL-SCNC: 4.4 MMOL/L (ref 3.4–5.3)
POTASSIUM SERPL-SCNC: 4.5 MMOL/L (ref 3.4–5.3)
POTASSIUM SERPL-SCNC: 4.6 MMOL/L (ref 3.4–5.3)
POTASSIUM SERPL-SCNC: 4.7 MMOL/L (ref 3.4–5.3)
PR INTERVAL - MUSE: 148 MS
PR INTERVAL - MUSE: 158 MS
PROT FLD-MCNC: 3.2 G/DL
PROT SERPL-MCNC: 6.3 G/DL (ref 6.4–8.3)
PROT SERPL-MCNC: 6.5 G/DL (ref 6.4–8.3)
PROT SERPL-MCNC: 6.6 G/DL (ref 6.4–8.3)
PROT SERPL-MCNC: 6.7 G/DL (ref 6.4–8.3)
PROT SERPL-MCNC: 6.8 G/DL (ref 6.4–8.3)
PROT SERPL-MCNC: 7 G/DL (ref 6.4–8.3)
PROT SERPL-MCNC: 7.1 G/DL (ref 6.4–8.3)
PROT SERPL-MCNC: 7.2 G/DL (ref 6.4–8.3)
PROT SERPL-MCNC: 7.2 G/DL (ref 6.4–8.3)
PROT SERPL-MCNC: 7.3 G/DL (ref 6.4–8.3)
PROTEIN BODY FLUID SOURCE: NORMAL
QRS DURATION - MUSE: 114 MS
QRS DURATION - MUSE: 94 MS
QT - MUSE: 398 MS
QT - MUSE: 404 MS
QTC - MUSE: 454 MS
QTC - MUSE: 581 MS
R AXIS - MUSE: -6 DEGREES
R AXIS - MUSE: 15 DEGREES
RBC # BLD AUTO: 2.9 10E6/UL (ref 3.8–5.2)
RBC # BLD AUTO: 3.14 10E6/UL (ref 3.8–5.2)
RBC # BLD AUTO: 3.39 10E6/UL (ref 3.8–5.2)
RBC # BLD AUTO: 3.48 10E6/UL (ref 3.8–5.2)
RBC # BLD AUTO: 3.52 10E6/UL (ref 3.8–5.2)
RBC # BLD AUTO: 3.55 10E6/UL (ref 3.8–5.2)
RBC # BLD AUTO: 3.61 10E6/UL (ref 3.8–5.2)
RBC # BLD AUTO: 3.62 10E6/UL (ref 3.8–5.2)
RBC # BLD AUTO: 3.63 10E6/UL (ref 3.8–5.2)
RBC # BLD AUTO: 3.89 10E6/UL (ref 3.8–5.2)
RBC # BLD AUTO: 3.92 10E6/UL (ref 3.8–5.2)
RBC # BLD AUTO: 4.13 10E6/UL (ref 3.8–5.2)
RBC # BLD AUTO: 4.16 10E6/UL (ref 3.8–5.2)
RBC # BLD AUTO: 4.22 10E6/UL (ref 3.8–5.2)
RBC # BLD AUTO: 4.22 10E6/UL (ref 3.8–5.2)
RBC # BLD AUTO: 4.34 10E6/UL (ref 3.8–5.2)
RBC # BLD AUTO: 4.41 10E6/UL (ref 3.8–5.2)
RBC URINE: <1 /HPF
RSV RNA SPEC NAA+PROBE: NEGATIVE
SAO2 % BLDV: 75.8 % (ref 70–75)
SAO2 % BLDV: 91.2 % (ref 70–75)
SARS-COV-2 RNA RESP QL NAA+PROBE: NEGATIVE
SODIUM SERPL-SCNC: 136 MMOL/L (ref 136–145)
SODIUM SERPL-SCNC: 138 MMOL/L (ref 136–145)
SODIUM SERPL-SCNC: 138 MMOL/L (ref 136–145)
SODIUM SERPL-SCNC: 141 MMOL/L (ref 136–145)
SODIUM SERPL-SCNC: 146 MMOL/L (ref 136–145)
SODIUM SERPL-SCNC: 147 MMOL/L (ref 136–145)
SODIUM SERPL-SCNC: 149 MMOL/L (ref 136–145)
SODIUM SERPL-SCNC: 150 MMOL/L (ref 136–145)
SODIUM SERPL-SCNC: 151 MMOL/L (ref 136–145)
SODIUM SERPL-SCNC: 151 MMOL/L (ref 136–145)
SODIUM SERPL-SCNC: 152 MMOL/L (ref 136–145)
SODIUM SERPL-SCNC: 153 MMOL/L (ref 136–145)
SODIUM SERPL-SCNC: 153 MMOL/L (ref 136–145)
SODIUM SERPL-SCNC: 155 MMOL/L (ref 136–145)
SP GR UR STRIP: 1.03 (ref 1–1.03)
STAPHYLOCOCCUS AUREUS: DETECTED
STAPHYLOCOCCUS EPIDERMIDIS: NOT DETECTED
STAPHYLOCOCCUS LUGDUNENSIS: NOT DETECTED
STREPTOCOCCUS AGALACTIAE: NOT DETECTED
STREPTOCOCCUS ANGINOSUS GROUP: NOT DETECTED
STREPTOCOCCUS PNEUMONIAE: NOT DETECTED
STREPTOCOCCUS PYOGENES: NOT DETECTED
STREPTOCOCCUS SPECIES: NOT DETECTED
SYSTOLIC BLOOD PRESSURE - MUSE: NORMAL MMHG
SYSTOLIC BLOOD PRESSURE - MUSE: NORMAL MMHG
T AXIS - MUSE: 15 DEGREES
T AXIS - MUSE: 71 DEGREES
TEMPERATURE: 37 DEGREES C
TEMPERATURE: 37 DEGREES C
TROPONIN T SERPL HS-MCNC: 35 NG/L
TROPONIN T SERPL HS-MCNC: 37 NG/L
UROBILINOGEN UR STRIP-MCNC: <2 MG/DL
VENTRICULAR RATE- MUSE: 128 BPM
VENTRICULAR RATE- MUSE: 76 BPM
WBC # BLD AUTO: 10.4 10E3/UL (ref 4–11)
WBC # BLD AUTO: 11.9 10E3/UL (ref 4–11)
WBC # BLD AUTO: 12.3 10E3/UL (ref 4–11)
WBC # BLD AUTO: 13.2 10E3/UL (ref 4–11)
WBC # BLD AUTO: 14.2 10E3/UL (ref 4–11)
WBC # BLD AUTO: 16.6 10E3/UL (ref 4–11)
WBC # BLD AUTO: 6.6 10E3/UL (ref 4–11)
WBC # BLD AUTO: 7 10E3/UL (ref 4–11)
WBC # BLD AUTO: 7.4 10E3/UL (ref 4–11)
WBC # BLD AUTO: 7.5 10E3/UL (ref 4–11)
WBC # BLD AUTO: 8.9 10E3/UL (ref 4–11)
WBC # BLD AUTO: 8.9 10E3/UL (ref 4–11)
WBC # BLD AUTO: 9 10E3/UL (ref 4–11)
WBC # BLD AUTO: 9.1 10E3/UL (ref 4–11)
WBC # BLD AUTO: 9.4 10E3/UL (ref 4–11)
WBC # BLD AUTO: 9.4 10E3/UL (ref 4–11)
WBC # BLD AUTO: 9.9 10E3/UL (ref 4–11)
WBC # FLD AUTO: 171 /UL
WBC URINE: 1 /HPF

## 2023-01-01 PROCEDURE — 99231 SBSQ HOSP IP/OBS SF/LOW 25: CPT | Mod: GC

## 2023-01-01 PROCEDURE — 85027 COMPLETE CBC AUTOMATED: CPT

## 2023-01-01 PROCEDURE — 87206 SMEAR FLUORESCENT/ACID STAI: CPT | Performed by: INTERNAL MEDICINE

## 2023-01-01 PROCEDURE — 82945 GLUCOSE OTHER FLUID: CPT

## 2023-01-01 PROCEDURE — 250N000009 HC RX 250: Performed by: INTERNAL MEDICINE

## 2023-01-01 PROCEDURE — 250N000013 HC RX MED GY IP 250 OP 250 PS 637

## 2023-01-01 PROCEDURE — 99291 CRITICAL CARE FIRST HOUR: CPT | Performed by: NURSE PRACTITIONER

## 2023-01-01 PROCEDURE — 250N000011 HC RX IP 250 OP 636: Mod: JZ

## 2023-01-01 PROCEDURE — 120N000001 HC R&B MED SURG/OB

## 2023-01-01 PROCEDURE — 258N000003 HC RX IP 258 OP 636: Performed by: FAMILY MEDICINE

## 2023-01-01 PROCEDURE — 250N000013 HC RX MED GY IP 250 OP 250 PS 637: Performed by: FAMILY MEDICINE

## 2023-01-01 PROCEDURE — 250N000013 HC RX MED GY IP 250 OP 250 PS 637: Performed by: INTERNAL MEDICINE

## 2023-01-01 PROCEDURE — 36415 COLL VENOUS BLD VENIPUNCTURE: CPT

## 2023-01-01 PROCEDURE — 250N000011 HC RX IP 250 OP 636: Mod: JZ | Performed by: FAMILY MEDICINE

## 2023-01-01 PROCEDURE — 93005 ELECTROCARDIOGRAM TRACING: CPT

## 2023-01-01 PROCEDURE — 93005 ELECTROCARDIOGRAM TRACING: CPT | Performed by: INTERNAL MEDICINE

## 2023-01-01 PROCEDURE — 200N000001 HC R&B ICU

## 2023-01-01 PROCEDURE — 250N000012 HC RX MED GY IP 250 OP 636 PS 637

## 2023-01-01 PROCEDURE — 999N000127 HC STATISTIC PERIPHERAL IV START W US GUIDANCE

## 2023-01-01 PROCEDURE — 83605 ASSAY OF LACTIC ACID: CPT | Performed by: EMERGENCY MEDICINE

## 2023-01-01 PROCEDURE — 71045 X-RAY EXAM CHEST 1 VIEW: CPT

## 2023-01-01 PROCEDURE — 80053 COMPREHEN METABOLIC PANEL: CPT

## 2023-01-01 PROCEDURE — 84295 ASSAY OF SERUM SODIUM: CPT

## 2023-01-01 PROCEDURE — 250N000012 HC RX MED GY IP 250 OP 636 PS 637: Performed by: INTERNAL MEDICINE

## 2023-01-01 PROCEDURE — 93325 DOPPLER ECHO COLOR FLOW MAPG: CPT

## 2023-01-01 PROCEDURE — 258N000003 HC RX IP 258 OP 636: Performed by: INTERNAL MEDICINE

## 2023-01-01 PROCEDURE — 83605 ASSAY OF LACTIC ACID: CPT

## 2023-01-01 PROCEDURE — 71250 CT THORAX DX C-: CPT

## 2023-01-01 PROCEDURE — 32557 INSERT CATH PLEURA W/ IMAGE: CPT

## 2023-01-01 PROCEDURE — 99232 SBSQ HOSP IP/OBS MODERATE 35: CPT | Mod: GC

## 2023-01-01 PROCEDURE — 83615 LACTATE (LD) (LDH) ENZYME: CPT

## 2023-01-01 PROCEDURE — 0W9930Z DRAINAGE OF RIGHT PLEURAL CAVITY WITH DRAINAGE DEVICE, PERCUTANEOUS APPROACH: ICD-10-PCS | Performed by: RADIOLOGY

## 2023-01-01 PROCEDURE — 99291 CRITICAL CARE FIRST HOUR: CPT | Performed by: INTERNAL MEDICINE

## 2023-01-01 PROCEDURE — C1729 CATH, DRAINAGE: HCPCS

## 2023-01-01 PROCEDURE — 99232 SBSQ HOSP IP/OBS MODERATE 35: CPT | Performed by: INTERNAL MEDICINE

## 2023-01-01 PROCEDURE — 87077 CULTURE AEROBIC IDENTIFY: CPT

## 2023-01-01 PROCEDURE — 250N000011 HC RX IP 250 OP 636: Mod: JZ | Performed by: INTERNAL MEDICINE

## 2023-01-01 PROCEDURE — 36569 INSJ PICC 5 YR+ W/O IMAGING: CPT

## 2023-01-01 PROCEDURE — 999N000065 XR CHEST 1 VIEW

## 2023-01-01 PROCEDURE — 99207 PR NO CHARGE LOS: CPT | Performed by: FAMILY MEDICINE

## 2023-01-01 PROCEDURE — 250N000013 HC RX MED GY IP 250 OP 250 PS 637: Performed by: STUDENT IN AN ORGANIZED HEALTH CARE EDUCATION/TRAINING PROGRAM

## 2023-01-01 PROCEDURE — 999N000287 HC ICU ADULT ROUNDING, EACH 10 MINS

## 2023-01-01 PROCEDURE — 36415 COLL VENOUS BLD VENIPUNCTURE: CPT | Performed by: FAMILY MEDICINE

## 2023-01-01 PROCEDURE — 83986 ASSAY PH BODY FLUID NOS: CPT | Performed by: INTERNAL MEDICINE

## 2023-01-01 PROCEDURE — 83605 ASSAY OF LACTIC ACID: CPT | Performed by: FAMILY MEDICINE

## 2023-01-01 PROCEDURE — P9045 ALBUMIN (HUMAN), 5%, 250 ML: HCPCS | Mod: JZ | Performed by: INTERNAL MEDICINE

## 2023-01-01 PROCEDURE — 210N000001 HC R&B IMCU HEART CARE

## 2023-01-01 PROCEDURE — 250N000011 HC RX IP 250 OP 636: Performed by: INTERNAL MEDICINE

## 2023-01-01 PROCEDURE — 72128 CT CHEST SPINE W/O DYE: CPT

## 2023-01-01 PROCEDURE — 84295 ASSAY OF SERUM SODIUM: CPT | Performed by: NURSE PRACTITIONER

## 2023-01-01 PROCEDURE — 82805 BLOOD GASES W/O2 SATURATION: CPT | Performed by: INTERNAL MEDICINE

## 2023-01-01 PROCEDURE — 93010 ELECTROCARDIOGRAM REPORT: CPT | Performed by: GENERAL ACUTE CARE HOSPITAL

## 2023-01-01 PROCEDURE — 3E043XZ INTRODUCTION OF VASOPRESSOR INTO CENTRAL VEIN, PERCUTANEOUS APPROACH: ICD-10-PCS | Performed by: INTERNAL MEDICINE

## 2023-01-01 PROCEDURE — 999N000157 HC STATISTIC RCP TIME EA 10 MIN

## 2023-01-01 PROCEDURE — 32555 ASPIRATE PLEURA W/ IMAGING: CPT

## 2023-01-01 PROCEDURE — 272N000452 HC KIT SHRLOCK 5FR POWER PICC TRIPLE LUMEN

## 2023-01-01 PROCEDURE — 93306 TTE W/DOPPLER COMPLETE: CPT | Mod: 26 | Performed by: INTERNAL MEDICINE

## 2023-01-01 PROCEDURE — 36415 COLL VENOUS BLD VENIPUNCTURE: CPT | Performed by: EMERGENCY MEDICINE

## 2023-01-01 PROCEDURE — 999N000065 XR ABDOMEN PORT 1 VIEW

## 2023-01-01 PROCEDURE — 85025 COMPLETE CBC W/AUTO DIFF WBC: CPT | Performed by: INTERNAL MEDICINE

## 2023-01-01 PROCEDURE — 93312 ECHO TRANSESOPHAGEAL: CPT | Mod: 26 | Performed by: INTERNAL MEDICINE

## 2023-01-01 PROCEDURE — 99291 CRITICAL CARE FIRST HOUR: CPT | Performed by: STUDENT IN AN ORGANIZED HEALTH CARE EDUCATION/TRAINING PROGRAM

## 2023-01-01 PROCEDURE — 255N000002 HC RX 255 OP 636: Mod: JZ | Performed by: FAMILY MEDICINE

## 2023-01-01 PROCEDURE — 84132 ASSAY OF SERUM POTASSIUM: CPT

## 2023-01-01 PROCEDURE — 85025 COMPLETE CBC W/AUTO DIFF WBC: CPT | Performed by: EMERGENCY MEDICINE

## 2023-01-01 PROCEDURE — 87040 BLOOD CULTURE FOR BACTERIA: CPT | Performed by: FAMILY MEDICINE

## 2023-01-01 PROCEDURE — 74177 CT ABD & PELVIS W/CONTRAST: CPT

## 2023-01-01 PROCEDURE — 250N000011 HC RX IP 250 OP 636

## 2023-01-01 PROCEDURE — 87102 FUNGUS ISOLATION CULTURE: CPT | Performed by: INTERNAL MEDICINE

## 2023-01-01 PROCEDURE — 272N000196 HC ACCESSORY CR5

## 2023-01-01 PROCEDURE — 80053 COMPREHEN METABOLIC PANEL: CPT | Performed by: FAMILY MEDICINE

## 2023-01-01 PROCEDURE — 72080 X-RAY EXAM THORACOLMB 2/> VW: CPT

## 2023-01-01 PROCEDURE — 99222 1ST HOSP IP/OBS MODERATE 55: CPT | Mod: GC | Performed by: FAMILY MEDICINE

## 2023-01-01 PROCEDURE — 258N000003 HC RX IP 258 OP 636

## 2023-01-01 PROCEDURE — 99233 SBSQ HOSP IP/OBS HIGH 50: CPT | Performed by: INTERNAL MEDICINE

## 2023-01-01 PROCEDURE — 83935 ASSAY OF URINE OSMOLALITY: CPT

## 2023-01-01 PROCEDURE — 999N000147 HC STATISTIC PT IP EVAL DEFER

## 2023-01-01 PROCEDURE — 87040 BLOOD CULTURE FOR BACTERIA: CPT

## 2023-01-01 PROCEDURE — 93312 ECHO TRANSESOPHAGEAL: CPT

## 2023-01-01 PROCEDURE — 99233 SBSQ HOSP IP/OBS HIGH 50: CPT | Mod: GC

## 2023-01-01 PROCEDURE — 99231 SBSQ HOSP IP/OBS SF/LOW 25: CPT | Performed by: INTERNAL MEDICINE

## 2023-01-01 PROCEDURE — 72131 CT LUMBAR SPINE W/O DYE: CPT

## 2023-01-01 PROCEDURE — 87116 MYCOBACTERIA CULTURE: CPT | Performed by: INTERNAL MEDICINE

## 2023-01-01 PROCEDURE — 94640 AIRWAY INHALATION TREATMENT: CPT | Mod: 76

## 2023-01-01 PROCEDURE — 250N000009 HC RX 250: Performed by: NURSE PRACTITIONER

## 2023-01-01 PROCEDURE — 85027 COMPLETE CBC AUTOMATED: CPT | Performed by: FAMILY MEDICINE

## 2023-01-01 PROCEDURE — 85610 PROTHROMBIN TIME: CPT

## 2023-01-01 PROCEDURE — 82962 GLUCOSE BLOOD TEST: CPT

## 2023-01-01 PROCEDURE — 99222 1ST HOSP IP/OBS MODERATE 55: CPT | Performed by: INTERNAL MEDICINE

## 2023-01-01 PROCEDURE — 250N000011 HC RX IP 250 OP 636: Performed by: STUDENT IN AN ORGANIZED HEALTH CARE EDUCATION/TRAINING PROGRAM

## 2023-01-01 PROCEDURE — 88305 TISSUE EXAM BY PATHOLOGIST: CPT | Mod: 26 | Performed by: PATHOLOGY

## 2023-01-01 PROCEDURE — 93005 ELECTROCARDIOGRAM TRACING: CPT | Performed by: EMERGENCY MEDICINE

## 2023-01-01 PROCEDURE — 85014 HEMATOCRIT: CPT

## 2023-01-01 PROCEDURE — 83036 HEMOGLOBIN GLYCOSYLATED A1C: CPT

## 2023-01-01 PROCEDURE — 96361 HYDRATE IV INFUSION ADD-ON: CPT

## 2023-01-01 PROCEDURE — 82140 ASSAY OF AMMONIA: CPT | Performed by: INTERNAL MEDICINE

## 2023-01-01 PROCEDURE — 93306 TTE W/DOPPLER COMPLETE: CPT

## 2023-01-01 PROCEDURE — 82565 ASSAY OF CREATININE: CPT

## 2023-01-01 PROCEDURE — 99207 PR APP CREDIT; MD BILLING SHARED VISIT: CPT | Performed by: INTERNAL MEDICINE

## 2023-01-01 PROCEDURE — 272N000450 HC KIT 4FR POWER PICC SINGLE LUMEN

## 2023-01-01 PROCEDURE — 82010 KETONE BODYS QUAN: CPT | Performed by: EMERGENCY MEDICINE

## 2023-01-01 PROCEDURE — 36569 INSJ PICC 5 YR+ W/O IMAGING: CPT | Mod: 52

## 2023-01-01 PROCEDURE — 99291 CRITICAL CARE FIRST HOUR: CPT | Mod: 25

## 2023-01-01 PROCEDURE — 94640 AIRWAY INHALATION TREATMENT: CPT

## 2023-01-01 PROCEDURE — U0003 INFECTIOUS AGENT DETECTION BY NUCLEIC ACID (DNA OR RNA); SEVERE ACUTE RESPIRATORY SYNDROME CORONAVIRUS 2 (SARS-COV-2) (CORONAVIRUS DISEASE [COVID-19]), AMPLIFIED PROBE TECHNIQUE, MAKING USE OF HIGH THROUGHPUT TECHNOLOGIES AS DESCRIBED BY CMS-2020-01-R: HCPCS | Mod: ORL | Performed by: FAMILY MEDICINE

## 2023-01-01 PROCEDURE — 94660 CPAP INITIATION&MGMT: CPT

## 2023-01-01 PROCEDURE — 82140 ASSAY OF AMMONIA: CPT | Performed by: NURSE PRACTITIONER

## 2023-01-01 PROCEDURE — 250N000009 HC RX 250: Performed by: STUDENT IN AN ORGANIZED HEALTH CARE EDUCATION/TRAINING PROGRAM

## 2023-01-01 PROCEDURE — 84100 ASSAY OF PHOSPHORUS: CPT | Performed by: INTERNAL MEDICINE

## 2023-01-01 PROCEDURE — 85025 COMPLETE CBC W/AUTO DIFF WBC: CPT

## 2023-01-01 PROCEDURE — 81001 URINALYSIS AUTO W/SCOPE: CPT | Performed by: EMERGENCY MEDICINE

## 2023-01-01 PROCEDURE — 83690 ASSAY OF LIPASE: CPT

## 2023-01-01 PROCEDURE — 93325 DOPPLER ECHO COLOR FLOW MAPG: CPT | Mod: 26 | Performed by: INTERNAL MEDICINE

## 2023-01-01 PROCEDURE — 96365 THER/PROPH/DIAG IV INF INIT: CPT | Mod: 59

## 2023-01-01 PROCEDURE — 99232 SBSQ HOSP IP/OBS MODERATE 35: CPT | Mod: GC | Performed by: FAMILY MEDICINE

## 2023-01-01 PROCEDURE — 80048 BASIC METABOLIC PNL TOTAL CA: CPT

## 2023-01-01 PROCEDURE — 93320 DOPPLER ECHO COMPLETE: CPT | Mod: 26 | Performed by: INTERNAL MEDICINE

## 2023-01-01 PROCEDURE — 87637 SARSCOV2&INF A&B&RSV AMP PRB: CPT

## 2023-01-01 PROCEDURE — 84484 ASSAY OF TROPONIN QUANT: CPT | Performed by: EMERGENCY MEDICINE

## 2023-01-01 PROCEDURE — 72158 MRI LUMBAR SPINE W/O & W/DYE: CPT

## 2023-01-01 PROCEDURE — 99153 MOD SED SAME PHYS/QHP EA: CPT | Performed by: INTERNAL MEDICINE

## 2023-01-01 PROCEDURE — C1769 GUIDE WIRE: HCPCS

## 2023-01-01 PROCEDURE — 82533 TOTAL CORTISOL: CPT | Performed by: INTERNAL MEDICINE

## 2023-01-01 PROCEDURE — 272N000710 US THORACENTESIS

## 2023-01-01 PROCEDURE — 83605 ASSAY OF LACTIC ACID: CPT | Performed by: STUDENT IN AN ORGANIZED HEALTH CARE EDUCATION/TRAINING PROGRAM

## 2023-01-01 PROCEDURE — 84132 ASSAY OF SERUM POTASSIUM: CPT | Performed by: FAMILY MEDICINE

## 2023-01-01 PROCEDURE — 87205 SMEAR GRAM STAIN: CPT

## 2023-01-01 PROCEDURE — 87070 CULTURE OTHR SPECIMN AEROBIC: CPT

## 2023-01-01 PROCEDURE — 84157 ASSAY OF PROTEIN OTHER: CPT

## 2023-01-01 PROCEDURE — 99223 1ST HOSP IP/OBS HIGH 75: CPT | Performed by: FAMILY MEDICINE

## 2023-01-01 PROCEDURE — 258N000003 HC RX IP 258 OP 636: Performed by: EMERGENCY MEDICINE

## 2023-01-01 PROCEDURE — 0W993ZZ DRAINAGE OF RIGHT PLEURAL CAVITY, PERCUTANEOUS APPROACH: ICD-10-PCS | Performed by: RADIOLOGY

## 2023-01-01 PROCEDURE — 36600 WITHDRAWAL OF ARTERIAL BLOOD: CPT

## 2023-01-01 PROCEDURE — 250N000011 HC RX IP 250 OP 636: Performed by: NURSE PRACTITIONER

## 2023-01-01 PROCEDURE — 89050 BODY FLUID CELL COUNT: CPT

## 2023-01-01 PROCEDURE — 87149 DNA/RNA DIRECT PROBE: CPT | Performed by: EMERGENCY MEDICINE

## 2023-01-01 PROCEDURE — A9585 GADOBUTROL INJECTION: HCPCS | Mod: JZ | Performed by: FAMILY MEDICINE

## 2023-01-01 PROCEDURE — 99152 MOD SED SAME PHYS/QHP 5/>YRS: CPT | Performed by: INTERNAL MEDICINE

## 2023-01-01 PROCEDURE — 70450 CT HEAD/BRAIN W/O DYE: CPT

## 2023-01-01 PROCEDURE — 82248 BILIRUBIN DIRECT: CPT

## 2023-01-01 PROCEDURE — 250N000011 HC RX IP 250 OP 636: Performed by: EMERGENCY MEDICINE

## 2023-01-01 PROCEDURE — 82805 BLOOD GASES W/O2 SATURATION: CPT | Performed by: EMERGENCY MEDICINE

## 2023-01-01 PROCEDURE — 36415 COLL VENOUS BLD VENIPUNCTURE: CPT | Performed by: STUDENT IN AN ORGANIZED HEALTH CARE EDUCATION/TRAINING PROGRAM

## 2023-01-01 PROCEDURE — 93970 EXTREMITY STUDY: CPT

## 2023-01-01 PROCEDURE — 250N000011 HC RX IP 250 OP 636: Mod: JZ | Performed by: STUDENT IN AN ORGANIZED HEALTH CARE EDUCATION/TRAINING PROGRAM

## 2023-01-01 PROCEDURE — 72157 MRI CHEST SPINE W/O & W/DYE: CPT

## 2023-01-01 PROCEDURE — 87077 CULTURE AEROBIC IDENTIFY: CPT | Performed by: EMERGENCY MEDICINE

## 2023-01-01 PROCEDURE — 82310 ASSAY OF CALCIUM: CPT | Performed by: EMERGENCY MEDICINE

## 2023-01-01 PROCEDURE — 85041 AUTOMATED RBC COUNT: CPT

## 2023-01-01 PROCEDURE — 88305 TISSUE EXAM BY PATHOLOGIST: CPT | Mod: TC | Performed by: INTERNAL MEDICINE

## 2023-01-01 PROCEDURE — 84132 ASSAY OF SERUM POTASSIUM: CPT | Performed by: STUDENT IN AN ORGANIZED HEALTH CARE EDUCATION/TRAINING PROGRAM

## 2023-01-01 PROCEDURE — 83735 ASSAY OF MAGNESIUM: CPT | Performed by: INTERNAL MEDICINE

## 2023-01-01 RX ORDER — LAMOTRIGINE 200 MG/1
200 TABLET ORAL 2 TIMES DAILY
COMMUNITY

## 2023-01-01 RX ORDER — LEVOTHYROXINE SODIUM 137 UG/1
137 TABLET ORAL DAILY
Status: DISCONTINUED | OUTPATIENT
Start: 2023-01-01 | End: 2023-01-01

## 2023-01-01 RX ORDER — BREXPIPRAZOLE 4 MG/1
4 TABLET ORAL DAILY
COMMUNITY

## 2023-01-01 RX ORDER — DEXTROSE MONOHYDRATE 100 MG/ML
INJECTION, SOLUTION INTRAVENOUS CONTINUOUS PRN
Status: DISCONTINUED | OUTPATIENT
Start: 2023-01-01 | End: 2023-01-01

## 2023-01-01 RX ORDER — NOREPINEPHRINE BITARTRATE 0.02 MG/ML
.01-.6 INJECTION, SOLUTION INTRAVENOUS CONTINUOUS
Status: DISCONTINUED | OUTPATIENT
Start: 2023-01-01 | End: 2023-01-01

## 2023-01-01 RX ORDER — DULOXETIN HYDROCHLORIDE 30 MG/1
30 CAPSULE, DELAYED RELEASE ORAL DAILY
Status: DISCONTINUED | OUTPATIENT
Start: 2023-01-01 | End: 2023-01-01

## 2023-01-01 RX ORDER — LORAZEPAM 1 MG/1
2 TABLET ORAL ONCE
Status: COMPLETED | OUTPATIENT
Start: 2023-01-01 | End: 2023-01-01

## 2023-01-01 RX ORDER — CYCLOBENZAPRINE HCL 10 MG
10 TABLET ORAL 3 TIMES DAILY PRN
COMMUNITY

## 2023-01-01 RX ORDER — HYDROMORPHONE HCL IN WATER/PF 6 MG/30 ML
.2-.5 PATIENT CONTROLLED ANALGESIA SYRINGE INTRAVENOUS
Status: DISCONTINUED | OUTPATIENT
Start: 2023-01-01 | End: 2023-01-01

## 2023-01-01 RX ORDER — NALOXONE HYDROCHLORIDE 0.4 MG/ML
0.4 INJECTION, SOLUTION INTRAMUSCULAR; INTRAVENOUS; SUBCUTANEOUS
Status: DISCONTINUED | OUTPATIENT
Start: 2023-01-01 | End: 2023-01-01

## 2023-01-01 RX ORDER — OXYCODONE HYDROCHLORIDE 10 MG/1
10 TABLET ORAL EVERY 4 HOURS PRN
COMMUNITY

## 2023-01-01 RX ORDER — LISINOPRIL 2.5 MG/1
2.5 TABLET ORAL DAILY
COMMUNITY

## 2023-01-01 RX ORDER — ACETAMINOPHEN 650 MG/1
650 SUPPOSITORY RECTAL EVERY 4 HOURS PRN
Status: DISCONTINUED | OUTPATIENT
Start: 2023-01-01 | End: 2023-01-01 | Stop reason: HOSPADM

## 2023-01-01 RX ORDER — HYDROMORPHONE HYDROCHLORIDE 1 MG/ML
1 SOLUTION ORAL
Status: DISCONTINUED | OUTPATIENT
Start: 2023-01-01 | End: 2023-01-01 | Stop reason: HOSPADM

## 2023-01-01 RX ORDER — LIDOCAINE 40 MG/G
CREAM TOPICAL
Status: DISCONTINUED | OUTPATIENT
Start: 2023-01-01 | End: 2023-01-01

## 2023-01-01 RX ORDER — DEXTROSE MONOHYDRATE 25 G/50ML
25-50 INJECTION, SOLUTION INTRAVENOUS
Status: DISCONTINUED | OUTPATIENT
Start: 2023-01-01 | End: 2023-01-01

## 2023-01-01 RX ORDER — ENOXAPARIN SODIUM 100 MG/ML
40 INJECTION SUBCUTANEOUS EVERY 24 HOURS
Status: DISCONTINUED | OUTPATIENT
Start: 2023-01-01 | End: 2023-01-01

## 2023-01-01 RX ORDER — FENTANYL CITRATE 50 UG/ML
INJECTION, SOLUTION INTRAMUSCULAR; INTRAVENOUS
Status: COMPLETED | OUTPATIENT
Start: 2023-01-01 | End: 2023-01-01

## 2023-01-01 RX ORDER — FENTANYL CITRATE 50 UG/ML
50-100 INJECTION, SOLUTION INTRAMUSCULAR; INTRAVENOUS
Status: DISCONTINUED | OUTPATIENT
Start: 2023-01-01 | End: 2023-01-01

## 2023-01-01 RX ORDER — FUROSEMIDE 20 MG
40 TABLET ORAL DAILY
Status: CANCELLED | OUTPATIENT
Start: 2023-01-01

## 2023-01-01 RX ORDER — NICOTINE POLACRILEX 4 MG
15-30 LOZENGE BUCCAL
Status: DISCONTINUED | OUTPATIENT
Start: 2023-01-01 | End: 2023-01-01

## 2023-01-01 RX ORDER — EXENATIDE 2 MG/.85ML
2 INJECTION, SUSPENSION, EXTENDED RELEASE SUBCUTANEOUS
COMMUNITY

## 2023-01-01 RX ORDER — IOPAMIDOL 755 MG/ML
75 INJECTION, SOLUTION INTRAVASCULAR ONCE
Status: COMPLETED | OUTPATIENT
Start: 2023-01-01 | End: 2023-01-01

## 2023-01-01 RX ORDER — FERROUS SULFATE 325(65) MG
325 TABLET, DELAYED RELEASE (ENTERIC COATED) ORAL EVERY OTHER DAY
COMMUNITY

## 2023-01-01 RX ORDER — LIDOCAINE HYDROCHLORIDE 20 MG/ML
JELLY TOPICAL ONCE
Status: COMPLETED | OUTPATIENT
Start: 2023-01-01 | End: 2023-01-01

## 2023-01-01 RX ORDER — ERGOCALCIFEROL 1.25 MG/1
50000 CAPSULE, LIQUID FILLED ORAL WEEKLY
COMMUNITY

## 2023-01-01 RX ORDER — OLANZAPINE 2.5 MG/1
2.5 TABLET, FILM COATED ORAL AT BEDTIME
COMMUNITY

## 2023-01-01 RX ORDER — CEFAZOLIN SODIUM 1 G/50ML
1250 SOLUTION INTRAVENOUS ONCE
Status: COMPLETED | OUTPATIENT
Start: 2023-01-01 | End: 2023-01-01

## 2023-01-01 RX ORDER — DULOXETIN HYDROCHLORIDE 30 MG/1
30 CAPSULE, DELAYED RELEASE ORAL DAILY
COMMUNITY

## 2023-01-01 RX ORDER — POTASSIUM CHLORIDE 1500 MG/1
40 TABLET, EXTENDED RELEASE ORAL ONCE
Status: COMPLETED | OUTPATIENT
Start: 2023-01-01 | End: 2023-01-01

## 2023-01-01 RX ORDER — TIZANIDINE 2 MG/1
2 TABLET ORAL EVERY 6 HOURS PRN
COMMUNITY

## 2023-01-01 RX ORDER — FUROSEMIDE 20 MG
40 TABLET ORAL ONCE
Status: COMPLETED | OUTPATIENT
Start: 2023-01-01 | End: 2023-01-01

## 2023-01-01 RX ORDER — OXYCODONE HYDROCHLORIDE 5 MG/1
5-10 TABLET ORAL EVERY 4 HOURS PRN
Status: DISCONTINUED | OUTPATIENT
Start: 2023-01-01 | End: 2023-01-01

## 2023-01-01 RX ORDER — PROCHLORPERAZINE 25 MG
12.5 SUPPOSITORY, RECTAL RECTAL EVERY 12 HOURS PRN
Status: DISCONTINUED | OUTPATIENT
Start: 2023-01-01 | End: 2023-01-01 | Stop reason: HOSPADM

## 2023-01-01 RX ORDER — CALCIUM GLUCONATE 20 MG/ML
1 INJECTION, SOLUTION INTRAVENOUS ONCE
Status: COMPLETED | OUTPATIENT
Start: 2023-01-01 | End: 2023-01-01

## 2023-01-01 RX ORDER — CALCIUM CARBONATE 500 MG/1
1 TABLET, CHEWABLE ORAL 2 TIMES DAILY
COMMUNITY

## 2023-01-01 RX ORDER — RISPERIDONE 0.5 MG/1
1 TABLET, ORALLY DISINTEGRATING ORAL ONCE
Status: COMPLETED | OUTPATIENT
Start: 2023-01-01 | End: 2023-01-01

## 2023-01-01 RX ORDER — ATROPINE SULFATE 10 MG/ML
2 SOLUTION/ DROPS OPHTHALMIC EVERY 4 HOURS PRN
Status: DISCONTINUED | OUTPATIENT
Start: 2023-01-01 | End: 2023-01-01 | Stop reason: HOSPADM

## 2023-01-01 RX ORDER — HYDROCHLOROTHIAZIDE 50 MG/1
50 TABLET ORAL DAILY
COMMUNITY

## 2023-01-01 RX ORDER — PROCHLORPERAZINE MALEATE 5 MG
5 TABLET ORAL EVERY 6 HOURS PRN
Status: DISCONTINUED | OUTPATIENT
Start: 2023-01-01 | End: 2023-01-01 | Stop reason: HOSPADM

## 2023-01-01 RX ORDER — OLANZAPINE 2.5 MG/1
2.5 TABLET, FILM COATED ORAL AT BEDTIME
Status: DISCONTINUED | OUTPATIENT
Start: 2023-01-01 | End: 2023-01-01

## 2023-01-01 RX ORDER — LAMOTRIGINE 200 MG/1
200 TABLET ORAL 2 TIMES DAILY
Status: DISCONTINUED | OUTPATIENT
Start: 2023-01-01 | End: 2023-01-01

## 2023-01-01 RX ORDER — LORAZEPAM 1 MG/1
1 TABLET ORAL ONCE
Status: COMPLETED | OUTPATIENT
Start: 2023-01-01 | End: 2023-01-01

## 2023-01-01 RX ORDER — HYDROMORPHONE HYDROCHLORIDE 2 MG/1
2 TABLET ORAL
Status: DISCONTINUED | OUTPATIENT
Start: 2023-01-01 | End: 2023-01-01 | Stop reason: HOSPADM

## 2023-01-01 RX ORDER — FUROSEMIDE 10 MG/ML
40 INJECTION INTRAMUSCULAR; INTRAVENOUS EVERY 12 HOURS
Status: DISCONTINUED | OUTPATIENT
Start: 2023-01-01 | End: 2023-01-01

## 2023-01-01 RX ORDER — CARBOXYMETHYLCELLULOSE SODIUM 5 MG/ML
1-2 SOLUTION/ DROPS OPHTHALMIC
Status: DISCONTINUED | OUTPATIENT
Start: 2023-01-01 | End: 2023-01-01 | Stop reason: HOSPADM

## 2023-01-01 RX ORDER — LACTULOSE 10 G/15ML
20 SOLUTION ORAL 2 TIMES DAILY
Status: DISCONTINUED | OUTPATIENT
Start: 2023-01-01 | End: 2023-01-01

## 2023-01-01 RX ORDER — BISACODYL 5 MG
5 TABLET, DELAYED RELEASE (ENTERIC COATED) ORAL DAILY PRN
Status: DISCONTINUED | OUTPATIENT
Start: 2023-01-01 | End: 2023-01-01 | Stop reason: HOSPADM

## 2023-01-01 RX ORDER — OXYCODONE HYDROCHLORIDE 5 MG/1
5 TABLET ORAL ONCE
Status: COMPLETED | OUTPATIENT
Start: 2023-01-01 | End: 2023-01-01

## 2023-01-01 RX ORDER — LORAZEPAM 2 MG/ML
2 INJECTION INTRAMUSCULAR EVERY 4 HOURS PRN
Status: DISCONTINUED | OUTPATIENT
Start: 2023-01-01 | End: 2023-01-01 | Stop reason: HOSPADM

## 2023-01-01 RX ORDER — LIDOCAINE HYDROCHLORIDE 20 MG/ML
SOLUTION OROPHARYNGEAL
Status: COMPLETED | OUTPATIENT
Start: 2023-01-01 | End: 2023-01-01

## 2023-01-01 RX ORDER — POLYETHYLENE GLYCOL 3350 17 G/17G
17 POWDER, FOR SOLUTION ORAL DAILY PRN
Status: DISCONTINUED | OUTPATIENT
Start: 2023-01-01 | End: 2023-01-01

## 2023-01-01 RX ORDER — FUROSEMIDE 10 MG/ML
20 INJECTION INTRAMUSCULAR; INTRAVENOUS EVERY 12 HOURS
Status: DISCONTINUED | OUTPATIENT
Start: 2023-01-01 | End: 2023-01-01

## 2023-01-01 RX ORDER — NICOTINE POLACRILEX 4 MG/1
20 GUM, CHEWING ORAL DAILY
Status: DISCONTINUED | OUTPATIENT
Start: 2023-01-01 | End: 2023-01-01

## 2023-01-01 RX ORDER — OXYCODONE HYDROCHLORIDE 5 MG/1
10 TABLET ORAL EVERY 6 HOURS
Status: DISCONTINUED | OUTPATIENT
Start: 2023-01-01 | End: 2023-01-01

## 2023-01-01 RX ORDER — LEVOTHYROXINE SODIUM 137 UG/1
137 TABLET ORAL DAILY
COMMUNITY

## 2023-01-01 RX ORDER — HYDROMORPHONE HYDROCHLORIDE 1 MG/ML
2 SOLUTION ORAL
Status: DISCONTINUED | OUTPATIENT
Start: 2023-01-01 | End: 2023-01-01 | Stop reason: HOSPADM

## 2023-01-01 RX ORDER — ALBUTEROL SULFATE 0.83 MG/ML
2.5 SOLUTION RESPIRATORY (INHALATION)
Status: DISCONTINUED | OUTPATIENT
Start: 2023-01-01 | End: 2023-01-01

## 2023-01-01 RX ORDER — HALOPERIDOL 2 MG/ML
2 SOLUTION ORAL EVERY 6 HOURS PRN
Status: DISCONTINUED | OUTPATIENT
Start: 2023-01-01 | End: 2023-01-01

## 2023-01-01 RX ORDER — CEFAZOLIN SODIUM 2 G/100ML
2 INJECTION, SOLUTION INTRAVENOUS EVERY 8 HOURS
Status: DISCONTINUED | OUTPATIENT
Start: 2023-01-01 | End: 2023-01-01

## 2023-01-01 RX ORDER — SODIUM CHLORIDE 9 MG/ML
INJECTION, SOLUTION INTRAVENOUS CONTINUOUS
Status: DISCONTINUED | OUTPATIENT
Start: 2023-01-01 | End: 2023-01-01

## 2023-01-01 RX ORDER — ONDANSETRON 4 MG/1
4 TABLET, ORALLY DISINTEGRATING ORAL EVERY 6 HOURS PRN
Status: DISCONTINUED | OUTPATIENT
Start: 2023-01-01 | End: 2023-01-01 | Stop reason: HOSPADM

## 2023-01-01 RX ORDER — CEFAZOLIN SODIUM 1 G/3ML
1 INJECTION, POWDER, FOR SOLUTION INTRAMUSCULAR; INTRAVENOUS EVERY 8 HOURS
Status: DISCONTINUED | OUTPATIENT
Start: 2023-01-01 | End: 2023-01-01

## 2023-01-01 RX ORDER — LEVOFLOXACIN 5 MG/ML
500 INJECTION, SOLUTION INTRAVENOUS ONCE
Status: COMPLETED | OUTPATIENT
Start: 2023-01-01 | End: 2023-01-01

## 2023-01-01 RX ORDER — FENTANYL CITRATE 50 UG/ML
100 INJECTION, SOLUTION INTRAMUSCULAR; INTRAVENOUS
Status: DISCONTINUED | OUTPATIENT
Start: 2023-01-01 | End: 2023-01-01

## 2023-01-01 RX ORDER — LIDOCAINE 40 MG/G
CREAM TOPICAL
Status: ACTIVE | OUTPATIENT
Start: 2023-01-01 | End: 2023-01-01

## 2023-01-01 RX ORDER — ACETAMINOPHEN 500 MG
500 TABLET ORAL 4 TIMES DAILY PRN
Status: DISCONTINUED | OUTPATIENT
Start: 2023-01-01 | End: 2023-01-01 | Stop reason: HOSPADM

## 2023-01-01 RX ORDER — NALOXONE HYDROCHLORIDE 0.4 MG/ML
0.2 INJECTION, SOLUTION INTRAMUSCULAR; INTRAVENOUS; SUBCUTANEOUS
Status: DISCONTINUED | OUTPATIENT
Start: 2023-01-01 | End: 2023-01-01

## 2023-01-01 RX ORDER — OXYCODONE HYDROCHLORIDE 5 MG/1
10 TABLET ORAL EVERY 4 HOURS PRN
Status: DISCONTINUED | OUTPATIENT
Start: 2023-01-01 | End: 2023-01-01

## 2023-01-01 RX ORDER — LORAZEPAM 0.5 MG/1
0.5 TABLET ORAL ONCE
Status: DISCONTINUED | OUTPATIENT
Start: 2023-01-01 | End: 2023-01-01

## 2023-01-01 RX ORDER — PANTOPRAZOLE SODIUM 40 MG/1
40 TABLET, DELAYED RELEASE ORAL
Status: DISCONTINUED | OUTPATIENT
Start: 2023-01-01 | End: 2023-01-01

## 2023-01-01 RX ORDER — LEVOFLOXACIN 5 MG/ML
500 INJECTION, SOLUTION INTRAVENOUS EVERY 24 HOURS
Status: DISCONTINUED | OUTPATIENT
Start: 2023-01-01 | End: 2023-01-01

## 2023-01-01 RX ORDER — NICOTINE POLACRILEX 4 MG/1
20 GUM, CHEWING ORAL DAILY
COMMUNITY

## 2023-01-01 RX ORDER — CEFAZOLIN SODIUM 2 G/100ML
2 INJECTION, SOLUTION INTRAVENOUS EVERY 8 HOURS
Qty: 12300 ML | Refills: 0 | Status: SHIPPED | OUTPATIENT
Start: 2023-01-01 | End: 2023-09-14

## 2023-01-01 RX ORDER — BISACODYL 5 MG
10 TABLET, DELAYED RELEASE (ENTERIC COATED) ORAL DAILY PRN
Status: DISCONTINUED | OUTPATIENT
Start: 2023-01-01 | End: 2023-01-01 | Stop reason: HOSPADM

## 2023-01-01 RX ORDER — POLYETHYLENE GLYCOL 3350 17 G/17G
17 POWDER, FOR SOLUTION ORAL DAILY
Status: DISCONTINUED | OUTPATIENT
Start: 2023-01-01 | End: 2023-01-01

## 2023-01-01 RX ORDER — CETIRIZINE HYDROCHLORIDE 10 MG/1
10 TABLET ORAL DAILY
COMMUNITY

## 2023-01-01 RX ORDER — FENTANYL CITRATE 50 UG/ML
25-50 INJECTION, SOLUTION INTRAMUSCULAR; INTRAVENOUS EVERY 5 MIN PRN
Status: DISCONTINUED | OUTPATIENT
Start: 2023-01-01 | End: 2023-01-01

## 2023-01-01 RX ORDER — SODIUM CHLORIDE, SODIUM LACTATE, POTASSIUM CHLORIDE, CALCIUM CHLORIDE 600; 310; 30; 20 MG/100ML; MG/100ML; MG/100ML; MG/100ML
INJECTION, SOLUTION INTRAVENOUS CONTINUOUS
Status: DISCONTINUED | OUTPATIENT
Start: 2023-01-01 | End: 2023-01-01

## 2023-01-01 RX ORDER — FENTANYL CITRATE 50 UG/ML
50 INJECTION, SOLUTION INTRAMUSCULAR; INTRAVENOUS
Status: DISCONTINUED | OUTPATIENT
Start: 2023-01-01 | End: 2023-01-01

## 2023-01-01 RX ORDER — SENNOSIDES A AND B 8.6 MG/1
2 TABLET, FILM COATED ORAL 2 TIMES DAILY PRN
COMMUNITY

## 2023-01-01 RX ORDER — DEXTROSE MONOHYDRATE 50 MG/ML
INJECTION, SOLUTION INTRAVENOUS CONTINUOUS
Status: DISCONTINUED | OUTPATIENT
Start: 2023-01-01 | End: 2023-01-01

## 2023-01-01 RX ORDER — MULTIPLE VITAMINS W/ MINERALS TAB 9MG-400MCG
1 TAB ORAL DAILY
Status: DISCONTINUED | OUTPATIENT
Start: 2023-01-01 | End: 2023-01-01

## 2023-01-01 RX ORDER — ACETAMINOPHEN 500 MG
500 TABLET ORAL EVERY 6 HOURS PRN
COMMUNITY

## 2023-01-01 RX ORDER — HYDROMORPHONE HCL IN WATER/PF 6 MG/30 ML
.2-.5 PATIENT CONTROLLED ANALGESIA SYRINGE INTRAVENOUS EVERY 30 MIN PRN
Status: DISCONTINUED | OUTPATIENT
Start: 2023-01-01 | End: 2023-01-01 | Stop reason: HOSPADM

## 2023-01-01 RX ORDER — DEXMEDETOMIDINE HYDROCHLORIDE 4 UG/ML
.1-1.2 INJECTION, SOLUTION INTRAVENOUS CONTINUOUS
Status: DISCONTINUED | OUTPATIENT
Start: 2023-01-01 | End: 2023-01-01 | Stop reason: HOSPADM

## 2023-01-01 RX ORDER — GUAIFENESIN 600 MG/1
15 TABLET, EXTENDED RELEASE ORAL DAILY
Status: DISCONTINUED | OUTPATIENT
Start: 2023-01-01 | End: 2023-01-01

## 2023-01-01 RX ORDER — NYSTATIN 100000 [USP'U]/G
POWDER TOPICAL 2 TIMES DAILY
COMMUNITY

## 2023-01-01 RX ORDER — INSULIN ASPART 100 [IU]/ML
13 INJECTION, SOLUTION INTRAVENOUS; SUBCUTANEOUS DAILY
COMMUNITY
End: 2023-01-01

## 2023-01-01 RX ORDER — LISINOPRIL 2.5 MG/1
2.5 TABLET ORAL DAILY
Status: DISCONTINUED | OUTPATIENT
Start: 2023-01-01 | End: 2023-01-01

## 2023-01-01 RX ORDER — ONDANSETRON 2 MG/ML
4 INJECTION INTRAMUSCULAR; INTRAVENOUS EVERY 6 HOURS PRN
Status: DISCONTINUED | OUTPATIENT
Start: 2023-01-01 | End: 2023-01-01 | Stop reason: HOSPADM

## 2023-01-01 RX ORDER — GADOBUTROL 604.72 MG/ML
9.5 INJECTION INTRAVENOUS ONCE
Status: COMPLETED | OUTPATIENT
Start: 2023-01-01 | End: 2023-01-01

## 2023-01-01 RX ORDER — CEFAZOLIN SODIUM 1 G/50ML
1250 SOLUTION INTRAVENOUS EVERY 24 HOURS
Status: DISCONTINUED | OUTPATIENT
Start: 2023-01-01 | End: 2023-01-01

## 2023-01-01 RX ORDER — HALOPERIDOL 5 MG/ML
2 INJECTION INTRAMUSCULAR EVERY 6 HOURS PRN
Status: DISCONTINUED | OUTPATIENT
Start: 2023-01-01 | End: 2023-01-01 | Stop reason: HOSPADM

## 2023-01-01 RX ORDER — LORAZEPAM 0.5 MG/1
0.25 TABLET ORAL ONCE
Status: COMPLETED | OUTPATIENT
Start: 2023-01-01 | End: 2023-01-01

## 2023-01-01 RX ADMIN — SODIUM CHLORIDE, POTASSIUM CHLORIDE, SODIUM LACTATE AND CALCIUM CHLORIDE: 600; 310; 30; 20 INJECTION, SOLUTION INTRAVENOUS at 08:57

## 2023-01-01 RX ADMIN — FUROSEMIDE 40 MG: 10 INJECTION, SOLUTION INTRAMUSCULAR; INTRAVENOUS at 08:13

## 2023-01-01 RX ADMIN — OLANZAPINE 2.5 MG: 2.5 TABLET, FILM COATED ORAL at 21:04

## 2023-01-01 RX ADMIN — NOREPINEPHRINE BITARTRATE 0.04 MCG/KG/MIN: 1 INJECTION, SOLUTION, CONCENTRATE INTRAVENOUS at 10:08

## 2023-01-01 RX ADMIN — ACETAMINOPHEN 500 MG: 500 TABLET ORAL at 07:57

## 2023-01-01 RX ADMIN — INSULIN ASPART 2 UNITS: 100 INJECTION, SOLUTION INTRAVENOUS; SUBCUTANEOUS at 17:25

## 2023-01-01 RX ADMIN — OXYCODONE HYDROCHLORIDE 5 MG: 5 TABLET ORAL at 08:22

## 2023-01-01 RX ADMIN — LACTULOSE 20 G: 10 SOLUTION ORAL at 08:21

## 2023-01-01 RX ADMIN — INSULIN ASPART 1 UNITS: 100 INJECTION, SOLUTION INTRAVENOUS; SUBCUTANEOUS at 05:00

## 2023-01-01 RX ADMIN — ALBUMIN HUMAN 12.5 G: 0.05 INJECTION, SOLUTION INTRAVENOUS at 21:40

## 2023-01-01 RX ADMIN — POLYETHYLENE GLYCOL 3350 17 G: 17 POWDER, FOR SOLUTION ORAL at 08:31

## 2023-01-01 RX ADMIN — OLANZAPINE 2.5 MG: 2.5 TABLET, FILM COATED ORAL at 21:30

## 2023-01-01 RX ADMIN — FUROSEMIDE 20 MG: 10 INJECTION, SOLUTION INTRAMUSCULAR; INTRAVENOUS at 07:57

## 2023-01-01 RX ADMIN — OXYCODONE HYDROCHLORIDE 10 MG: 5 TABLET ORAL at 20:16

## 2023-01-01 RX ADMIN — LAMOTRIGINE 200 MG: 200 TABLET ORAL at 09:18

## 2023-01-01 RX ADMIN — ACETAMINOPHEN 500 MG: 500 TABLET ORAL at 08:54

## 2023-01-01 RX ADMIN — MICONAZOLE NITRATE ANTIFUNGAL POWDER: 2 POWDER TOPICAL at 08:07

## 2023-01-01 RX ADMIN — CEFAZOLIN SODIUM 2 G: 2 INJECTION, SOLUTION INTRAVENOUS at 13:29

## 2023-01-01 RX ADMIN — FENTANYL CITRATE 50 MCG: 50 INJECTION, SOLUTION INTRAMUSCULAR; INTRAVENOUS at 19:09

## 2023-01-01 RX ADMIN — TOPICAL ANESTHETIC 0.5 EACH: 200 SPRAY DENTAL; PERIODONTAL at 09:32

## 2023-01-01 RX ADMIN — CEFAZOLIN SODIUM 2 G: 2 INJECTION, SOLUTION INTRAVENOUS at 22:29

## 2023-01-01 RX ADMIN — LACTULOSE 20 G: 10 SOLUTION ORAL at 20:15

## 2023-01-01 RX ADMIN — ANORECTAL OINTMENT: 15.7; .44; 24; 20.6 OINTMENT TOPICAL at 04:17

## 2023-01-01 RX ADMIN — MIDAZOLAM 0.5 MG: 1 INJECTION INTRAMUSCULAR; INTRAVENOUS at 09:22

## 2023-01-01 RX ADMIN — OLANZAPINE 2.5 MG: 2.5 TABLET, FILM COATED ORAL at 21:12

## 2023-01-01 RX ADMIN — POTASSIUM CHLORIDE 40 MEQ: 1500 TABLET, EXTENDED RELEASE ORAL at 21:07

## 2023-01-01 RX ADMIN — OXYCODONE HYDROCHLORIDE 10 MG: 5 TABLET ORAL at 08:29

## 2023-01-01 RX ADMIN — OXYCODONE HYDROCHLORIDE 10 MG: 5 TABLET ORAL at 12:59

## 2023-01-01 RX ADMIN — OLANZAPINE 2.5 MG: 5 TABLET, FILM COATED ORAL at 20:36

## 2023-01-01 RX ADMIN — ALBUTEROL SULFATE 2.5 MG: 2.5 SOLUTION RESPIRATORY (INHALATION) at 01:50

## 2023-01-01 RX ADMIN — INSULIN ASPART 1 UNITS: 100 INJECTION, SOLUTION INTRAVENOUS; SUBCUTANEOUS at 14:01

## 2023-01-01 RX ADMIN — INSULIN ASPART 3 UNITS: 100 INJECTION, SOLUTION INTRAVENOUS; SUBCUTANEOUS at 17:53

## 2023-01-01 RX ADMIN — CEFAZOLIN SODIUM 2 G: 2 INJECTION, SOLUTION INTRAVENOUS at 16:31

## 2023-01-01 RX ADMIN — CEFAZOLIN SODIUM 2 G: 2 INJECTION, SOLUTION INTRAVENOUS at 05:23

## 2023-01-01 RX ADMIN — SODIUM CHLORIDE: 9 INJECTION, SOLUTION INTRAVENOUS at 09:04

## 2023-01-01 RX ADMIN — CEFAZOLIN SODIUM 2 G: 2 INJECTION, SOLUTION INTRAVENOUS at 08:12

## 2023-01-01 RX ADMIN — LEVOTHYROXINE SODIUM 137 MCG: 0.14 TABLET ORAL at 05:55

## 2023-01-01 RX ADMIN — MICONAZOLE NITRATE ANTIFUNGAL POWDER: 2 POWDER TOPICAL at 03:31

## 2023-01-01 RX ADMIN — HYDROMORPHONE HYDROCHLORIDE 0.4 MG: 0.2 INJECTION, SOLUTION INTRAMUSCULAR; INTRAVENOUS; SUBCUTANEOUS at 19:57

## 2023-01-01 RX ADMIN — OXYCODONE HYDROCHLORIDE 10 MG: 5 TABLET ORAL at 18:14

## 2023-01-01 RX ADMIN — OLANZAPINE 2.5 MG: 2.5 TABLET, FILM COATED ORAL at 21:18

## 2023-01-01 RX ADMIN — INSULIN ASPART 2 UNITS: 100 INJECTION, SOLUTION INTRAVENOUS; SUBCUTANEOUS at 15:29

## 2023-01-01 RX ADMIN — HYDROMORPHONE HYDROCHLORIDE 0.4 MG: 0.2 INJECTION, SOLUTION INTRAMUSCULAR; INTRAVENOUS; SUBCUTANEOUS at 16:28

## 2023-01-01 RX ADMIN — LAMOTRIGINE 200 MG: 200 TABLET ORAL at 21:08

## 2023-01-01 RX ADMIN — ENOXAPARIN SODIUM 40 MG: 40 INJECTION SUBCUTANEOUS at 16:59

## 2023-01-01 RX ADMIN — ALBUMIN HUMAN 12.5 G: 0.05 INJECTION, SOLUTION INTRAVENOUS at 05:10

## 2023-01-01 RX ADMIN — INSULIN ASPART 3 UNITS: 100 INJECTION, SOLUTION INTRAVENOUS; SUBCUTANEOUS at 08:14

## 2023-01-01 RX ADMIN — MICONAZOLE NITRATE ANTIFUNGAL POWDER: 2 POWDER TOPICAL at 21:09

## 2023-01-01 RX ADMIN — MICONAZOLE NITRATE ANTIFUNGAL POWDER: 2 POWDER TOPICAL at 08:04

## 2023-01-01 RX ADMIN — FUROSEMIDE 20 MG: 10 INJECTION, SOLUTION INTRAMUSCULAR; INTRAVENOUS at 08:20

## 2023-01-01 RX ADMIN — LACTULOSE 20 G: 10 SOLUTION ORAL at 20:19

## 2023-01-01 RX ADMIN — PANTOPRAZOLE SODIUM 40 MG: 40 TABLET, DELAYED RELEASE ORAL at 06:40

## 2023-01-01 RX ADMIN — ANORECTAL OINTMENT: 15.7; .44; 24; 20.6 OINTMENT TOPICAL at 20:00

## 2023-01-01 RX ADMIN — ALBUMIN HUMAN 12.5 G: 0.05 INJECTION, SOLUTION INTRAVENOUS at 05:42

## 2023-01-01 RX ADMIN — DORNASE ALFA 50 ML: 1 SOLUTION RESPIRATORY (INHALATION) at 10:14

## 2023-01-01 RX ADMIN — FENTANYL CITRATE 50 MCG: 50 INJECTION, SOLUTION INTRAMUSCULAR; INTRAVENOUS at 12:08

## 2023-01-01 RX ADMIN — INSULIN ASPART 3 UNITS: 100 INJECTION, SOLUTION INTRAVENOUS; SUBCUTANEOUS at 17:57

## 2023-01-01 RX ADMIN — DEXMEDETOMIDINE HYDROCHLORIDE 1 MCG/KG/HR: 400 INJECTION INTRAVENOUS at 02:16

## 2023-01-01 RX ADMIN — OLANZAPINE 2.5 MG: 2.5 TABLET, FILM COATED ORAL at 21:45

## 2023-01-01 RX ADMIN — LAMOTRIGINE 200 MG: 200 TABLET ORAL at 22:08

## 2023-01-01 RX ADMIN — ALBUMIN HUMAN 12.5 G: 0.05 INJECTION, SOLUTION INTRAVENOUS at 22:54

## 2023-01-01 RX ADMIN — CEFAZOLIN SODIUM 2 G: 2 INJECTION, SOLUTION INTRAVENOUS at 01:17

## 2023-01-01 RX ADMIN — LISINOPRIL 2.5 MG: 2.5 TABLET ORAL at 10:34

## 2023-01-01 RX ADMIN — INSULIN ASPART 5 UNITS: 100 INJECTION, SOLUTION INTRAVENOUS; SUBCUTANEOUS at 17:03

## 2023-01-01 RX ADMIN — LISINOPRIL 2.5 MG: 2.5 TABLET ORAL at 12:41

## 2023-01-01 RX ADMIN — FENTANYL CITRATE 50 MCG: 50 INJECTION, SOLUTION INTRAMUSCULAR; INTRAVENOUS at 00:01

## 2023-01-01 RX ADMIN — ACETAMINOPHEN 500 MG: 500 TABLET ORAL at 21:40

## 2023-01-01 RX ADMIN — MIDAZOLAM 0.5 MG: 1 INJECTION INTRAMUSCULAR; INTRAVENOUS at 09:25

## 2023-01-01 RX ADMIN — DEXMEDETOMIDINE HYDROCHLORIDE 0.9 MCG/KG/HR: 400 INJECTION INTRAVENOUS at 10:18

## 2023-01-01 RX ADMIN — NOREPINEPHRINE BITARTRATE 0.03 MCG/KG/MIN: 0.02 INJECTION, SOLUTION INTRAVENOUS at 10:04

## 2023-01-01 RX ADMIN — MICONAZOLE NITRATE ANTIFUNGAL POWDER: 2 POWDER TOPICAL at 08:31

## 2023-01-01 RX ADMIN — OLANZAPINE 2.5 MG: 2.5 TABLET, FILM COATED ORAL at 20:00

## 2023-01-01 RX ADMIN — DEXMEDETOMIDINE HYDROCHLORIDE 0.2 MCG/KG/HR: 400 INJECTION INTRAVENOUS at 10:05

## 2023-01-01 RX ADMIN — SODIUM CHLORIDE, POTASSIUM CHLORIDE, SODIUM LACTATE AND CALCIUM CHLORIDE: 600; 310; 30; 20 INJECTION, SOLUTION INTRAVENOUS at 22:25

## 2023-01-01 RX ADMIN — Medication 1 TABLET: at 12:53

## 2023-01-01 RX ADMIN — SODIUM CHLORIDE, POTASSIUM CHLORIDE, SODIUM LACTATE AND CALCIUM CHLORIDE: 600; 310; 30; 20 INJECTION, SOLUTION INTRAVENOUS at 19:04

## 2023-01-01 RX ADMIN — DEXMEDETOMIDINE HYDROCHLORIDE 1.2 MCG/KG/HR: 400 INJECTION INTRAVENOUS at 09:03

## 2023-01-01 RX ADMIN — POLYETHYLENE GLYCOL 3350 17 G: 17 POWDER, FOR SOLUTION ORAL at 09:01

## 2023-01-01 RX ADMIN — LEVOTHYROXINE SODIUM 137 MCG: 0.14 TABLET ORAL at 06:12

## 2023-01-01 RX ADMIN — FUROSEMIDE 20 MG: 10 INJECTION, SOLUTION INTRAMUSCULAR; INTRAVENOUS at 20:29

## 2023-01-01 RX ADMIN — PANTOPRAZOLE SODIUM 40 MG: 40 TABLET, DELAYED RELEASE ORAL at 12:12

## 2023-01-01 RX ADMIN — OXYCODONE HYDROCHLORIDE 10 MG: 5 TABLET ORAL at 05:07

## 2023-01-01 RX ADMIN — FENTANYL CITRATE 100 MCG: 50 INJECTION, SOLUTION INTRAMUSCULAR; INTRAVENOUS at 15:29

## 2023-01-01 RX ADMIN — LAMOTRIGINE 200 MG: 200 TABLET ORAL at 20:54

## 2023-01-01 RX ADMIN — Medication 1 TABLET: at 12:12

## 2023-01-01 RX ADMIN — INSULIN ASPART 2 UNITS: 100 INJECTION, SOLUTION INTRAVENOUS; SUBCUTANEOUS at 16:58

## 2023-01-01 RX ADMIN — CEFAZOLIN SODIUM 2 G: 2 INJECTION, SOLUTION INTRAVENOUS at 00:02

## 2023-01-01 RX ADMIN — PANTOPRAZOLE SODIUM 40 MG: 40 TABLET, DELAYED RELEASE ORAL at 06:03

## 2023-01-01 RX ADMIN — OXYCODONE HYDROCHLORIDE 10 MG: 5 TABLET ORAL at 21:18

## 2023-01-01 RX ADMIN — DORNASE ALFA 50 ML: 1 SOLUTION RESPIRATORY (INHALATION) at 22:08

## 2023-01-01 RX ADMIN — OLANZAPINE 2.5 MG: 2.5 TABLET, FILM COATED ORAL at 21:09

## 2023-01-01 RX ADMIN — INSULIN ASPART 3 UNITS: 100 INJECTION, SOLUTION INTRAVENOUS; SUBCUTANEOUS at 03:49

## 2023-01-01 RX ADMIN — PANTOPRAZOLE SODIUM 40 MG: 40 TABLET, DELAYED RELEASE ORAL at 06:52

## 2023-01-01 RX ADMIN — MIDAZOLAM 1 MG: 1 INJECTION INTRAMUSCULAR; INTRAVENOUS at 09:29

## 2023-01-01 RX ADMIN — MICONAZOLE NITRATE ANTIFUNGAL POWDER: 2 POWDER TOPICAL at 08:22

## 2023-01-01 RX ADMIN — LAMOTRIGINE 200 MG: 200 TABLET ORAL at 08:03

## 2023-01-01 RX ADMIN — MICONAZOLE NITRATE ANTIFUNGAL POWDER: 2 POWDER TOPICAL at 20:16

## 2023-01-01 RX ADMIN — INSULIN ASPART 2 UNITS: 100 INJECTION, SOLUTION INTRAVENOUS; SUBCUTANEOUS at 12:53

## 2023-01-01 RX ADMIN — OLANZAPINE 2.5 MG: 2.5 TABLET, FILM COATED ORAL at 20:38

## 2023-01-01 RX ADMIN — Medication 1 TABLET: at 12:09

## 2023-01-01 RX ADMIN — LAMOTRIGINE 200 MG: 200 TABLET ORAL at 08:06

## 2023-01-01 RX ADMIN — POLYETHYLENE GLYCOL 3350 17 G: 17 POWDER, FOR SOLUTION ORAL at 12:46

## 2023-01-01 RX ADMIN — CEFAZOLIN SODIUM 2 G: 2 INJECTION, SOLUTION INTRAVENOUS at 05:43

## 2023-01-01 RX ADMIN — OXYCODONE HYDROCHLORIDE 10 MG: 5 TABLET ORAL at 09:14

## 2023-01-01 RX ADMIN — ENOXAPARIN SODIUM 40 MG: 40 INJECTION SUBCUTANEOUS at 17:25

## 2023-01-01 RX ADMIN — LEVOTHYROXINE SODIUM 137 MCG: 0.14 TABLET ORAL at 07:59

## 2023-01-01 RX ADMIN — DORNASE ALFA 50 ML: 1 SOLUTION RESPIRATORY (INHALATION) at 20:15

## 2023-01-01 RX ADMIN — INSULIN ASPART 2 UNITS: 100 INJECTION, SOLUTION INTRAVENOUS; SUBCUTANEOUS at 21:16

## 2023-01-01 RX ADMIN — MICONAZOLE NITRATE ANTIFUNGAL POWDER: 2 POWDER TOPICAL at 21:07

## 2023-01-01 RX ADMIN — CEFAZOLIN SODIUM 2 G: 2 INJECTION, SOLUTION INTRAVENOUS at 16:38

## 2023-01-01 RX ADMIN — CEFAZOLIN SODIUM 2 G: 2 INJECTION, SOLUTION INTRAVENOUS at 06:20

## 2023-01-01 RX ADMIN — MICONAZOLE NITRATE ANTIFUNGAL POWDER: 2 POWDER TOPICAL at 20:04

## 2023-01-01 RX ADMIN — LAMOTRIGINE 200 MG: 200 TABLET ORAL at 21:30

## 2023-01-01 RX ADMIN — LISINOPRIL 2.5 MG: 2.5 TABLET ORAL at 08:06

## 2023-01-01 RX ADMIN — INSULIN ASPART 2 UNITS: 100 INJECTION, SOLUTION INTRAVENOUS; SUBCUTANEOUS at 09:21

## 2023-01-01 RX ADMIN — INSULIN ASPART 10 UNITS: 100 INJECTION, SOLUTION INTRAVENOUS; SUBCUTANEOUS at 18:06

## 2023-01-01 RX ADMIN — DEXTROSE MONOHYDRATE: 50 INJECTION, SOLUTION INTRAVENOUS at 00:03

## 2023-01-01 RX ADMIN — OXYCODONE HYDROCHLORIDE 10 MG: 5 TABLET ORAL at 22:19

## 2023-01-01 RX ADMIN — ENOXAPARIN SODIUM 40 MG: 40 INJECTION SUBCUTANEOUS at 17:31

## 2023-01-01 RX ADMIN — MICONAZOLE NITRATE ANTIFUNGAL POWDER: 2 POWDER TOPICAL at 12:44

## 2023-01-01 RX ADMIN — RISPERIDONE 1 MG: 0.5 TABLET, ORALLY DISINTEGRATING ORAL at 23:16

## 2023-01-01 RX ADMIN — LEVOTHYROXINE SODIUM 137 MCG: 0.14 TABLET ORAL at 06:38

## 2023-01-01 RX ADMIN — ALBUMIN HUMAN 12.5 G: 0.05 INJECTION, SOLUTION INTRAVENOUS at 14:51

## 2023-01-01 RX ADMIN — INSULIN ASPART 2 UNITS: 100 INJECTION, SOLUTION INTRAVENOUS; SUBCUTANEOUS at 11:43

## 2023-01-01 RX ADMIN — CEFAZOLIN SODIUM 2 G: 2 INJECTION, SOLUTION INTRAVENOUS at 01:19

## 2023-01-01 RX ADMIN — LEVOTHYROXINE SODIUM 137 MCG: 0.14 TABLET ORAL at 06:02

## 2023-01-01 RX ADMIN — LIDOCAINE HYDROCHLORIDE 10 ML: 20 SOLUTION ORAL; TOPICAL at 09:14

## 2023-01-01 RX ADMIN — DEXMEDETOMIDINE HYDROCHLORIDE 0.9 MCG/KG/HR: 400 INJECTION INTRAVENOUS at 05:29

## 2023-01-01 RX ADMIN — CEFAZOLIN SODIUM 2 G: 2 INJECTION, SOLUTION INTRAVENOUS at 07:58

## 2023-01-01 RX ADMIN — OXYCODONE HYDROCHLORIDE 10 MG: 5 TABLET ORAL at 11:56

## 2023-01-01 RX ADMIN — CEFAZOLIN 1 G: 1 INJECTION, POWDER, FOR SOLUTION INTRAMUSCULAR; INTRAVENOUS at 06:52

## 2023-01-01 RX ADMIN — LEVOTHYROXINE SODIUM 137 MCG: 0.14 TABLET ORAL at 06:40

## 2023-01-01 RX ADMIN — OXYCODONE HYDROCHLORIDE 10 MG: 5 TABLET ORAL at 04:19

## 2023-01-01 RX ADMIN — LAMOTRIGINE 200 MG: 200 TABLET ORAL at 21:18

## 2023-01-01 RX ADMIN — LEVOTHYROXINE SODIUM 137 MCG: 0.14 TABLET ORAL at 06:30

## 2023-01-01 RX ADMIN — VANCOMYCIN HYDROCHLORIDE 1250 MG: 5 INJECTION, POWDER, LYOPHILIZED, FOR SOLUTION INTRAVENOUS at 13:43

## 2023-01-01 RX ADMIN — SODIUM CHLORIDE, POTASSIUM CHLORIDE, SODIUM LACTATE AND CALCIUM CHLORIDE 500 ML: 600; 310; 30; 20 INJECTION, SOLUTION INTRAVENOUS at 20:55

## 2023-01-01 RX ADMIN — INSULIN ASPART 4 UNITS: 100 INJECTION, SOLUTION INTRAVENOUS; SUBCUTANEOUS at 16:30

## 2023-01-01 RX ADMIN — MICONAZOLE NITRATE ANTIFUNGAL POWDER: 2 POWDER TOPICAL at 08:16

## 2023-01-01 RX ADMIN — DEXMEDETOMIDINE HYDROCHLORIDE 1 MCG/KG/HR: 400 INJECTION INTRAVENOUS at 01:55

## 2023-01-01 RX ADMIN — HYDROMORPHONE HYDROCHLORIDE 0.4 MG: 0.2 INJECTION, SOLUTION INTRAMUSCULAR; INTRAVENOUS; SUBCUTANEOUS at 04:11

## 2023-01-01 RX ADMIN — NOREPINEPHRINE BITARTRATE 0.07 MCG/KG/MIN: 1 INJECTION, SOLUTION, CONCENTRATE INTRAVENOUS at 19:56

## 2023-01-01 RX ADMIN — OXYCODONE HYDROCHLORIDE 10 MG: 5 TABLET ORAL at 19:07

## 2023-01-01 RX ADMIN — LAMOTRIGINE 200 MG: 200 TABLET ORAL at 08:31

## 2023-01-01 RX ADMIN — MICONAZOLE NITRATE ANTIFUNGAL POWDER: 2 POWDER TOPICAL at 08:54

## 2023-01-01 RX ADMIN — OLANZAPINE 2.5 MG: 5 TABLET, FILM COATED ORAL at 20:19

## 2023-01-01 RX ADMIN — ACETAMINOPHEN 500 MG: 500 TABLET ORAL at 22:29

## 2023-01-01 RX ADMIN — CEFAZOLIN SODIUM 2 G: 2 INJECTION, SOLUTION INTRAVENOUS at 23:57

## 2023-01-01 RX ADMIN — INSULIN ASPART 2 UNITS: 100 INJECTION, SOLUTION INTRAVENOUS; SUBCUTANEOUS at 04:02

## 2023-01-01 RX ADMIN — FENTANYL CITRATE 50 MCG: 50 INJECTION, SOLUTION INTRAMUSCULAR; INTRAVENOUS at 08:44

## 2023-01-01 RX ADMIN — NOREPINEPHRINE BITARTRATE 0.03 MCG/KG/MIN: 1 INJECTION, SOLUTION, CONCENTRATE INTRAVENOUS at 04:35

## 2023-01-01 RX ADMIN — MICONAZOLE NITRATE ANTIFUNGAL POWDER: 2 POWDER TOPICAL at 21:03

## 2023-01-01 RX ADMIN — CEFAZOLIN SODIUM 2 G: 2 INJECTION, SOLUTION INTRAVENOUS at 23:26

## 2023-01-01 RX ADMIN — INSULIN ASPART 3 UNITS: 100 INJECTION, SOLUTION INTRAVENOUS; SUBCUTANEOUS at 05:00

## 2023-01-01 RX ADMIN — DORNASE ALFA 50 ML: 1 SOLUTION RESPIRATORY (INHALATION) at 09:36

## 2023-01-01 RX ADMIN — OXYCODONE HYDROCHLORIDE 10 MG: 5 TABLET ORAL at 14:32

## 2023-01-01 RX ADMIN — OXYCODONE HYDROCHLORIDE 10 MG: 5 TABLET ORAL at 20:27

## 2023-01-01 RX ADMIN — CEFAZOLIN SODIUM 2 G: 2 INJECTION, SOLUTION INTRAVENOUS at 14:24

## 2023-01-01 RX ADMIN — ALBUTEROL SULFATE 2.5 MG: 2.5 SOLUTION RESPIRATORY (INHALATION) at 07:14

## 2023-01-01 RX ADMIN — INSULIN ASPART 3 UNITS: 100 INJECTION, SOLUTION INTRAVENOUS; SUBCUTANEOUS at 13:14

## 2023-01-01 RX ADMIN — FENTANYL CITRATE 100 MCG: 50 INJECTION, SOLUTION INTRAMUSCULAR; INTRAVENOUS at 05:41

## 2023-01-01 RX ADMIN — LAMOTRIGINE 200 MG: 200 TABLET ORAL at 09:10

## 2023-01-01 RX ADMIN — SODIUM CHLORIDE, POTASSIUM CHLORIDE, SODIUM LACTATE AND CALCIUM CHLORIDE: 600; 310; 30; 20 INJECTION, SOLUTION INTRAVENOUS at 06:22

## 2023-01-01 RX ADMIN — POLYETHYLENE GLYCOL 3350 17 G: 17 POWDER, FOR SOLUTION ORAL at 09:10

## 2023-01-01 RX ADMIN — POTASSIUM CHLORIDE 40 MEQ: 1500 TABLET, EXTENDED RELEASE ORAL at 14:18

## 2023-01-01 RX ADMIN — NOREPINEPHRINE BITARTRATE 0.07 MCG/KG/MIN: 0.02 INJECTION, SOLUTION INTRAVENOUS at 08:53

## 2023-01-01 RX ADMIN — DEXMEDETOMIDINE HYDROCHLORIDE 0.9 MCG/KG/HR: 400 INJECTION INTRAVENOUS at 03:25

## 2023-01-01 RX ADMIN — LORAZEPAM 2 MG: 1 TABLET ORAL at 16:59

## 2023-01-01 RX ADMIN — FENTANYL CITRATE 50 MCG: 50 INJECTION, SOLUTION INTRAMUSCULAR; INTRAVENOUS at 21:55

## 2023-01-01 RX ADMIN — LACTULOSE 20 G: 10 SOLUTION ORAL at 12:12

## 2023-01-01 RX ADMIN — DEXMEDETOMIDINE HYDROCHLORIDE 0.8 MCG/KG/HR: 400 INJECTION INTRAVENOUS at 19:57

## 2023-01-01 RX ADMIN — PANTOPRAZOLE SODIUM 40 MG: 40 TABLET, DELAYED RELEASE ORAL at 12:48

## 2023-01-01 RX ADMIN — INSULIN ASPART 2 UNITS: 100 INJECTION, SOLUTION INTRAVENOUS; SUBCUTANEOUS at 08:20

## 2023-01-01 RX ADMIN — CEFAZOLIN SODIUM 2 G: 2 INJECTION, SOLUTION INTRAVENOUS at 23:41

## 2023-01-01 RX ADMIN — CEFAZOLIN SODIUM 2 G: 2 INJECTION, SOLUTION INTRAVENOUS at 23:28

## 2023-01-01 RX ADMIN — MICONAZOLE NITRATE ANTIFUNGAL POWDER: 2 POWDER TOPICAL at 20:41

## 2023-01-01 RX ADMIN — FENTANYL CITRATE 100 MCG: 50 INJECTION, SOLUTION INTRAMUSCULAR; INTRAVENOUS at 11:10

## 2023-01-01 RX ADMIN — MICONAZOLE NITRATE ANTIFUNGAL POWDER: 2 POWDER TOPICAL at 20:31

## 2023-01-01 RX ADMIN — PANTOPRAZOLE SODIUM 40 MG: 40 TABLET, DELAYED RELEASE ORAL at 08:31

## 2023-01-01 RX ADMIN — NOREPINEPHRINE BITARTRATE 0.03 MCG/KG/MIN: 0.02 INJECTION, SOLUTION INTRAVENOUS at 00:46

## 2023-01-01 RX ADMIN — OXYCODONE HYDROCHLORIDE 10 MG: 5 TABLET ORAL at 07:59

## 2023-01-01 RX ADMIN — INSULIN ASPART 2 UNITS: 100 INJECTION, SOLUTION INTRAVENOUS; SUBCUTANEOUS at 12:16

## 2023-01-01 RX ADMIN — Medication 1 TABLET: at 12:44

## 2023-01-01 RX ADMIN — INSULIN ASPART 4 UNITS: 100 INJECTION, SOLUTION INTRAVENOUS; SUBCUTANEOUS at 21:18

## 2023-01-01 RX ADMIN — LACTULOSE 20 G: 10 SOLUTION ORAL at 08:03

## 2023-01-01 RX ADMIN — CEFAZOLIN SODIUM 2 G: 2 INJECTION, SOLUTION INTRAVENOUS at 09:17

## 2023-01-01 RX ADMIN — ENOXAPARIN SODIUM 40 MG: 40 INJECTION SUBCUTANEOUS at 16:31

## 2023-01-01 RX ADMIN — LAMOTRIGINE 200 MG: 200 TABLET ORAL at 08:12

## 2023-01-01 RX ADMIN — GADOBUTROL 9.5 ML: 604.72 INJECTION INTRAVENOUS at 20:13

## 2023-01-01 RX ADMIN — DEXMEDETOMIDINE HYDROCHLORIDE 0.8 MCG/KG/HR: 400 INJECTION INTRAVENOUS at 00:31

## 2023-01-01 RX ADMIN — ALBUMIN HUMAN 12.5 G: 0.05 INJECTION, SOLUTION INTRAVENOUS at 05:54

## 2023-01-01 RX ADMIN — DEXMEDETOMIDINE HYDROCHLORIDE 1.1 MCG/KG/HR: 400 INJECTION INTRAVENOUS at 23:04

## 2023-01-01 RX ADMIN — LAMOTRIGINE 200 MG: 200 TABLET ORAL at 08:29

## 2023-01-01 RX ADMIN — CEFAZOLIN SODIUM 2 G: 2 INJECTION, SOLUTION INTRAVENOUS at 07:53

## 2023-01-01 RX ADMIN — CEFAZOLIN SODIUM 2 G: 2 INJECTION, SOLUTION INTRAVENOUS at 22:30

## 2023-01-01 RX ADMIN — ALBUTEROL SULFATE 2.5 MG: 2.5 SOLUTION RESPIRATORY (INHALATION) at 07:15

## 2023-01-01 RX ADMIN — INSULIN ASPART 2 UNITS: 100 INJECTION, SOLUTION INTRAVENOUS; SUBCUTANEOUS at 08:21

## 2023-01-01 RX ADMIN — OXYCODONE HYDROCHLORIDE 5 MG: 5 TABLET ORAL at 19:59

## 2023-01-01 RX ADMIN — ALBUTEROL SULFATE 2.5 MG: 2.5 SOLUTION RESPIRATORY (INHALATION) at 13:24

## 2023-01-01 RX ADMIN — SODIUM CHLORIDE 1000 ML: 9 INJECTION, SOLUTION INTRAVENOUS at 12:46

## 2023-01-01 RX ADMIN — Medication 15 ML: at 08:12

## 2023-01-01 RX ADMIN — LISINOPRIL 2.5 MG: 2.5 TABLET ORAL at 08:29

## 2023-01-01 RX ADMIN — LISINOPRIL 2.5 MG: 2.5 TABLET ORAL at 11:04

## 2023-01-01 RX ADMIN — CEFAZOLIN SODIUM 2 G: 2 INJECTION, SOLUTION INTRAVENOUS at 14:15

## 2023-01-01 RX ADMIN — HYDROMORPHONE HYDROCHLORIDE 0.5 MG: 0.2 INJECTION, SOLUTION INTRAMUSCULAR; INTRAVENOUS; SUBCUTANEOUS at 12:04

## 2023-01-01 RX ADMIN — INSULIN ASPART 3 UNITS: 100 INJECTION, SOLUTION INTRAVENOUS; SUBCUTANEOUS at 17:10

## 2023-01-01 RX ADMIN — ALBUTEROL SULFATE 2.5 MG: 2.5 SOLUTION RESPIRATORY (INHALATION) at 19:50

## 2023-01-01 RX ADMIN — MICONAZOLE NITRATE ANTIFUNGAL POWDER: 2 POWDER TOPICAL at 20:19

## 2023-01-01 RX ADMIN — NOREPINEPHRINE BITARTRATE 0.07 MCG/KG/MIN: 1 INJECTION, SOLUTION, CONCENTRATE INTRAVENOUS at 09:55

## 2023-01-01 RX ADMIN — ALBUTEROL SULFATE 2.5 MG: 2.5 SOLUTION RESPIRATORY (INHALATION) at 02:47

## 2023-01-01 RX ADMIN — NOREPINEPHRINE BITARTRATE 0.08 MCG/KG/MIN: 0.02 INJECTION, SOLUTION INTRAVENOUS at 23:02

## 2023-01-01 RX ADMIN — LIDOCAINE HYDROCHLORIDE 5 ML: 10 INJECTION, SOLUTION INFILTRATION; PERINEURAL at 15:53

## 2023-01-01 RX ADMIN — ENOXAPARIN SODIUM 40 MG: 40 INJECTION SUBCUTANEOUS at 16:41

## 2023-01-01 RX ADMIN — CEFAZOLIN SODIUM 2 G: 2 INJECTION, SOLUTION INTRAVENOUS at 08:52

## 2023-01-01 RX ADMIN — Medication 1 TABLET: at 13:19

## 2023-01-01 RX ADMIN — INSULIN ASPART 3 UNITS: 100 INJECTION, SOLUTION INTRAVENOUS; SUBCUTANEOUS at 22:30

## 2023-01-01 RX ADMIN — POLYETHYLENE GLYCOL 3350 17 G: 17 POWDER, FOR SOLUTION ORAL at 08:22

## 2023-01-01 RX ADMIN — DORNASE ALFA 50 ML: 1 SOLUTION RESPIRATORY (INHALATION) at 08:22

## 2023-01-01 RX ADMIN — FENTANYL CITRATE 50 MCG: 50 INJECTION, SOLUTION INTRAMUSCULAR; INTRAVENOUS at 09:16

## 2023-01-01 RX ADMIN — SODIUM CHLORIDE, POTASSIUM CHLORIDE, SODIUM LACTATE AND CALCIUM CHLORIDE 1000 ML: 600; 310; 30; 20 INJECTION, SOLUTION INTRAVENOUS at 17:31

## 2023-01-01 RX ADMIN — PANTOPRAZOLE SODIUM 40 MG: 40 TABLET, DELAYED RELEASE ORAL at 06:38

## 2023-01-01 RX ADMIN — OXYCODONE HYDROCHLORIDE 10 MG: 5 TABLET ORAL at 01:37

## 2023-01-01 RX ADMIN — Medication 15 ML: at 08:21

## 2023-01-01 RX ADMIN — ONDANSETRON 4 MG: 2 INJECTION INTRAMUSCULAR; INTRAVENOUS at 04:29

## 2023-01-01 RX ADMIN — CEFAZOLIN SODIUM 2 G: 2 INJECTION, SOLUTION INTRAVENOUS at 15:27

## 2023-01-01 RX ADMIN — OXYCODONE HYDROCHLORIDE 10 MG: 5 TABLET ORAL at 23:26

## 2023-01-01 RX ADMIN — POTASSIUM CHLORIDE 40 MEQ: 1500 TABLET, EXTENDED RELEASE ORAL at 08:48

## 2023-01-01 RX ADMIN — ACETAMINOPHEN 500 MG: 500 TABLET ORAL at 12:43

## 2023-01-01 RX ADMIN — Medication 1 TABLET: at 11:14

## 2023-01-01 RX ADMIN — DEXMEDETOMIDINE HYDROCHLORIDE 0.6 MCG/KG/HR: 400 INJECTION INTRAVENOUS at 12:38

## 2023-01-01 RX ADMIN — ENOXAPARIN SODIUM 40 MG: 40 INJECTION SUBCUTANEOUS at 16:45

## 2023-01-01 RX ADMIN — OXYCODONE HYDROCHLORIDE 10 MG: 5 TABLET ORAL at 19:04

## 2023-01-01 RX ADMIN — CEFAZOLIN SODIUM 2 G: 2 INJECTION, SOLUTION INTRAVENOUS at 14:39

## 2023-01-01 RX ADMIN — CEFAZOLIN SODIUM 2 G: 2 INJECTION, SOLUTION INTRAVENOUS at 08:21

## 2023-01-01 RX ADMIN — FENTANYL CITRATE 50 MCG: 50 INJECTION, SOLUTION INTRAMUSCULAR; INTRAVENOUS at 09:20

## 2023-01-01 RX ADMIN — LIDOCAINE HYDROCHLORIDE 2.5 ML: 10 INJECTION, SOLUTION EPIDURAL; INFILTRATION; INTRACAUDAL; PERINEURAL at 10:49

## 2023-01-01 RX ADMIN — ENOXAPARIN SODIUM 40 MG: 40 INJECTION SUBCUTANEOUS at 15:30

## 2023-01-01 RX ADMIN — OXYCODONE HYDROCHLORIDE 5 MG: 5 TABLET ORAL at 12:12

## 2023-01-01 RX ADMIN — ENOXAPARIN SODIUM 40 MG: 40 INJECTION SUBCUTANEOUS at 08:42

## 2023-01-01 RX ADMIN — SODIUM CHLORIDE, POTASSIUM CHLORIDE, SODIUM LACTATE AND CALCIUM CHLORIDE 500 ML: 600; 310; 30; 20 INJECTION, SOLUTION INTRAVENOUS at 11:05

## 2023-01-01 RX ADMIN — TOPICAL ANESTHETIC 0.5 EACH: 200 SPRAY DENTAL; PERIODONTAL at 09:33

## 2023-01-01 RX ADMIN — INSULIN ASPART 1 UNITS: 100 INJECTION, SOLUTION INTRAVENOUS; SUBCUTANEOUS at 08:02

## 2023-01-01 RX ADMIN — INSULIN ASPART 4 UNITS: 100 INJECTION, SOLUTION INTRAVENOUS; SUBCUTANEOUS at 04:11

## 2023-01-01 RX ADMIN — ACETAMINOPHEN 500 MG: 500 TABLET ORAL at 21:18

## 2023-01-01 RX ADMIN — INSULIN ASPART 4 UNITS: 100 INJECTION, SOLUTION INTRAVENOUS; SUBCUTANEOUS at 20:15

## 2023-01-01 RX ADMIN — OXYCODONE HYDROCHLORIDE 10 MG: 5 TABLET ORAL at 13:15

## 2023-01-01 RX ADMIN — Medication 1 TABLET: at 15:30

## 2023-01-01 RX ADMIN — DEXMEDETOMIDINE HYDROCHLORIDE 0.4 MCG/KG/HR: 400 INJECTION INTRAVENOUS at 19:30

## 2023-01-01 RX ADMIN — INSULIN ASPART 4 UNITS: 100 INJECTION, SOLUTION INTRAVENOUS; SUBCUTANEOUS at 01:06

## 2023-01-01 RX ADMIN — LEVOFLOXACIN 500 MG: 500 INJECTION, SOLUTION INTRAVENOUS at 12:57

## 2023-01-01 RX ADMIN — LISINOPRIL 2.5 MG: 2.5 TABLET ORAL at 09:10

## 2023-01-01 RX ADMIN — LAMOTRIGINE 200 MG: 200 TABLET ORAL at 20:28

## 2023-01-01 RX ADMIN — ENOXAPARIN SODIUM 40 MG: 40 INJECTION SUBCUTANEOUS at 16:42

## 2023-01-01 RX ADMIN — LAMOTRIGINE 200 MG: 200 TABLET ORAL at 08:21

## 2023-01-01 RX ADMIN — OLANZAPINE 2.5 MG: 2.5 TABLET, FILM COATED ORAL at 22:29

## 2023-01-01 RX ADMIN — MICONAZOLE NITRATE ANTIFUNGAL POWDER: 2 POWDER TOPICAL at 21:21

## 2023-01-01 RX ADMIN — CEFAZOLIN SODIUM 2 G: 2 INJECTION, SOLUTION INTRAVENOUS at 09:44

## 2023-01-01 RX ADMIN — CEFAZOLIN SODIUM 2 G: 2 INJECTION, SOLUTION INTRAVENOUS at 15:29

## 2023-01-01 RX ADMIN — OXYCODONE HYDROCHLORIDE 5 MG: 5 TABLET ORAL at 08:55

## 2023-01-01 RX ADMIN — OXYCODONE HYDROCHLORIDE 10 MG: 5 TABLET ORAL at 01:29

## 2023-01-01 RX ADMIN — LAMOTRIGINE 200 MG: 200 TABLET ORAL at 20:15

## 2023-01-01 RX ADMIN — OXYCODONE HYDROCHLORIDE 10 MG: 5 TABLET ORAL at 08:13

## 2023-01-01 RX ADMIN — ENOXAPARIN SODIUM 40 MG: 40 INJECTION SUBCUTANEOUS at 16:57

## 2023-01-01 RX ADMIN — CALCIUM GLUCONATE 1 G: 20 INJECTION, SOLUTION INTRAVENOUS at 08:55

## 2023-01-01 RX ADMIN — LAMOTRIGINE 200 MG: 200 TABLET ORAL at 10:34

## 2023-01-01 RX ADMIN — DEXMEDETOMIDINE HYDROCHLORIDE 1 MCG/KG/HR: 400 INJECTION INTRAVENOUS at 21:25

## 2023-01-01 RX ADMIN — ALBUMIN HUMAN 12.5 G: 0.05 INJECTION, SOLUTION INTRAVENOUS at 14:43

## 2023-01-01 RX ADMIN — OXYCODONE HYDROCHLORIDE 10 MG: 5 TABLET ORAL at 16:57

## 2023-01-01 RX ADMIN — OXYCODONE HYDROCHLORIDE 10 MG: 5 TABLET ORAL at 00:20

## 2023-01-01 RX ADMIN — OXYCODONE HYDROCHLORIDE 10 MG: 5 TABLET ORAL at 07:57

## 2023-01-01 RX ADMIN — MICONAZOLE NITRATE ANTIFUNGAL POWDER: 2 POWDER TOPICAL at 21:45

## 2023-01-01 RX ADMIN — FENTANYL CITRATE 100 MCG: 50 INJECTION, SOLUTION INTRAMUSCULAR; INTRAVENOUS at 17:03

## 2023-01-01 RX ADMIN — ENOXAPARIN SODIUM 40 MG: 40 INJECTION SUBCUTANEOUS at 07:57

## 2023-01-01 RX ADMIN — MICONAZOLE NITRATE ANTIFUNGAL POWDER: 2 POWDER TOPICAL at 21:20

## 2023-01-01 RX ADMIN — MIDAZOLAM 1 MG: 1 INJECTION INTRAMUSCULAR; INTRAVENOUS at 09:17

## 2023-01-01 RX ADMIN — INSULIN GLARGINE 42 UNITS: 100 INJECTION, SOLUTION SUBCUTANEOUS at 21:07

## 2023-01-01 RX ADMIN — CEFAZOLIN SODIUM 2 G: 2 INJECTION, SOLUTION INTRAVENOUS at 23:29

## 2023-01-01 RX ADMIN — LISINOPRIL 2.5 MG: 2.5 TABLET ORAL at 08:28

## 2023-01-01 RX ADMIN — OXYCODONE HYDROCHLORIDE 5 MG: 5 TABLET ORAL at 15:20

## 2023-01-01 RX ADMIN — MICONAZOLE NITRATE ANTIFUNGAL POWDER: 2 POWDER TOPICAL at 08:43

## 2023-01-01 RX ADMIN — PANTOPRAZOLE SODIUM 40 MG: 40 TABLET, DELAYED RELEASE ORAL at 06:08

## 2023-01-01 RX ADMIN — LEVOTHYROXINE SODIUM 137 MCG: 0.14 TABLET ORAL at 08:31

## 2023-01-01 RX ADMIN — ACETAMINOPHEN 500 MG: 500 TABLET ORAL at 08:49

## 2023-01-01 RX ADMIN — Medication 40 MG: at 06:58

## 2023-01-01 RX ADMIN — POLYETHYLENE GLYCOL 3350 17 G: 17 POWDER, FOR SOLUTION ORAL at 10:34

## 2023-01-01 RX ADMIN — INSULIN ASPART 1 UNITS: 100 INJECTION, SOLUTION INTRAVENOUS; SUBCUTANEOUS at 08:45

## 2023-01-01 RX ADMIN — OLANZAPINE 2.5 MG: 2.5 TABLET, FILM COATED ORAL at 21:08

## 2023-01-01 RX ADMIN — CEFAZOLIN SODIUM 2 G: 2 INJECTION, SOLUTION INTRAVENOUS at 05:36

## 2023-01-01 RX ADMIN — INSULIN ASPART 2 UNITS: 100 INJECTION, SOLUTION INTRAVENOUS; SUBCUTANEOUS at 00:37

## 2023-01-01 RX ADMIN — LAMOTRIGINE 200 MG: 200 TABLET ORAL at 21:12

## 2023-01-01 RX ADMIN — LAMOTRIGINE 200 MG: 200 TABLET ORAL at 08:14

## 2023-01-01 RX ADMIN — MICONAZOLE NITRATE ANTIFUNGAL POWDER: 2 POWDER TOPICAL at 08:15

## 2023-01-01 RX ADMIN — FUROSEMIDE 20 MG: 10 INJECTION, SOLUTION INTRAMUSCULAR; INTRAVENOUS at 02:59

## 2023-01-01 RX ADMIN — MIDAZOLAM 1 MG: 1 INJECTION INTRAMUSCULAR; INTRAVENOUS at 09:32

## 2023-01-01 RX ADMIN — OLANZAPINE 2.5 MG: 5 TABLET, FILM COATED ORAL at 20:20

## 2023-01-01 RX ADMIN — CEFAZOLIN SODIUM 2 G: 2 INJECTION, SOLUTION INTRAVENOUS at 16:39

## 2023-01-01 RX ADMIN — LORAZEPAM 2 MG: 1 TABLET ORAL at 09:02

## 2023-01-01 RX ADMIN — LISINOPRIL 2.5 MG: 2.5 TABLET ORAL at 09:17

## 2023-01-01 RX ADMIN — PANTOPRAZOLE SODIUM 40 MG: 40 TABLET, DELAYED RELEASE ORAL at 07:59

## 2023-01-01 RX ADMIN — FENTANYL CITRATE 50 MCG: 50 INJECTION, SOLUTION INTRAMUSCULAR; INTRAVENOUS at 03:14

## 2023-01-01 RX ADMIN — ACETAMINOPHEN 500 MG: 500 TABLET ORAL at 11:04

## 2023-01-01 RX ADMIN — SODIUM CHLORIDE, POTASSIUM CHLORIDE, SODIUM LACTATE AND CALCIUM CHLORIDE: 600; 310; 30; 20 INJECTION, SOLUTION INTRAVENOUS at 18:48

## 2023-01-01 RX ADMIN — Medication 1 TABLET: at 12:54

## 2023-01-01 RX ADMIN — DEXMEDETOMIDINE HYDROCHLORIDE 0.8 MCG/KG/HR: 400 INJECTION INTRAVENOUS at 16:16

## 2023-01-01 RX ADMIN — OLANZAPINE 2.5 MG: 2.5 TABLET, FILM COATED ORAL at 20:54

## 2023-01-01 RX ADMIN — LEVOTHYROXINE SODIUM 137 MCG: 0.14 TABLET ORAL at 06:08

## 2023-01-01 RX ADMIN — LAMOTRIGINE 200 MG: 200 TABLET ORAL at 21:45

## 2023-01-01 RX ADMIN — MICONAZOLE NITRATE ANTIFUNGAL POWDER: 2 POWDER TOPICAL at 08:03

## 2023-01-01 RX ADMIN — NOREPINEPHRINE BITARTRATE 0.03 MCG/KG/MIN: 0.02 INJECTION, SOLUTION INTRAVENOUS at 13:22

## 2023-01-01 RX ADMIN — PANTOPRAZOLE SODIUM 40 MG: 40 TABLET, DELAYED RELEASE ORAL at 06:30

## 2023-01-01 RX ADMIN — CEFAZOLIN SODIUM 2 G: 2 INJECTION, SOLUTION INTRAVENOUS at 08:27

## 2023-01-01 RX ADMIN — DEXTROSE MONOHYDRATE: 50 INJECTION, SOLUTION INTRAVENOUS at 04:41

## 2023-01-01 RX ADMIN — LAMOTRIGINE 200 MG: 200 TABLET ORAL at 20:00

## 2023-01-01 RX ADMIN — OXYCODONE HYDROCHLORIDE 5 MG: 5 TABLET ORAL at 04:01

## 2023-01-01 RX ADMIN — INSULIN ASPART 2 UNITS: 100 INJECTION, SOLUTION INTRAVENOUS; SUBCUTANEOUS at 20:50

## 2023-01-01 RX ADMIN — INSULIN ASPART 3 UNITS: 100 INJECTION, SOLUTION INTRAVENOUS; SUBCUTANEOUS at 08:07

## 2023-01-01 RX ADMIN — HYDROMORPHONE HYDROCHLORIDE 0.4 MG: 0.2 INJECTION, SOLUTION INTRAMUSCULAR; INTRAVENOUS; SUBCUTANEOUS at 13:18

## 2023-01-01 RX ADMIN — MICONAZOLE NITRATE ANTIFUNGAL POWDER: 2 POWDER TOPICAL at 22:09

## 2023-01-01 RX ADMIN — OXYCODONE HYDROCHLORIDE 5 MG: 5 TABLET ORAL at 10:20

## 2023-01-01 RX ADMIN — INSULIN GLARGINE 17 UNITS: 100 INJECTION, SOLUTION SUBCUTANEOUS at 17:31

## 2023-01-01 RX ADMIN — FENTANYL CITRATE 100 MCG: 50 INJECTION, SOLUTION INTRAMUSCULAR; INTRAVENOUS at 15:47

## 2023-01-01 RX ADMIN — Medication 40 MG: at 06:01

## 2023-01-01 RX ADMIN — CEFAZOLIN SODIUM 2 G: 2 INJECTION, SOLUTION INTRAVENOUS at 16:28

## 2023-01-01 RX ADMIN — SODIUM CHLORIDE 500 ML: 9 INJECTION, SOLUTION INTRAVENOUS at 00:19

## 2023-01-01 RX ADMIN — DEXTROSE MONOHYDRATE: 50 INJECTION, SOLUTION INTRAVENOUS at 17:41

## 2023-01-01 RX ADMIN — OXYCODONE HYDROCHLORIDE 5 MG: 5 TABLET ORAL at 16:43

## 2023-01-01 RX ADMIN — INSULIN ASPART 3 UNITS: 100 INJECTION, SOLUTION INTRAVENOUS; SUBCUTANEOUS at 20:54

## 2023-01-01 RX ADMIN — ACETAMINOPHEN 500 MG: 500 TABLET ORAL at 09:39

## 2023-01-01 RX ADMIN — FENTANYL CITRATE 100 MCG: 50 INJECTION, SOLUTION INTRAMUSCULAR; INTRAVENOUS at 04:34

## 2023-01-01 RX ADMIN — FENTANYL CITRATE 100 MCG: 50 INJECTION, SOLUTION INTRAMUSCULAR; INTRAVENOUS at 12:42

## 2023-01-01 RX ADMIN — ANORECTAL OINTMENT: 15.7; .44; 24; 20.6 OINTMENT TOPICAL at 03:27

## 2023-01-01 RX ADMIN — PANTOPRAZOLE SODIUM 40 MG: 40 TABLET, DELAYED RELEASE ORAL at 06:13

## 2023-01-01 RX ADMIN — MICONAZOLE NITRATE ANTIFUNGAL POWDER: 2 POWDER TOPICAL at 09:17

## 2023-01-01 RX ADMIN — MICONAZOLE NITRATE ANTIFUNGAL POWDER: 2 POWDER TOPICAL at 21:08

## 2023-01-01 RX ADMIN — DEXMEDETOMIDINE HYDROCHLORIDE 1 MCG/KG/HR: 400 INJECTION INTRAVENOUS at 07:03

## 2023-01-01 RX ADMIN — OLANZAPINE 2.5 MG: 2.5 TABLET, FILM COATED ORAL at 20:27

## 2023-01-01 RX ADMIN — INSULIN ASPART 6 UNITS: 100 INJECTION, SOLUTION INTRAVENOUS; SUBCUTANEOUS at 04:08

## 2023-01-01 RX ADMIN — DEXMEDETOMIDINE HYDROCHLORIDE 1 MCG/KG/HR: 400 INJECTION INTRAVENOUS at 16:28

## 2023-01-01 RX ADMIN — CEFAZOLIN SODIUM 2 G: 2 INJECTION, SOLUTION INTRAVENOUS at 00:33

## 2023-01-01 RX ADMIN — FUROSEMIDE 40 MG: 20 TABLET ORAL at 09:37

## 2023-01-01 RX ADMIN — LAMOTRIGINE 200 MG: 200 TABLET ORAL at 20:36

## 2023-01-01 RX ADMIN — MICONAZOLE NITRATE ANTIFUNGAL POWDER: 2 POWDER TOPICAL at 08:57

## 2023-01-01 RX ADMIN — CEFAZOLIN SODIUM 2 G: 2 INJECTION, SOLUTION INTRAVENOUS at 08:14

## 2023-01-01 RX ADMIN — INSULIN ASPART 3 UNITS: 100 INJECTION, SOLUTION INTRAVENOUS; SUBCUTANEOUS at 07:50

## 2023-01-01 RX ADMIN — DEXTROSE MONOHYDRATE: 50 INJECTION, SOLUTION INTRAVENOUS at 05:27

## 2023-01-01 RX ADMIN — POTASSIUM CHLORIDE 40 MEQ: 1500 TABLET, EXTENDED RELEASE ORAL at 11:14

## 2023-01-01 RX ADMIN — DEXMEDETOMIDINE HYDROCHLORIDE 0.9 MCG/KG/HR: 400 INJECTION INTRAVENOUS at 21:30

## 2023-01-01 RX ADMIN — ALBUMIN HUMAN 12.5 G: 0.05 INJECTION, SOLUTION INTRAVENOUS at 14:23

## 2023-01-01 RX ADMIN — HYDROMORPHONE HYDROCHLORIDE 0.4 MG: 0.2 INJECTION, SOLUTION INTRAMUSCULAR; INTRAVENOUS; SUBCUTANEOUS at 22:21

## 2023-01-01 RX ADMIN — MICONAZOLE NITRATE ANTIFUNGAL POWDER: 2 POWDER TOPICAL at 09:11

## 2023-01-01 RX ADMIN — FENTANYL CITRATE 50 MCG: 50 INJECTION, SOLUTION INTRAMUSCULAR; INTRAVENOUS at 15:57

## 2023-01-01 RX ADMIN — LAMOTRIGINE 200 MG: 200 TABLET ORAL at 12:38

## 2023-01-01 RX ADMIN — OXYCODONE HYDROCHLORIDE 10 MG: 5 TABLET ORAL at 02:00

## 2023-01-01 RX ADMIN — LAMOTRIGINE 200 MG: 200 TABLET ORAL at 22:29

## 2023-01-01 RX ADMIN — IOPAMIDOL 75 ML: 755 INJECTION, SOLUTION INTRAVENOUS at 16:11

## 2023-01-01 RX ADMIN — MICONAZOLE NITRATE ANTIFUNGAL POWDER: 2 POWDER TOPICAL at 21:38

## 2023-01-01 RX ADMIN — LAMOTRIGINE 200 MG: 200 TABLET ORAL at 20:19

## 2023-01-01 RX ADMIN — DEXTROSE MONOHYDRATE: 50 INJECTION, SOLUTION INTRAVENOUS at 21:07

## 2023-01-01 RX ADMIN — INSULIN ASPART 5 UNITS: 100 INJECTION, SOLUTION INTRAVENOUS; SUBCUTANEOUS at 23:32

## 2023-01-01 RX ADMIN — LIDOCAINE HYDROCHLORIDE: 20 JELLY TOPICAL at 06:20

## 2023-01-01 RX ADMIN — MICONAZOLE NITRATE ANTIFUNGAL POWDER: 2 POWDER TOPICAL at 10:35

## 2023-01-01 RX ADMIN — SODIUM CHLORIDE, POTASSIUM CHLORIDE, SODIUM LACTATE AND CALCIUM CHLORIDE 1000 ML: 600; 310; 30; 20 INJECTION, SOLUTION INTRAVENOUS at 21:43

## 2023-01-01 RX ADMIN — CEFAZOLIN SODIUM 2 G: 2 INJECTION, SOLUTION INTRAVENOUS at 00:31

## 2023-01-01 RX ADMIN — LAMOTRIGINE 200 MG: 200 TABLET ORAL at 08:54

## 2023-01-01 RX ADMIN — OXYCODONE HYDROCHLORIDE 10 MG: 5 TABLET ORAL at 02:15

## 2023-01-01 RX ADMIN — ALBUMIN HUMAN 12.5 G: 0.05 INJECTION, SOLUTION INTRAVENOUS at 22:26

## 2023-01-01 RX ADMIN — HYDROMORPHONE HYDROCHLORIDE 0.4 MG: 0.2 INJECTION, SOLUTION INTRAMUSCULAR; INTRAVENOUS; SUBCUTANEOUS at 08:12

## 2023-01-01 RX ADMIN — OXYCODONE HYDROCHLORIDE 5 MG: 5 TABLET ORAL at 21:30

## 2023-01-01 RX ADMIN — INSULIN ASPART 2 UNITS: 100 INJECTION, SOLUTION INTRAVENOUS; SUBCUTANEOUS at 11:58

## 2023-01-01 RX ADMIN — ACETAMINOPHEN 500 MG: 500 TABLET ORAL at 10:20

## 2023-01-01 RX ADMIN — INSULIN ASPART 1 UNITS: 100 INJECTION, SOLUTION INTRAVENOUS; SUBCUTANEOUS at 12:53

## 2023-01-01 RX ADMIN — ALBUMIN (HUMAN) 250 ML: 12.5 SOLUTION INTRAVENOUS at 00:51

## 2023-01-01 RX ADMIN — MICONAZOLE NITRATE ANTIFUNGAL POWDER 1 APPLICATOR: 2 POWDER TOPICAL at 08:22

## 2023-01-01 RX ADMIN — LEVOTHYROXINE SODIUM 137 MCG: 0.14 TABLET ORAL at 06:58

## 2023-01-01 RX ADMIN — ACETAMINOPHEN 500 MG: 500 TABLET ORAL at 16:41

## 2023-01-01 RX ADMIN — FENTANYL CITRATE 100 MCG: 50 INJECTION, SOLUTION INTRAMUSCULAR; INTRAVENOUS at 09:53

## 2023-01-01 RX ADMIN — Medication 15 ML: at 08:03

## 2023-01-01 RX ADMIN — OXYCODONE HYDROCHLORIDE 10 MG: 5 TABLET ORAL at 12:04

## 2023-01-01 RX ADMIN — ACETAMINOPHEN 500 MG: 500 TABLET ORAL at 02:59

## 2023-01-01 RX ADMIN — MICONAZOLE NITRATE ANTIFUNGAL POWDER: 2 POWDER TOPICAL at 22:32

## 2023-01-01 RX ADMIN — POLYETHYLENE GLYCOL 3350 17 G: 17 POWDER, FOR SOLUTION ORAL at 08:54

## 2023-01-01 RX ADMIN — ENOXAPARIN SODIUM 40 MG: 40 INJECTION SUBCUTANEOUS at 08:22

## 2023-01-01 RX ADMIN — CEFAZOLIN 1 G: 1 INJECTION, POWDER, FOR SOLUTION INTRAMUSCULAR; INTRAVENOUS at 13:17

## 2023-01-01 RX ADMIN — DEXMEDETOMIDINE HYDROCHLORIDE 1 MCG/KG/HR: 400 INJECTION INTRAVENOUS at 05:07

## 2023-01-01 RX ADMIN — LEVOTHYROXINE SODIUM 137 MCG: 0.14 TABLET ORAL at 12:50

## 2023-01-01 RX ADMIN — CEFAZOLIN SODIUM 2 G: 2 INJECTION, SOLUTION INTRAVENOUS at 14:41

## 2023-01-01 RX ADMIN — INSULIN ASPART 2 UNITS: 100 INJECTION, SOLUTION INTRAVENOUS; SUBCUTANEOUS at 12:15

## 2023-01-01 RX ADMIN — LORAZEPAM 1 MG: 1 TABLET ORAL at 14:41

## 2023-01-01 RX ADMIN — INSULIN ASPART 2 UNITS: 100 INJECTION, SOLUTION INTRAVENOUS; SUBCUTANEOUS at 00:00

## 2023-01-01 RX ADMIN — LAMOTRIGINE 200 MG: 200 TABLET ORAL at 20:38

## 2023-01-01 RX ADMIN — FENTANYL CITRATE 50 MCG: 50 INJECTION, SOLUTION INTRAMUSCULAR; INTRAVENOUS at 01:55

## 2023-01-01 RX ADMIN — Medication 1 TABLET: at 12:06

## 2023-01-01 RX ADMIN — Medication 1 TABLET: at 14:00

## 2023-01-01 RX ADMIN — DEXMEDETOMIDINE HYDROCHLORIDE 0.8 MCG/KG/HR: 400 INJECTION INTRAVENOUS at 07:46

## 2023-01-01 RX ADMIN — MICONAZOLE NITRATE ANTIFUNGAL POWDER: 2 POWDER TOPICAL at 11:58

## 2023-01-01 RX ADMIN — LAMOTRIGINE 200 MG: 200 TABLET ORAL at 08:00

## 2023-01-01 RX ADMIN — DEXMEDETOMIDINE HYDROCHLORIDE 1 MCG/KG/HR: 400 INJECTION INTRAVENOUS at 11:53

## 2023-01-01 RX ADMIN — SODIUM CHLORIDE, POTASSIUM CHLORIDE, SODIUM LACTATE AND CALCIUM CHLORIDE: 600; 310; 30; 20 INJECTION, SOLUTION INTRAVENOUS at 21:25

## 2023-01-01 RX ADMIN — Medication 1 TABLET: at 12:34

## 2023-01-01 RX ADMIN — Medication 1 TABLET: at 12:57

## 2023-01-01 RX ADMIN — CEFAZOLIN 1 G: 1 INJECTION, POWDER, FOR SOLUTION INTRAMUSCULAR; INTRAVENOUS at 21:18

## 2023-01-01 RX ADMIN — POLYETHYLENE GLYCOL 3350 17 G: 17 POWDER, FOR SOLUTION ORAL at 09:14

## 2023-01-01 RX ADMIN — INSULIN ASPART 1 UNITS: 100 INJECTION, SOLUTION INTRAVENOUS; SUBCUTANEOUS at 04:27

## 2023-01-01 RX ADMIN — DORNASE ALFA 50 ML: 1 SOLUTION RESPIRATORY (INHALATION) at 19:57

## 2023-01-01 RX ADMIN — LEVOTHYROXINE SODIUM 137 MCG: 0.14 TABLET ORAL at 12:11

## 2023-01-01 RX ADMIN — POTASSIUM CHLORIDE 40 MEQ: 1500 TABLET, EXTENDED RELEASE ORAL at 13:15

## 2023-01-01 RX ADMIN — CEFAZOLIN SODIUM 2 G: 2 INJECTION, SOLUTION INTRAVENOUS at 17:09

## 2023-01-01 RX ADMIN — INSULIN ASPART 4 UNITS: 100 INJECTION, SOLUTION INTRAVENOUS; SUBCUTANEOUS at 07:57

## 2023-01-01 RX ADMIN — DEXTROSE MONOHYDRATE: 50 INJECTION, SOLUTION INTRAVENOUS at 11:43

## 2023-01-01 RX ADMIN — CEFAZOLIN SODIUM 2 G: 2 INJECTION, SOLUTION INTRAVENOUS at 16:10

## 2023-01-01 RX ADMIN — ENOXAPARIN SODIUM 40 MG: 40 INJECTION SUBCUTANEOUS at 08:14

## 2023-01-01 RX ADMIN — POLYETHYLENE GLYCOL 3350 17 G: 17 POWDER, FOR SOLUTION ORAL at 11:04

## 2023-01-01 RX ADMIN — LEVOTHYROXINE SODIUM 137 MCG: 0.14 TABLET ORAL at 06:52

## 2023-01-01 RX ADMIN — ACETAMINOPHEN 500 MG: 500 TABLET ORAL at 04:12

## 2023-01-01 RX ADMIN — NOREPINEPHRINE BITARTRATE 0.05 MCG/KG/MIN: 0.02 INJECTION, SOLUTION INTRAVENOUS at 13:09

## 2023-01-01 RX ADMIN — CEFAZOLIN SODIUM 2 G: 2 INJECTION, SOLUTION INTRAVENOUS at 08:23

## 2023-01-01 RX ADMIN — CEFAZOLIN SODIUM 2 G: 2 INJECTION, SOLUTION INTRAVENOUS at 21:11

## 2023-01-01 RX ADMIN — DEXMEDETOMIDINE HYDROCHLORIDE 1 MCG/KG/HR: 400 INJECTION INTRAVENOUS at 13:28

## 2023-01-01 RX ADMIN — OXYCODONE HYDROCHLORIDE 10 MG: 5 TABLET ORAL at 11:27

## 2023-01-01 RX ADMIN — DEXTROSE MONOHYDRATE: 50 INJECTION, SOLUTION INTRAVENOUS at 17:55

## 2023-01-01 RX ADMIN — OXYCODONE HYDROCHLORIDE 5 MG: 5 TABLET ORAL at 00:11

## 2023-01-01 RX ADMIN — LEVOTHYROXINE SODIUM 137 MCG: 0.14 TABLET ORAL at 06:01

## 2023-01-01 RX ADMIN — INSULIN ASPART 7 UNITS: 100 INJECTION, SOLUTION INTRAVENOUS; SUBCUTANEOUS at 03:33

## 2023-01-01 RX ADMIN — LAMOTRIGINE 200 MG: 200 TABLET ORAL at 12:11

## 2023-01-01 RX ADMIN — LISINOPRIL 2.5 MG: 2.5 TABLET ORAL at 08:54

## 2023-01-01 RX ADMIN — CEFAZOLIN SODIUM 2 G: 2 INJECTION, SOLUTION INTRAVENOUS at 16:32

## 2023-01-01 RX ADMIN — CEFAZOLIN SODIUM 2 G: 2 INJECTION, SOLUTION INTRAVENOUS at 00:01

## 2023-01-01 ASSESSMENT — ACTIVITIES OF DAILY LIVING (ADL)
TRANSFERRING: 2-->COMPLETELY DEPENDENT
ADLS_ACUITY_SCORE: 56
ADLS_ACUITY_SCORE: 58
ADLS_ACUITY_SCORE: 56
ADLS_ACUITY_SCORE: 60
ADLS_ACUITY_SCORE: 58
ADLS_ACUITY_SCORE: 56
ADLS_ACUITY_SCORE: 56
DRESS: 2-->COMPLETELY DEPENDENT
ADLS_ACUITY_SCORE: 56
ADLS_ACUITY_SCORE: 58
WALKING_OR_CLIMBING_STAIRS_DIFFICULTY: YES
EQUIPMENT_CURRENTLY_USED_AT_HOME: WALKER, STANDARD
WEAR_GLASSES_OR_BLIND: NO
ADLS_ACUITY_SCORE: 58
ADLS_ACUITY_SCORE: 56
ADLS_ACUITY_SCORE: 56
ADLS_ACUITY_SCORE: 58
ADLS_ACUITY_SCORE: 58
ADLS_ACUITY_SCORE: 56
ADLS_ACUITY_SCORE: 58
ADLS_ACUITY_SCORE: 56
DRESSING/BATHING_DIFFICULTY: YES
ADLS_ACUITY_SCORE: 58
ADLS_ACUITY_SCORE: 52
ADLS_ACUITY_SCORE: 60
ADLS_ACUITY_SCORE: 58
ADLS_ACUITY_SCORE: 58
ADLS_ACUITY_SCORE: 56
ADLS_ACUITY_SCORE: 58
ADLS_ACUITY_SCORE: 56
ADLS_ACUITY_SCORE: 58
TOILETING_ISSUES: YES
ADLS_ACUITY_SCORE: 56
ADLS_ACUITY_SCORE: 52
ADLS_ACUITY_SCORE: 56
ADLS_ACUITY_SCORE: 52
ADLS_ACUITY_SCORE: 62
ADLS_ACUITY_SCORE: 62
ADLS_ACUITY_SCORE: 56
ADLS_ACUITY_SCORE: 56
ADLS_ACUITY_SCORE: 44
ADLS_ACUITY_SCORE: 56
WALKING_OR_CLIMBING_STAIRS: TRANSFERRING DIFFICULTY, DEPENDENT
ADLS_ACUITY_SCORE: 58
ADLS_ACUITY_SCORE: 56
ADLS_ACUITY_SCORE: 44
ADLS_ACUITY_SCORE: 56
TOILETING_ASSISTANCE: TOILETING DIFFICULTY, ASSISTANCE 1 PERSON
ADLS_ACUITY_SCORE: 58
ADLS_ACUITY_SCORE: 56
ADLS_ACUITY_SCORE: 56
ADLS_ACUITY_SCORE: 58
ADLS_ACUITY_SCORE: 58
ADLS_ACUITY_SCORE: 56
ADLS_ACUITY_SCORE: 56
DIFFICULTY_COMMUNICATING: NO
ADLS_ACUITY_SCORE: 58
ADLS_ACUITY_SCORE: 56
ADLS_ACUITY_SCORE: 54
ADLS_ACUITY_SCORE: 58
DRESS: 2-->COMPLETELY DEPENDENT (NOT DEVELOPMENTALLY APPROPRIATE)
ADLS_ACUITY_SCORE: 62
ADLS_ACUITY_SCORE: 56
ADLS_ACUITY_SCORE: 44
ADLS_ACUITY_SCORE: 56
ADLS_ACUITY_SCORE: 52
TOILETING: 2-->COMPLETELY DEPENDENT
ADLS_ACUITY_SCORE: 41
ADLS_ACUITY_SCORE: 56
TOILETING_MANAGEMENT: INCONTINENT
ADLS_ACUITY_SCORE: 56
ADLS_ACUITY_SCORE: 58
ADLS_ACUITY_SCORE: 54
ADLS_ACUITY_SCORE: 56
ADLS_ACUITY_SCORE: 58
ADLS_ACUITY_SCORE: 58
ADLS_ACUITY_SCORE: 54
ADLS_ACUITY_SCORE: 35
ADLS_ACUITY_SCORE: 56
ADLS_ACUITY_SCORE: 56
ADLS_ACUITY_SCORE: 58
ADLS_ACUITY_SCORE: 56
ADLS_ACUITY_SCORE: 62
ADLS_ACUITY_SCORE: 52
ADLS_ACUITY_SCORE: 54
ADLS_ACUITY_SCORE: 58
ADLS_ACUITY_SCORE: 56
ADLS_ACUITY_SCORE: 58
TOILETING: 2-->COMPLETELY DEPENDENT (NOT DEVELOPMENTALLY APPROPRIATE)
DIFFICULTY_EATING/SWALLOWING: NO
ADLS_ACUITY_SCORE: 58
CHANGE_IN_FUNCTIONAL_STATUS_SINCE_ONSET_OF_CURRENT_ILLNESS/INJURY: YES
ADLS_ACUITY_SCORE: 56
TRANSFERRING: 2-->COMPLETELY DEPENDENT (NOT DEVELOPMENTALLY APPROPRIATE)
ADLS_ACUITY_SCORE: 62
ADLS_ACUITY_SCORE: 57
ADLS_ACUITY_SCORE: 58
ADLS_ACUITY_SCORE: 56
ADLS_ACUITY_SCORE: 58
ADLS_ACUITY_SCORE: 48
ADLS_ACUITY_SCORE: 56
ADLS_ACUITY_SCORE: 58
ADLS_ACUITY_SCORE: 58
ADLS_ACUITY_SCORE: 52
ADLS_ACUITY_SCORE: 52
ADLS_ACUITY_SCORE: 58
CONCENTRATING,_REMEMBERING_OR_MAKING_DECISIONS_DIFFICULTY: NO
ADLS_ACUITY_SCORE: 56
DOING_ERRANDS_INDEPENDENTLY_DIFFICULTY: YES
ADLS_ACUITY_SCORE: 56
ADLS_ACUITY_SCORE: 57
ADLS_ACUITY_SCORE: 56
ADLS_ACUITY_SCORE: 58
ADLS_ACUITY_SCORE: 56
ADLS_ACUITY_SCORE: 58
ADLS_ACUITY_SCORE: 62
ADLS_ACUITY_SCORE: 58
ADLS_ACUITY_SCORE: 54
ADLS_ACUITY_SCORE: 58
ADLS_ACUITY_SCORE: 56
ADLS_ACUITY_SCORE: 58
ADLS_ACUITY_SCORE: 35
ADLS_ACUITY_SCORE: 56
ADLS_ACUITY_SCORE: 54
ADLS_ACUITY_SCORE: 56
ADLS_ACUITY_SCORE: 60
ADLS_ACUITY_SCORE: 56
ADLS_ACUITY_SCORE: 56
ADLS_ACUITY_SCORE: 58
FALL_HISTORY_WITHIN_LAST_SIX_MONTHS: YES
ADLS_ACUITY_SCORE: 56
ADLS_ACUITY_SCORE: 52
ADLS_ACUITY_SCORE: 54
ADLS_ACUITY_SCORE: 56
ADLS_ACUITY_SCORE: 58
ADLS_ACUITY_SCORE: 56
ADLS_ACUITY_SCORE: 57
ADLS_ACUITY_SCORE: 56
ADLS_ACUITY_SCORE: 56
ADLS_ACUITY_SCORE: 58
ADLS_ACUITY_SCORE: 60
BATHING: 2-->COMPLETELY DEPENDENT (NOT DEVELOPMENTALLY APPROPRIATE)
ADLS_ACUITY_SCORE: 58
ADLS_ACUITY_SCORE: 56
ADLS_ACUITY_SCORE: 58
ADLS_ACUITY_SCORE: 54
ADLS_ACUITY_SCORE: 58
ADLS_ACUITY_SCORE: 52
ADLS_ACUITY_SCORE: 58
ADLS_ACUITY_SCORE: 56
ADLS_ACUITY_SCORE: 48
ADLS_ACUITY_SCORE: 60
ADLS_ACUITY_SCORE: 56
ADLS_ACUITY_SCORE: 62
ADLS_ACUITY_SCORE: 56
ADLS_ACUITY_SCORE: 54
ADLS_ACUITY_SCORE: 56
ADLS_ACUITY_SCORE: 62
ADLS_ACUITY_SCORE: 58
ADLS_ACUITY_SCORE: 57
ADLS_ACUITY_SCORE: 60
ADLS_ACUITY_SCORE: 35
ADLS_ACUITY_SCORE: 56
ADLS_ACUITY_SCORE: 60
ADLS_ACUITY_SCORE: 56
ADLS_ACUITY_SCORE: 52
ADLS_ACUITY_SCORE: 62
HEARING_DIFFICULTY_OR_DEAF: NO
ADLS_ACUITY_SCORE: 58
ADLS_ACUITY_SCORE: 58
ADLS_ACUITY_SCORE: 57
ADLS_ACUITY_SCORE: 52
ADLS_ACUITY_SCORE: 56
ADLS_ACUITY_SCORE: 62
ADLS_ACUITY_SCORE: 56
ADLS_ACUITY_SCORE: 56
ADLS_ACUITY_SCORE: 60
ADLS_ACUITY_SCORE: 58
ADLS_ACUITY_SCORE: 56
ADLS_ACUITY_SCORE: 54
ADLS_ACUITY_SCORE: 58
ADLS_ACUITY_SCORE: 44
ADLS_ACUITY_SCORE: 56
ADLS_ACUITY_SCORE: 48
NUMBER_OF_TIMES_PATIENT_HAS_FALLEN_WITHIN_LAST_SIX_MONTHS: 1
ADLS_ACUITY_SCORE: 56
DRESSING/BATHING: BATHING DIFFICULTY, ASSISTANCE 1 PERSON
ADLS_ACUITY_SCORE: 56
ADLS_ACUITY_SCORE: 58
ADLS_ACUITY_SCORE: 48
ADLS_ACUITY_SCORE: 58
ADLS_ACUITY_SCORE: 56
ADLS_ACUITY_SCORE: 56
ADLS_ACUITY_SCORE: 58

## 2023-01-01 ASSESSMENT — ENCOUNTER SYMPTOMS
CONFUSION: 1
FEVER: 1

## 2023-07-28 PROBLEM — L03.116 LEFT LEG CELLULITIS: Status: ACTIVE | Noted: 2023-01-01

## 2023-07-28 PROBLEM — R41.82 ALTERED MENTAL STATUS, UNSPECIFIED ALTERED MENTAL STATUS TYPE: Status: ACTIVE | Noted: 2023-01-01

## 2023-07-28 PROBLEM — A41.9 SEPSIS, DUE TO UNSPECIFIED ORGANISM, UNSPECIFIED WHETHER ACUTE ORGAN DYSFUNCTION PRESENT (H): Status: ACTIVE | Noted: 2023-01-01

## 2023-07-28 NOTE — PHARMACY-CONSULT NOTE
Pharmacy Vancomycin Initial Note  Date of Service 2023  Patient's  1955  67 year old, female    Indication: Sepsis    Current estimated CrCl = CrCl cannot be calculated (No successful lab value found.).      Nephrotoxins and other renal medications (From now, onward)      Start     Dose/Rate Route Frequency Ordered Stop    23 1300  vancomycin (VANCOCIN) 1,250 mg in sodium chloride 0.9 % 250 mL intermittent infusion         1,250 mg  over 90 Minutes Intravenous ONCE 23 1257                   Plan:  Vancomycin  1250 mg IV once in the ED.   Please reconsult Pharmacy if Vancomycin should continue beyond the initial dose in the ED.     Thanks,    Amy Shen, [facility]

## 2023-07-28 NOTE — PROGRESS NOTES
"[facility] ED Handoff Report    ED Chief Complaint: fever, altered mental status    ED Diagnosis:  (R41.82) Altered mental status, unspecified altered mental status type    (A41.9) Sepsis, due to unspecified organism, unspecified whether acute organ dysfunction present (H)    (L03.116) Left leg cellulitis    PMH:  History reviewed. No pertinent past medical history.     Code Status:  No Order     Falls Risk: Yes     Current Living Situation/Residence: lives in a skilled nursing facility ([facility])    Elimination Status: Continent: wears briefs     Activity Level: Total assist/lift but was independent prior    Patients Preferred Language:  English     Needed: No    Vital Signs:  /51   Pulse (!) 122   Temp 97.6  F (36.4  C) (Oral)   Resp 28   Ht 1.651 m (5' 5\")   Wt 90.7 kg (200 lb)   SpO2 96%   BMI 33.28 kg/m       Cardiac Rhythm: a-flutter    Pain Score: Patient is unable to quantify.    Is the Patient Confused:  Yes    Tests Performed: Done: Labs and Imaging    Treatments Provided:  IV ABX given    Family Dynamics/Concerns: No    Family Updated On Visitor Policy: No    Plan of Care Communicated to Family: Yes    Who Was Updated about Plan of Care: sisterSindy Checklist Done and Signed by Patient: No    Medications sent with patient: no    Additional Information: NH was called and JULISA Pinto was updated about patient. Temp was 103 F rectal but rechecked oral temp and came down to 97 F. See flowsheets for more charting.     RN: Bernard Constantino RN   7/28/2023 3:42 PM      "

## 2023-07-28 NOTE — MEDICATION SCRIBE - ADMISSION MEDICATION HISTORY
Medication Scribe Admission Medication History    Admission medication history is complete. The information provided in this note is only as accurate as the sources available at the time of the update.    Medication reconciliation/reorder completed by provider prior to medication history? No    Information Source(s): Facility ([facility]/) medication list/MAR via       Pertinent Information: Received printed MAR from [facility] [phone]. This was source material for medication history.    Changes made to PTA medication list:  Added: Tums, Invokana, Zytrec, Flexeril, Cymbalta, Bydureon, Ferrous Sulfate, Hydrochlorothiazide, Insulins, Lamotrigine, Metformin, Nystatin, Zyprexa, Omeprazole, Oxycodone, Rexulti, Senna, Zanaflex, Vitamin D2  Deleted: Dulcolax, Klonopin, Gabapentin, Magnesium C, Robaxin, Naprosyn, Miralax, Seroquel   Changed: Levothyroxine 88 to 137, Lisinopril 2.5 to 5. Tylenol 325 to 500    Medication Affordability:Not able to assess       Allergies reviewed with patient and updates made in EHR: yes    Medication History Completed By: Titi Keane 7/28/2023 3:15 PM    Prior to Admission medications    Medication Sig Last Dose Taking? Auth Provider Long Term End Date   acetaminophen (TYLENOL) 500 MG tablet Take 500 mg by mouth every 6 hours as needed for mild pain More than a month Yes Reported, Patient     calcium carbonate (TUMS) 500 MG chewable tablet Take 1 chew tab by mouth 2 times daily 7/28/2023 at 0800 Yes Reported, Patient     canagliflozin (INVOKANA) 300 MG tablet Take 300 mg by mouth every morning 7/28/2023 at 0800 Yes Reported, Patient     cetirizine (ZYRTEC) 10 MG tablet Take 10 mg by mouth daily 7/28/2023 at 0800 Yes Reported, Patient     cyclobenzaprine (FLEXERIL) 10 MG tablet Take 10 mg by mouth 3 times daily as needed More than a month Yes Reported, Patient     DULoxetine (CYMBALTA) 30 MG capsule Take 30 mg by mouth daily 7/28/2023 at 0800 Yes Reported, Patient     ferrous  sulfate (FE TABS) 325 (65 Fe) MG EC tablet Take 325 mg by mouth every other day 7/28/2023 at 0800 Yes Reported, Patient     hydrochlorothiazide (HYDRODIURIL) 50 MG tablet Take 50 mg by mouth daily 7/28/2023 at 0800 Yes Reported, Patient     insulin aspart (NOVOLOG PEN) 100 UNIT/ML pen Inject 13 Units Subcutaneous every morning 7/28/2023 at 0800 Yes Reported, Patient     insulin aspart (NOVOLOG PEN) 100 UNIT/ML pen Inject 14 Units Subcutaneous daily (with lunch) 7/27/2023 at 1200 Yes Reported, Patient     insulin aspart (NOVOLOG PEN) 100 UNIT/ML pen Inject 34 Units Subcutaneous daily (with dinner) 7/27/2023 at 1700 Yes Reported, Patient     insulin glargine (LANTUS PEN) 100 UNIT/ML pen Inject 34 Units Subcutaneous At Bedtime 7/27/2023 at 1900 Yes Reported, Patient     lamoTRIgine (LAMICTAL) 200 MG tablet Take 200 mg by mouth 2 times daily 7/28/2023 at 0800 Yes Reported, Patient     levothyroxine (SYNTHROID/LEVOTHROID) 137 MCG tablet Take 137 mcg by mouth daily 7/28/2023 at 0800 Yes Reported, Patient     lisinopril (ZESTRIL) 2.5 MG tablet Take 2.5 mg by mouth daily 7/28/2023 at 0800 Yes Reported, Patient     metFORMIN (GLUCOPHAGE) 1000 MG tablet Take 1,000 mg by mouth 2 times daily (with meals) 7/28/2023 at 0800 Yes Reported, Patient Yes    mirtazapine (REMERON) 45 MG tablet Take 45 mg by mouth At Bedtime. 7/27/2023 at 2000 Yes Reported, Patient     nystatin (MYCOSTATIN) 907784 UNIT/GM external powder Apply topically 2 times daily 7/28/2023 at 0800 Yes Reported, Patient     OLANZapine (ZYPREXA) 2.5 MG tablet Take 2.5 mg by mouth At Bedtime 7/27/2023 at 2000 Yes Reported, Patient     omeprazole 20 MG tablet Take 20 mg by mouth daily 7/28/2023 at 0800 Yes Reported, Patient     oxyCODONE IR (ROXICODONE) 10 MG tablet Take 10 mg by mouth every 4 hours as needed for severe pain 5 times a day for pain: 0700, 1100, 1500, 1900, 2359 7/28/2023 at 0700 Yes Reported, Patient     pravastatin (PRAVACHOL) 20 MG tablet Take 20 mg by  mouth daily. 7/27/2023 at 2000 Yes Reported, Patient     REXULTI 4 MG tablet Take 4 mg by mouth daily 7/27/2023 at pm Yes Reported, Patient     senna (SENOKOT) 8.6 MG tablet Take 2 tablets by mouth 2 times daily as needed for constipation More than a month Yes Reported, Patient     tiZANidine (ZANAFLEX) 2 MG tablet Take 2 mg by mouth every 6 hours as needed for muscle spasms 2 to 4 mg as needed More than a month Yes Reported, Patient     vitamin D2 (ERGOCALCIFEROL) 87549 units (1250 mcg) capsule Take 50,000 Units by mouth once a week 7/22/2023 at 0800 Yes Reported, Patient     exenatide ER (BYDUREON BCISE) 2 MG/0.85ML auto-injector Inject 2 mg Subcutaneous every 7 days 7/25/2023 at 0800  Reported, Patient Yes

## 2023-07-28 NOTE — ED NOTES
Bed: [facility]-19  Expected date: 7/28/23  Expected time: 12:20 PM  Means of arrival: Ambulance  Comments:  [facility]  Sepsis   Hypotensive  Tachycardic

## 2023-07-28 NOTE — ED PROVIDER NOTES
EMERGENCY DEPARTMENT ENCOUNTER      NAME: Ginna Mireles  AGE: 67 year old female  YOB: 1955  MRN: [id number]  EVALUATION DATE & TIME: 2023 12:25 PM    PCP: Vandana Almazan    ED PROVIDER: Juanito Gray M.D.      Chief Complaint   Patient presents with    Fever    Altered Mental Status         FINAL IMPRESSION:  1.  Acute altered mental status.  2.  Acute tachycardia.  3.  Acute left lower leg cellulitis.      ED COURSE & MEDICAL DECISION MAKIN:33 PM I met with the patient to gather history and to perform my initial exam. We discussed plans for the ED course, including diagnostic testing and treatment. PPE worn: cloth mask.  Patient sent from her facility for tachycardia, low blood pressure although 110/56 now.  Heart rate 127.  And fever.  No obvious source.  Patient is full code.  Septic work-up but beginning, antibiotics ordered.  1:53 PM.  EKG showing sinus tachycardia.  Heart rate came down some with IV fluids.  Head CT without acute findings.  Chest x-ray with bibasilar atelectasis, less likely small infiltrates.  Urinalysis still pending.  Blood culture sent.  Lactate 3.3.  A second lactate level pending.  Chemistries with elevated BUN and creatinine 42 and 1.34.  Elevated anion gap at 18 and sugar 396.  Calcium 9.4.  Initial troponin 37 and second level pending.  White count 13.2 with left shift.  Not acidotic on venous blood gas.  Ketones elevated.  Findings suggestive of ketosis from not eating or drinking.  Plan admit patient to [facility].  We will page admitting service.  2:07 PM.  Hospitalist called back and is in agreement with patient admission to [facility] inpatient.  Repeat lactate down to 2.6.  Patient now talking to the nursing.    Critical care time exclusive of procedures: 30 minutes.      Pertinent Labs & Imaging studies reviewed. (See chart for details)  67 year old female presents to the Emergency Department for evaluation of fever and altered  mental status.    At the conclusion of the encounter I discussed the results of all of the tests and the disposition. The questions were answered. The patient or family acknowledged understanding and was agreeable with the care plan.              Medical Decision Making    History:  Supplemental history from: Documented in chart, if applicable and EMS  External Record(s) reviewed: Both inpatient and outpatient computer records were reviewed.    Work Up:  Chart documentation includes differential considered and any EKGs or imaging independently interpreted by provider, where specified.  Differential diagnosis includes urinary tract infection, pneumonia, stroke, etc.  In additional to work up documented, I considered the following work up: Documented in chart, if applicable.    External consultation:  Discussion of management with another provider: Documented in chart, if applicable    Complicating factors:  Care impacted by chronic illness: Chronic Lung Disease, Chronic Pain, Diabetes, Heart Disease, Hyperlipidemia, and Hypertension  Care affected by social determinants of health: N/A    Disposition considerations: Plan admission to the hospital.          MEDICATIONS GIVEN IN THE EMERGENCY:  Medications - No data to display    NEW PRESCRIPTIONS STARTED AT TODAY'S ER VISIT  New Prescriptions    No medications on file          =================================================================    HPI    Patient information was obtained from: EMS     Use of : N/A         Ginna Mireles is a 67 year old female with a pertinent history of hypertension, hyperlipidemia, CAD, COPD, chronic pain, and type II diabetes who presents to this ED via ambulance for evaluation of altered mental status.    Per EMS, patient is mostly independent on baseline but was found to be altered today at nursing home. Patient was found to have a fever of 104.1. She was recently seen at [place] for back pain and recent fall and discharged  with muscle relaxer.     HPI limited due to altered mental status.    REVIEW OF SYSTEMS   Review of Systems   Unable to perform ROS: Mental status change   Constitutional:  Positive for fever.   Psychiatric/Behavioral:  Positive for confusion.         PAST MEDICAL HISTORY:  No past medical history on file.    PAST SURGICAL HISTORY:  Past Surgical History:   Procedure Laterality Date    CHOLECYSTECTOMY      HYSTERECTOMY      NECK SURGERY      TONSILLECTOMY             CURRENT MEDICATIONS:    acetaminophen (TYLENOL) 325 MG tablet  bisacodyl (DULCOLAX) 5 MG EC tablet  bisacodyl (DULCOLAX) 5 MG EC tablet  ClonAZEPAM (KLONOPIN) 0.5 MG tablet  gabapentin (NEURONTIN) 300 MG capsule  levothyroxine (LEVOTHROID) 88 MCG tablet  lisinopril (PRINIVIL,ZESTRIL) 5 MG tablet  magnesium citrate solution  magnesium citrate solution  methocarbamol (ROBAXIN) 500 MG tablet  mirtazapine (REMERON) 45 MG tablet  naproxen (NAPROSYN) 500 MG tablet  polyethylene glycol (MIRALAX) packet  polyethylene glycol (MIRALAX) packet  pravastatin (PRAVACHOL) 20 MG tablet  quetiapine (SEROQUEL) 100 MG tablet        ALLERGIES:  Allergies   Allergen Reactions    Hydroxyzine     Mushroom     Penicillins      Hives      Tamiflu [Oseltamivir]        FAMILY HISTORY:  Family History   Problem Relation Age of Onset    Cataracts Mother     Lung Cancer Father     HIV/AIDS Brother     Diabetes Sister        SOCIAL HISTORY:   Social History     Socioeconomic History    Marital status:    Tobacco Use    Smoking status: Never    Smokeless tobacco: Never   No drugs, alcohol, or tobacco.    VITALS:  /56   Pulse (!) 127   Temp (!) 103  F (39.4  C) (Rectal)   Resp 26   SpO2 93%     PHYSICAL EXAM    Vital Signs:  /56   Pulse (!) 127   Temp (!) 103  F (39.4  C) (Rectal)   Resp 26   SpO2 93%   General:  On entering the room she is in no apparent distress.  Nonverbal to questioning.  Does not follow commands.  Neck:  Neck supple with full range of  motion and nontender.    Back:  Back and spine are nontender.  No costovertebral angle tenderness.    HEENT:  Oropharynx clear with moist mucous membranes.  HEENT unremarkable.    Pulmonary:  Chest clear to auscultation without rhonchi rales or wheezing.    Cardiovascular:  Cardiac regular rate and rhythm without murmurs rubs or gallops.    Abdomen:  Abdomen soft nontender.  There is no rebound or guarding.    Muskuloskeletal:  She moves all 4 without any difficulty and has normal neurovascular exams.  Extremities without clubbing, cyanosis, or edema.  Legs and calves are nontender.    Neuro:  She is alert and oriented ×0 and moves all extremities symmetrically.    Psych:  Normal affect.    Skin:  Unremarkable and warm and dry.  Left lower leg cellulitis, red and warm to the touch.       LAB:  All pertinent labs reviewed and interpreted.  Labs Ordered and Resulted from Time of ED Arrival to Time of ED Departure   LACTIC ACID WHOLE BLOOD - Abnormal       Result Value    Lactic Acid 3.3 (*)    BASIC METABOLIC PANEL - Abnormal    Sodium 136      Potassium 3.5      Chloride 99      Carbon Dioxide (CO2) 19 (*)     Anion Gap 18 (*)     Urea Nitrogen 41.9 (*)     Creatinine 1.34 (*)     Calcium 9.4      Glucose 396 (*)     GFR Estimate 43 (*)    TROPONIN T, HIGH SENSITIVITY - Abnormal    Troponin T, High Sensitivity 37 (*)    BLOOD GAS VENOUS - Abnormal    pH Venous 7.42      pCO2 Venous 32 (*)     pO2 Venous 56 (*)     Bicarbonate Venous 20 (*)     Base Excess/Deficit (+/-) -4.3      Oxyhemoglobin Venous 89.6 (*)     O2 Sat, Venous 91.2 (*)    KETONE BETA-HYDROXYBUTYRATE QUANTITATIVE, RAPID - Abnormal    Ketone (Beta-Hydroxybutyrate) Quantitative 1.70 (*)    CBC WITH PLATELETS AND DIFFERENTIAL - Abnormal    WBC Count 13.2 (*)     RBC Count 4.41      Hemoglobin 13.1      Hematocrit 38.7      MCV 88      MCH 29.7      MCHC 33.9      RDW 15.1 (*)     Platelet Count 142 (*)     % Neutrophils 88      % Lymphocytes 3      %  Monocytes 7      % Eosinophils 1      % Basophils 0      % Immature Granulocytes 1      NRBCs per 100 WBC 0      Absolute Neutrophils 11.5 (*)     Absolute Lymphocytes 0.4 (*)     Absolute Monocytes 1.0      Absolute Eosinophils 0.2      Absolute Basophils 0.0      Absolute Immature Granulocytes 0.1      Absolute NRBCs 0.0     GLUCOSE BY METER - Abnormal    GLUCOSE BY METER POCT 410 (*)    ROUTINE UA WITH MICROSCOPIC REFLEX TO CULTURE   LACTIC ACID WHOLE BLOOD   LACTIC ACID WHOLE BLOOD   TROPONIN T, HIGH SENSITIVITY   BLOOD CULTURE       RADIOLOGY:  Reviewed all pertinent imaging. Please see official radiology report.  XR Chest Port 1 View   Final Result   IMPRESSION: Significantly hypoexpanded lungs. Mild basilar opacities are probably atelectasis from lung hypoexpansion, though cannot definitively exclude an infectious or inflammatory process. No pleural effusion. Upper normal heart size. Surgical    changes spine.         Head CT w/o contrast   Final Result   IMPRESSION:   1.  No CT evidence for acute intracranial process.   2.  Brain atrophy and presumed chronic microvascular ischemic changes as above.                 EKG:    Sinus tachycardia 128, incomplete right bundle branch block, nonspecific ST segment changes.  Very similar to previous EKG of March 2017 but slightly faster.    I have independently reviewed and interpreted the EKG(s) documented above.    PROCEDURES:         I, Willy Bermudez, am serving as a scribe to document services personally performed by Dr. Gray based on my observation and the provider's statements to me. I, Juanito Gray MD attest that Willy Bermudez is acting in a scribe capacity, has observed my performance of the services and has documented them in accordance with my direction.    Juanito Gray M.D.  Emergency Medicine  [facility] EMERGENCY DEPARTMENT  [facility] [address] [facility] [id number] [phone]  Dept: [phone]        Juanito Gray MD  07/28/23 8133       Juanito Gray MD  07/28/23 3700

## 2023-07-28 NOTE — ED TRIAGE NOTES
Patient arrives via EMS for evaluation of 104.1 fever at nursing home. EMS state they brought her to [facility] yesterday for back pain and recent fall, states all they did yesterday was sent back with a muscle relaxer. Patient with altered mental status, fever, and tachycardia. Received 500 mL NS from EMS.      Triage Assessment       Row Name 07/28/23 1231       Triage Assessment (Adult)    Airway WDL WDL       Skin Circulation/Temperature WDL    Skin Circulation/Temperature WDL X;temperature    Skin Temperature --  hot       Cardiac WDL    Cardiac WDL X;rhythm    Pulse Rate & Regularity tachycardic       Peripheral/Neurovascular WDL    Peripheral Neurovascular WDL WDL       Cognitive/Neuro/Behavioral WDL    Cognitive/Neuro/Behavioral WDL X;level of consciousness    Level of Consciousness lethargic

## 2023-07-28 NOTE — PHARMACY-VANCOMYCIN DOSING SERVICE
Pharmacy Vancomycin Initial Note  Date of Service 2023  Patient's  1955  67 year old, female    Indication: Sepsis    Current estimated CrCl = Estimated Creatinine Clearance: 45.3 mL/min (A) (based on SCr of 1.34 mg/dL (H)).    Creatinine for last 3 days  2023: 12:45 PM Creatinine 1.34 mg/dL;  2:48 PM Creatinine 1.34 mg/dL    Recent Vancomycin Level(s) for last 3 days  No results found for requested labs within last 3 days.      Vancomycin IV Administrations (past 72 hours)                     vancomycin (VANCOCIN) 1,250 mg in sodium chloride 0.9 % 250 mL intermittent infusion (mg) 1,250 mg New Bag 23 1343                    Nephrotoxins and other renal medications (From now, onward)      Start     Dose/Rate Route Frequency Ordered Stop    23 1400  vancomycin (VANCOCIN) 1,250 mg in sodium chloride 0.9 % 250 mL intermittent infusion         1,250 mg  over 90 Minutes Intravenous EVERY 24 HOURS 23 1648              Contrast Orders - past 72 hours (72h ago, onward)      Start     Dose/Rate Route Frequency Stop    23 1630  iopamidol (ISOVUE-370) solution 75 mL         75 mL Intravenous ONCE 23 1611            [employer] Prediction of Planned Initial Vancomycin Regimen  Regimen: 1250 mg IV every 24 hours.  Start time: 01:43 on 2023  Exposure target: AUC24 (range)400-600 mg/L.hr   AUC24,ss: 480 mg/L.hr  Probability of AUC24 > 400: 71 %  Ctrough,ss: 14.9 mg/L  Probability of Ctrough,ss > 20: 23 %  Probability of nephrotoxicity (Lodise JOSE J ): 10 %          Plan:  Start vancomycin  1250 mg IV q24h.   Vancomycin monitoring method: AUC  Vancomycin therapeutic monitoring goal: 400-600 mg*h/L  Pharmacy will check vancomycin levels as appropriate in 1-3 Days.    Serum creatinine levels will be ordered  currently has daily BMP ordered .      Chanel Virk [facility]

## 2023-07-28 NOTE — ED NOTES
Updated nurse, Millie, at pt's facility and updated pt's sister, Sindy. (Sindy's phone number is [phone])

## 2023-07-28 NOTE — H&P
"[facility]    History and Physical - Hospitalist Service       Date of Admission:  7/28/2023    Assessment & Plan      Ginna Mireles is a 67 year old female w/PMHx of T2DM on insulin, liver cirrhosis, HTN, HLD, CAD, COPD, remote EtOH use disorder, bipolar disorder, and chronic pain admitted on 7/28/2023 for lethargy and fevers, found to be septic and started on vancomycin and levofloxacin. Unclear source of infection at this time.      Sepsis 2/2 LLE cellulitis vs PNA    Acute metabolic encephalopathy   Lactic acidosis   Unwitnessed fall   Patient presents w/fevers of a T103F and AMS. Per nursing facility, patient hand been complaining of worsening back for 2 days. She was seen at [place] yesterday and discharged w/a muscle relaxant. Since then, she was reported to be less mobile than usual and \"in and out of sleep.\" She is usually communicative and uses a walker at baseline. This morning she had an unwitnessed fall and was found down ~7AM. She was febrile, hypotensive, and unresponsive so nursing staff sent her to the ED. Unclear if she hit her had or had LOC. Not on blood thinners. Initial workup was revealing elevated lactate and elevated WBC. Received 1L NS fluid with some downtrend in lactate. Unclear source of infection at this time. LLE along the shin was warm to touch, mildly erythematous, and non-edematous. Possibly cellulitis. CXR showed lung hypoexpansion, concerning possible atelectasis vs PNA. UA was negative for a UTI. Initially there were concerns for SBP, so a CT abdomen obtained. Fortunately, it was negative for acute infectious pathology. Cirrhosis was noted w/features of portal HTN, but no ascites. Possible consolidation in R lung base. Also considered serotonin syndrome in setting of polypharmacy - she is on several antipsychotic and mood stabilizers. No neuromuscular abnormalities, such as clonus or tremors appreciated on physical exam.   - Continue vancomycin daily, dosing " per pharmacy  - Continue levofloxacin 500mg Q24H  - LR mIVF @ 100mL/hr  - 1L LR bolus IV one-time  - Trend lactate   - BCX pending    Hyperglycemia  H/o TDM on insulin   AGMA, Concerns for DKA  , serum ketone 1.7, and AG 18 on admission. Electrolytes otherwise normal, which is reassuring. VBG showed normal pH, pCO2 32, and bicarb 20. Unclear if this is DKA at this time, since AGMA can also be explained by lactic acidosis in setting of sepsis discussed above.   - 17u glargine & 10u aspart one time.   - Recheck BG this evening   - 22u glargine daily  - MDSS   - Hold PTA canagliflozin  - Hold PTA metformin   - Low threshold to start DKA protocol if no improvement in BG     RLQ pain   H/o cirrhosis  H/o EtOH use disorder   Per chart review, patient has remote h/o EtOH use disorder, likely causing cirrhosis. Patient endorses pain in RLQ and has tenderness on palpation. Reports having ascites in the past, but no note of it in chart. CT abdomen also did not show ascites, which is reassuring. No acute interventions at this time.   - Hepatic panel     DK  Cr 1.34 on admission, likely prerenal in setting of sepsis discussed above. Baseline Cr ~0.8 - 0.9 per chart review.   - Fluids per above  - BMP daily     Elevated trops  Trop 37 and 35 on repeat. Likely demand ischemia in setting of infection discussed above. EKG on admission w/o acute ischemic changes.     Chronic Conditions:  HTN   SBP 110s.   - Hold PTA lisinopril 5mg daily   - Hold PTA hydrochlorothiazide 50mg daily     HLD  - Hold PTA pravastatin    Bipolar disorder    - Continue PTA lamotrigine 200mg PO BID  - Continue PTA olanzapine 2.5mg PO daily   - Hold PTA duloxetine 30mg PO daily. Consider restarting tomorrow  - Hold PTA mirtazapine 45mg PO daily   - Hold PTA rexulti 4mg PO daily     Chronic pain  - Tylenol 500mg QID PRN  - Continue PTA oxycodone 10mg Q4H PRN  - Hold PTA flexeril 10mg TID PRN  - Hold PTA tizanidine 2mg BID Q6H PRN    Acquired  "hypothyroidism  - Continue PTA synthroid       Diet:    DVT Prophylaxis: Enoxaparin (Lovenox) SQ  [place] Catheter: Not present  Lines: None     Cardiac Monitoring: None  Code Status:      Clinically Significant Risk Factors Present on Admission                  # Hypertension: Noted on problem list      # Obesity: Estimated body mass index is 33.28 kg/m  as calculated from the following:    Height as of this encounter: 1.651 m (5' 5\").    Weight as of this encounter: 90.7 kg (200 lb).            Disposition Plan      Expected Discharge Date: 07/30/2023                The patient's care was discussed with the Attending Physician, Dr. Son .    Booker Olson MD  Hospitalist Service  [facility]  Securely message with [employer] (more info)  Text page via [employer] Paging/Directory     ______________________________________________________________________    Chief Complaint   AMS, fevers    History is obtained from nursing facility staff and patient    History of Present Illness   Ginna Mireles is a 67 year old female w/PMHx of T2DM on insulin, liver cirrhosis, HTN, HLD, CAD, COPD, remote EtOH use disorder, bipolar disorder, and chronic pain admitted on 7/28/2023 for lethargy and fevers.     On Tues 7/25, patient began to complain of back pain. Had spinal surgery in February, supposed to have another one in August. Back pain worsened and was taken to [place] ED on Thurs 7/27. She was discharged back to the facility with a muscle relaxant.   Overnight on Thursday, patient became less mobile and was in and out of sleep. Had not eaten since Wednesday. Typically communicative and ambulatory w/walker.   Fell and found down @ 7am this AM. Not sure if she hit her head or LOC. Patient does not recall. Reports that she \"banged up her toes\" and is in \"rough shape.\" Nurse says she was feverish to T104F, hypotensive, and tachycardic so she was sent tot he ED.   Patient denies HA, vision changes, chest pain, dyspnea, " and nausea/vomiting. Points to RLQ for abdominal pain. No changes in urination. Has been having diarrhea - does not know how many times per day.       Past Medical History    History reviewed. No pertinent past medical history.    Past Surgical History   Past Surgical History:   Procedure Laterality Date    CHOLECYSTECTOMY      HYSTERECTOMY      NECK SURGERY      TONSILLECTOMY         Prior to Admission Medications   Prior to Admission Medications   Prescriptions Last Dose Informant Patient Reported? Taking?   DULoxetine (CYMBALTA) 30 MG capsule 7/28/2023 at 0800  Yes Yes   Sig: Take 30 mg by mouth daily   OLANZapine (ZYPREXA) 2.5 MG tablet 7/27/2023 at 2000  Yes Yes   Sig: Take 2.5 mg by mouth At Bedtime   REXULTI 4 MG tablet 7/27/2023 at pm  Yes Yes   Sig: Take 4 mg by mouth daily   acetaminophen (TYLENOL) 500 MG tablet More than a month  Yes Yes   Sig: Take 500 mg by mouth every 6 hours as needed for mild pain   calcium carbonate (TUMS) 500 MG chewable tablet 7/28/2023 at 0800  Yes Yes   Sig: Take 1 chew tab by mouth 2 times daily   canagliflozin (INVOKANA) 300 MG tablet 7/28/2023 at 0800  Yes Yes   Sig: Take 300 mg by mouth every morning   cetirizine (ZYRTEC) 10 MG tablet 7/28/2023 at 0800  Yes Yes   Sig: Take 10 mg by mouth daily   cyclobenzaprine (FLEXERIL) 10 MG tablet More than a month  Yes Yes   Sig: Take 10 mg by mouth 3 times daily as needed   exenatide ER (BYDUREON BCISE) 2 MG/0.85ML auto-injector 7/25/2023 at 0800  Yes No   Sig: Inject 2 mg Subcutaneous every 7 days   ferrous sulfate (FE TABS) 325 (65 Fe) MG EC tablet 7/28/2023 at 0800  Yes Yes   Sig: Take 325 mg by mouth every other day   hydrochlorothiazide (HYDRODIURIL) 50 MG tablet 7/28/2023 at 0800  Yes Yes   Sig: Take 50 mg by mouth daily   insulin aspart (NOVOLOG PEN) 100 UNIT/ML pen 7/28/2023 at 0800  Yes Yes   Sig: Inject 13 Units Subcutaneous every morning   insulin aspart (NOVOLOG PEN) 100 UNIT/ML pen 7/27/2023 at 1200  Yes Yes   Sig: Inject  14 Units Subcutaneous daily (with lunch)   insulin aspart (NOVOLOG PEN) 100 UNIT/ML pen 7/27/2023 at 1700  Yes Yes   Sig: Inject 34 Units Subcutaneous daily (with dinner)   insulin glargine (LANTUS PEN) 100 UNIT/ML pen 7/27/2023 at 1900  Yes Yes   Sig: Inject 34 Units Subcutaneous At Bedtime   lamoTRIgine (LAMICTAL) 200 MG tablet 7/28/2023 at 0800  Yes Yes   Sig: Take 200 mg by mouth 2 times daily   levothyroxine (SYNTHROID/LEVOTHROID) 137 MCG tablet 7/28/2023 at 0800  Yes Yes   Sig: Take 137 mcg by mouth daily   lisinopril (ZESTRIL) 2.5 MG tablet 7/28/2023 at 0800  Yes Yes   Sig: Take 2.5 mg by mouth daily   metFORMIN (GLUCOPHAGE) 1000 MG tablet 7/28/2023 at 0800  Yes Yes   Sig: Take 1,000 mg by mouth 2 times daily (with meals)   mirtazapine (REMERON) 45 MG tablet 7/27/2023 at 2000  Yes Yes   Sig: Take 45 mg by mouth At Bedtime.   nystatin (MYCOSTATIN) 011950 UNIT/GM external powder 7/28/2023 at 0800  Yes Yes   Sig: Apply topically 2 times daily   omeprazole 20 MG tablet 7/28/2023 at 0800  Yes Yes   Sig: Take 20 mg by mouth daily   oxyCODONE IR (ROXICODONE) 10 MG tablet 7/28/2023 at 0700  Yes Yes   Sig: Take 10 mg by mouth every 4 hours as needed for severe pain 5 times a day for pain: 0700, 1100, 1500, 1900, 2359   pravastatin (PRAVACHOL) 20 MG tablet 7/27/2023 at 2000  Yes Yes   Sig: Take 20 mg by mouth daily.   senna (SENOKOT) 8.6 MG tablet More than a month  Yes Yes   Sig: Take 2 tablets by mouth 2 times daily as needed for constipation   tiZANidine (ZANAFLEX) 2 MG tablet More than a month  Yes Yes   Sig: Take 2 mg by mouth every 6 hours as needed for muscle spasms 2 to 4 mg as needed   vitamin D2 (ERGOCALCIFEROL) 91247 units (1250 mcg) capsule 7/22/2023 at 0800  Yes Yes   Sig: Take 50,000 Units by mouth once a week      Facility-Administered Medications: None          Physical Exam   Vital Signs: Temp: 97.6  F (36.4  C) Temp src: Oral BP: 102/51 Pulse: (!) 122   Resp: 28 SpO2: 96 % O2 Device: None (Room air)     Weight: 200 lbs 0 oz    Physical Exam  Constitutional:       Comments: Uncomfortable, selectively converses, answering yes/no questions   HENT:      Head: Normocephalic and atraumatic.      Mouth/Throat:      Mouth: Mucous membranes are dry.      Pharynx: No posterior oropharyngeal erythema.   Eyes:      General: No scleral icterus.  Cardiovascular:      Rate and Rhythm: Regular rhythm. Tachycardia present.      Heart sounds: No murmur heard.     No friction rub. No gallop.   Pulmonary:      Effort: No respiratory distress.      Breath sounds: No wheezing.      Comments: Shallow, fast breaths. Diminished breath sounds at lung bases bilaterally  Abdominal:      Palpations: Abdomen is soft. There is no mass.      Comments: Protuberant. RLQ tenderness to palpation. Erythema and tenderness under pannus.    Musculoskeletal:      Comments: LLE: erythema along shin, warm to touch. No apparent wounds or swelling.    Skin:     Comments: Warm to touch, diaphoretic.    Neurological:      General: No focal deficit present.      Mental Status: She is oriented to person, place, and time.   Psychiatric:         Mood and Affect: Mood normal.         Behavior: Behavior normal.           Data     I have personally reviewed the following data over the past 24 hrs:    13.2 (H)  \   13.1   / 142 (L)     136 99 41.9 (H) /  236 (H)   3.5 19 (L) 1.34 (H) \     ALT: 24 AST: 91 (H) AP: 183 (H) TBILI: 1.3 (H)   ALB: 2.9 (L) TOT PROTEIN: 7.3 LIPASE: 42     Trop: 35 (H) BNP: N/A     Procal: N/A CRP: N/A Lactic Acid: 2.6 (H)       INR:  1.69 (H) PTT:  N/A   D-dimer:  N/A Fibrinogen:  N/A       Imaging results reviewed over the past 24 hrs:   Recent Results (from the past 24 hour(s))   Head CT w/o contrast    Narrative    EXAM: CT HEAD W/O CONTRAST  LOCATION: [facility]  DATE: 7/28/2023    INDICATION: Altered mental status  COMPARISON: 02/27/2017  TECHNIQUE: Routine CT Head without IV contrast. Multiplanar reformats.  Dose reduction techniques were used.    FINDINGS:  INTRACRANIAL CONTENTS: No intracranial hemorrhage, extraaxial collection, or mass effect.  No CT evidence of acute infarct. Mild presumed chronic small vessel ischemic changes. Mild generalized volume loss. No hydrocephalus.     VISUALIZED ORBITS/SINUSES/MASTOIDS: No intraorbital abnormality. No paranasal sinus mucosal disease. No middle ear or mastoid effusion.    BONES/SOFT TISSUES: No acute abnormality.      Impression    IMPRESSION:  1.  No CT evidence for acute intracranial process.  2.  Brain atrophy and presumed chronic microvascular ischemic changes as above.   XR Chest Port 1 View    Narrative    EXAM: XR CHEST PORT 1 VIEW  LOCATION: [facility]  DATE: 7/28/2023    INDICATION: Fever  COMPARISON: 04/22/2018      Impression    IMPRESSION: Significantly hypoexpanded lungs. Mild basilar opacities are probably atelectasis from lung hypoexpansion, though cannot definitively exclude an infectious or inflammatory process. No pleural effusion. Upper normal heart size. Surgical   changes spine.     CT Abdomen Pelvis w Contrast    Narrative    EXAM: CT ABDOMEN PELVIS W CONTRAST  LOCATION: [facility]  DATE: 7/28/2023    INDICATION: Septic patient. Abdominal pain.  COMPARISON: CT abdomen pelvis 04/11/2018.  TECHNIQUE: CT scan of the abdomen and pelvis was performed following injection of IV contrast. Multiplanar reformats were obtained. Dose reduction techniques were used.  CONTRAST: 75ml isovue 370    FINDINGS:   LOWER CHEST: Mildly elevated right hemidiaphragm with a right basilar consolidation. No pleural effusions. Moderate coronary arterial calcifications. Possible small hiatal hernia.    HEPATOBILIARY: Cirrhotic liver. No focal liver lesions identified, noting that evaluation is limited by streak artifact. Gallbladder is absent. No biliary ductal dilation.    PANCREAS: Normal.    SPLEEN: Splenomegaly measuring 17.5  cm.    ADRENAL GLANDS: Normal.    KIDNEYS/BLADDER: Normal.    BOWEL: No obstruction or inflammation. Normal appendix. Scattered noninflamed sigmoid colonic diverticuli.    LYMPH NODES: No enlarged lymph nodes.    VASCULATURE: Gastroesophageal varices. Large splenorenal shunt. Retroaortic left renal vein. Patent portal, splenic, and superior mesenteric veins. Mild aortobiiliac atherosclerosis. No abdominal aortic aneurysm.    PELVIC ORGANS: Normal.    MUSCULOSKELETAL: Extensive thoracolumbar spinal fusion hardware. No acute bony abnormality.      Impression    IMPRESSION:     1.  No source of infection identified in the abdomen or pelvis.    2.  Consolidation at the right lung base, which could reflect atelectasis and/or infection.    3.  Cirrhosis, with features of portal hypertension including splenomegaly and varices. No ascites.    4.  Distal colonic diverticulosis. No inflamed colonic diverticuli.

## 2023-07-29 PROBLEM — N17.9 AKI (ACUTE KIDNEY INJURY) (H): Status: ACTIVE | Noted: 2023-01-01

## 2023-07-29 PROBLEM — Z79.4 CONTROLLED TYPE 2 DIABETES MELLITUS WITH HYPERGLYCEMIA, WITH LONG-TERM CURRENT USE OF INSULIN (H): Status: ACTIVE | Noted: 2023-01-01

## 2023-07-29 PROBLEM — E11.65 CONTROLLED TYPE 2 DIABETES MELLITUS WITH HYPERGLYCEMIA, WITH LONG-TERM CURRENT USE OF INSULIN (H): Status: ACTIVE | Noted: 2023-01-01

## 2023-07-29 PROBLEM — R78.81 GRAM-POSITIVE BACTEREMIA: Status: ACTIVE | Noted: 2023-01-01

## 2023-07-29 NOTE — PROGRESS NOTES
Pt from [facility] (has bed hold); anticipate returning when medically stable. PT/OT to evaluate. Care management following.  11:00 AM    INES Clement  7/29/2023

## 2023-07-29 NOTE — SIGNIFICANT EVENT
Significant Event Note    Time of event: 1:20 AM    Description of event:  Notified about blood cultures growing gram positive cocci in clusters.     Plan:  Patient already on vanc and levofloxacin; no changes will be made to antibiotics at this time. Lactic now down to 1.7 after two boluses.     Elly Centeno MD  [facility] Medicine Residency, PGY-3

## 2023-07-29 NOTE — PLAN OF CARE
Goal Outcome Evaluation:      Plan of Care Reviewed With: patient    Alert and oriented x 3. Able to state date and month only by looking at white board. Arouses to voice. Patient reports little to no pain relief from PRN pain mediations. States she is always in pain. Encouraged shifting her weight while in bed to relieve back pain. Patient could benefit from a lift room. Made request known to charge RN. PT/OT to see today.

## 2023-07-29 NOTE — PLAN OF CARE
Occupational Therapy: Orders received. Chart reviewed and discussed with care team.? Occupational Therapy not indicated due to pt reports she has been bed bound at LTC facility since beginning of year, receiving assist for all BADLs and not standing.? Defer discharge recommendations to medical care team.? Will complete orders.

## 2023-07-29 NOTE — PROVIDER NOTIFICATION
Blood cultures showed gram positive cocci in clusters and staph aureus. MD notified. No further orders. Patient's already IV antibiotic. Has LR running at 100 ml/hr. Lactic acid trended down to 1.7. Continue to monitor.

## 2023-07-29 NOTE — PROGRESS NOTES
Dr. Centeno notified via [employer] messaging about HR in 120's continuously, sepsis protocol followed with an LA of 2.5 from 2.6.    IL LR bolus ordered and repeat LA lab at 0000.  PRN tylenol given for temp of 102.9 oral.    Becca Tarango RN

## 2023-07-29 NOTE — PROGRESS NOTES
Rehabilitation Services    PT orders acknowledged. Pt is bedbound at LTC and dependent with all functional mobility, ADLs, and IADLs at baseline. Plan is to return to prior level of care with in-house PT as before, and no acute therapy needs are identified. Discussed with OT and care manager. Will DC therapy orders; thank you.     Lety Berger, DPT 7/29/2023

## 2023-07-29 NOTE — PLAN OF CARE
"  Problem: Infection  Goal: Absence of Infection Signs and Symptoms  Outcome: Progressing  Intervention: Prevent or Manage Infection  Recent Flowsheet Documentation  Taken 7/29/2023 0331 by Jessica Churchill RN  Isolation Precautions: contact precautions maintained  Taken 7/28/2023 2324 by Jessica Churchill RN  Isolation Precautions: contact precautions maintained     Problem: Plan of Care - These are the overarching goals to be used throughout the patient stay.    Goal: Patient-Specific Goal (Individualized)  Description: You can add care plan individualizations to a care plan. Examples of Individualization might be:  \"Parent requests to be called daily at 9am for status\", \"I have a hard time hearing out of my right ear\", or \"Do not touch me to wake me up as it startles me\".  7/29/2023 0753 by Jessica Churchill RN  Outcome: Progressing  7/29/2023 0753 by Jessica Churchill RN  Outcome: Progressing       Goal Outcome Evaluation:  Patient lethargic, but arousing with voice earlier during shift. Patient more alert later on during the shift. Patient is intermittently confused. Patient is disoriented to situation and place. Has sinus tachycardia. Hr 110s-120s. Patient sating adequately on 2L oxygen. RR 19-20. Patient denied any SOB. Patient c/o 10/10 back pain. Patient repositioned, ice pack applied, and prn tylenol given. Interventions not helpful per patient. One time 5 mg oxycodone ordered and given. Patient stated oxycodone more helpful. Continue to monitor.     "

## 2023-07-29 NOTE — PROGRESS NOTES
"CLINICAL NUTRITION SERVICES - ASSESSMENT NOTE     Nutrition Prescription    RECOMMENDATIONS FOR MDs/PROVIDERS TO ORDER:  Multivitamin d/t poor oral intake    Malnutrition Status:    Does not meet criteria    Recommendations already ordered by Registered Dietitian (RD):  Magic cup pm snack    Future/Additional Recommendations:  Monitor intake, weight, labs, supplement tolerance     REASON FOR ASSESSMENT  Ginna Mireles is a/an 67 year old female assessed by the dietitian for Admission Nutrition Risk Screen for positive weight loss \"unsure\" and poor appetite    NUTRITION HISTORY  Met with pt, doesn't know how her appetite is, states was poor for a few weeks PTA d/t not feeling well. Has meals provided, states was eating less than usual but does not give specifics. Has not drank supplements, does agree to magic cup.     67 year old female with a history of hypertension, hyperlipidemia, CAD, COPD, chronic pain, and type II diabetes who presents for evaluation of altered mental status.     CURRENT NUTRITION ORDERS  Diet: Moderate Consistent Carbohydrate  Intake/Tolerance: 25-50% of meals per flowsheets    LABS  Electrolytes  Potassium (mmol/L)   Date Value   07/29/2023 3.2 (L)   07/28/2023 3.5    Blood Glucose  Glucose (mg/dL)   Date Value   07/29/2023 264 (H)     GLUCOSE BY METER POCT (mg/dL)   Date Value   07/29/2023 236 (H)   07/29/2023 258 (H)   07/29/2023 295 (H)   07/29/2023 303 (H)   07/28/2023 262 (H)     Hemoglobin A1C (%)   Date Value   03/03/2016 8.9 (H)   10/08/2015 12.0 (H)   04/15/2014 6.8 (H)    Inflammatory Markers  CRP (mg/dL)   Date Value   07/03/2019 2.4 (H)   09/10/2018 1.2 (H)   07/18/2018 2.2 (H)     WBC (thou/uL)   Date Value   07/03/2019 7.9   09/10/2018 9.7   07/18/2018 9.2     WBC Count (10e3/uL)   Date Value   07/29/2023 8.9   07/28/2023 13.2 (H)     Albumin (g/dL)   Date Value   07/28/2023 2.9 (L)      Sodium (mmol/L)   Date Value   07/29/2023 138   07/28/2023 136    Renal  Urea Nitrogen (mg/dL) " "  Date Value   07/29/2023 41.3 (H)   07/28/2023 41.9 (H)     Creatinine (mg/dL)   Date Value   07/29/2023 1.32 (H)   07/28/2023 1.34 (H)   07/28/2023 1.34 (H)     Additional  Triglycerides (mg/dL)   Date Value   06/01/2017 139   05/21/2015 252 (H)   10/28/2014 115     Ketones Urine (mg/dL)   Date Value   07/28/2023 20 (A)        MEDICATIONS   [Held by provider] DULoxetine  30 mg Oral Daily    enoxaparin ANTICOAGULANT  40 mg Subcutaneous Q24H    insulin aspart  1-6 Units Subcutaneous Q4H    lamoTRIgine  200 mg Oral BID    levofloxacin  500 mg Intravenous Q24H    levothyroxine  137 mcg Oral Daily    miconazole   Topical BID    OLANZapine  2.5 mg Oral At Bedtime    pantoprazole  40 mg Oral QAM AC    potassium chloride  40 mEq Oral Once    sodium chloride (PF)  3 mL Intracatheter Q8H    vancomycin  1,250 mg Intravenous Q24H       lactated ringers 100 mL/hr at 07/29/23 0622      ANTHROPOMETRICS  Height: 165.1 cm (5' 5\")  Most Recent Weight: 96.1 kg (211 lb 13.8 oz)    IBW: 56.8 kg  BMI: Obesity Grade II BMI 35-39.9  Weight History:   Wt Readings from Last 20 Encounters:   07/29/23 96.1 kg (211 lb 13.8 oz)   09/13/18 86.2 kg (190 lb)   07/10/18 93.9 kg (207 lb)   From Care Everywhere:    90.7 kg (200 lb) 04/05/2023     90.3 kg (199 lb) 01/20/2023 1:52 PM      Dosing Weight: 66.6 kg (adj wt)    ASSESSED NUTRITION NEEDS  Estimated Energy Needs: 7749-1996 kcals/day (25 - 30 kcals/kg)  Justification: Obese  Estimated Protein Needs: 53-67 grams protein/day (0.8 - 1 grams of pro/kg)  Justification: elevated renal labs  Estimated Fluid Needs: 3160-0961 mL/day (1 mL/kcal)   Justification: Maintenance    PHYSICAL FINDINGS  See malnutrition section below.    MALNUTRITION:  % Weight Loss:  None noted  % Intake:  Decreased intake does not meet criteria for malnutrition unable to fully assess  Subcutaneous Fat Loss:  None observed  Muscle Loss:  Temporal region mild depletion  Fluid Retention:  None noted    Malnutrition Diagnosis: " Patient does not meet two of the above criteria necessary for diagnosing malnutrition    NUTRITION DIAGNOSIS  Inadequate oral intake related to poor appetite as evidenced by po intake, < 50% of meals.      INTERVENTIONS  Implementation  Nutrition Education: Not appropriate at this time due to patient condition   Magic cup pm snack     Goals  Patient to consume % of nutritionally adequate meals three times per day, or the equivalent with supplements/snacks.  Glucose < 180     Monitoring/Evaluation  Progress toward goals will be monitored and evaluated per protocol.

## 2023-07-29 NOTE — PLAN OF CARE
Problem: Plan of Care - These are the overarching goals to be used throughout the patient stay.    Goal: Optimal Comfort and Wellbeing  Intervention: Monitor Pain and Promote Comfort  Recent Flowsheet Documentation  Taken 7/28/2023 1904 by Becca Tarango, RN  Pain Management Interventions:   distraction   medication (see MAR)   food   pillow support provided   rest  Taken 7/28/2023 1730 by Becca Tarango, RN  Pain Management Interventions:   repositioned   distraction   guided imagery    Pt disoriented to time and situation.  Does remember falling, complains of back pain.  MD paged for PTA oxycodone, given.  Pt unable to lift LLE and drift noted in RLE, denies numbness or tingling.  Reports was using a walker in her care center with help with all ADL's.  Purewick in place.  S. Tach on telemetry.    Becca Tarango RN

## 2023-07-30 NOTE — PLAN OF CARE
Problem: Infection  Goal: Absence of Infection Signs and Symptoms  Outcome: Progressing  Intervention: Prevent or Manage Infection  Recent Flowsheet Documentation  Taken 7/30/2023 1106 by Eileen Lr RN  Isolation Precautions: contact precautions maintained  Taken 7/30/2023 0813 by Eileen Lr RN  Isolation Precautions: contact precautions maintained     Problem: Oral Intake Inadequate  Goal: Improved Oral Intake  Outcome: Progressing     Problem: Oral Intake Inadequate  Goal: Improved Oral Intake  Outcome: Progressing   Goal Outcome Evaluation:       Patient looks much better this afternoon had a fever of 100. Getting pain meds for low back pain. Repositioning Q2 and as patient will allow. IV antibiotic dose increased. Birmingham draining well. Oral intake better this afternoon. Patient much more alert this afternoon. Will continue to monitor.    Eileen Lr RN

## 2023-07-30 NOTE — PLAN OF CARE
Assumed care 1900 to 0730. A&O x 2, disoriented to time and situation. Assist x 2, team lift. Tele is sinus tachycardia w/ BBB. C/O back pain. Bladder scanned for >1000 mL. [place] catheter placed, bag below bladder. 2L O2 via nasal cannula. Call light within reach, able to make needs known. Bed alarm on for safety.    Problem: Oral Intake Inadequate  Goal: Improved Oral Intake  Outcome: Progressing     Problem: Infection  Goal: Absence of Infection Signs and Symptoms  Intervention: Prevent or Manage Infection  Recent Flowsheet Documentation  Taken 7/30/2023 0335 by CHASE PASCUAL  Isolation Precautions: contact precautions maintained

## 2023-07-30 NOTE — PROGRESS NOTES
SW met with pt to introduce role of CM, complete  initial assessment, and to discuss needs at time of d/c. Pt comes from [facility] and noted to be bed bound at baseline received assistance with all ADL's and IADL's. Pt is anticipated to return when medically stable and needing STR transport. Discharge date unknown at this time; [facility] ride set for 1200 for 7/31 with a  window between [phone].care management will check in with MD in AM of 7/31 to discuss if pt is medically stable to return. No PAS needed.  8:52 AM    INES Clement  7/30/2023

## 2023-07-30 NOTE — PROGRESS NOTES
[facility]    Medicine Progress Note - Hospitalist Service    Date of Admission:  7/28/2023    Assessment & Plan      Ginna Mireles is a 67 year old female w/PMHx of T2DM on insulin, liver cirrhosis, HTN, HLD, CAD, COPD, remote EtOH use disorder, bipolar disorder, and chronic pain admitted on 7/28/2023 for lethargy and fevers, found to be septic secondary to pneumonia and MSSA bacteremia.     MSSA bacteremia  Sepsis likely secondary to pneumonia  Acute metabolic encephalopathy   Patient presented  with fevers of 103 degrees and AMS. She is usually communicative and uses a walker at baseline; found down in facility. She was febrile, hypotensive, and unresponsive so nursing staff sent her to the ED. Initial workup revealed elevated lactate (now improved) and elevated WBC.  CXR showed lung hypoexpansion, concerning possible atelectasis vs PNA. CT A/P showing possible consolidation in R lung base. Patient's initial blood cultures showing MSSA; switched from vancomycin and levofloxacin antibiotics to cefazolin. Echo performed 7/29 does not show signs of vegetations.   - Cefazolin 2g Q8H  - CT chest    Urinary retention  Patient found to be retaining 2L on the night of 7/29; sol placed with output of 1600. Patient is currently having substantial urine output. Creatinine improved today from previous check.   - Continue sol   - Increase fluids to LF mIVF @ 150 ml/hr     Hyperglycemia  H/o TDM on insulin   AGMA  , serum ketone 1.7, and AG 18 on admission. Electrolytes otherwise normal, which is reassuring. VBG showed normal pH, pCO2 32, and bicarb 20. AGMA likely from lactic acidosis in setting of sepsis discussed above.   - 22u glargine daily  - MDSS   - 1:15 carb count with meals if eating  - Hold PTA canagliflozin and metformin     RLQ pain   H/o cirrhosis 2/2 EtOH use disorder   Transaminitis  Per chart review, patient has remote h/o EtOH use disorder, likely causing cirrhosis. Patient  endorsed pain in RLQ  on admission. CT abdomen also did not show ascites, which is reassuring. No acute interventions at this time. INR of 1.69. AST 91, Alk phos 183, t bili 1.3 on admission.  - CMP daily    DK - improving   Cr 1.34 on admission, likely prerenal in setting of sepsis discussed above. Baseline Cr ~0.8 - 0.9 per chart review.   - Fluids per above  - BMP daily     Hypokalemia  - RN replacement protocol    Elevated trops  Trop 37 and 35 on repeat. Likely demand ischemia in setting of infection discussed above. EKG on admission w/o acute ischemic changes.     Chronic Conditions:  HTN   - Hold PTA lisinopril 5mg daily   - Hold PTA hydrochlorothiazide 50mg daily     HLD  - Hold PTA pravastatin    Bipolar disorder    - Continue PTA lamotrigine 200mg PO BID  - Continue PTA olanzapine 2.5mg PO daily   - Hold PTA duloxetine 30mg PO daily.   - Hold PTA mirtazapine 45mg PO daily   - Hold PTA rexulti 4mg PO daily     Chronic pain  - Tylenol 500mg QID PRN  - Continue PTA oxycodone 10mg Q4H PRN  - Hold PTA flexeril 10mg TID PRN  - Hold PTA tizanidine 2mg BID Q6H PRN    Acquired hypothyroidism  - Continue PTA synthroid     Diet: Moderate Consistent Carb (60 g CHO per Meal) Diet  Snacks/Supplements Adult: Magic Cup; Between Meals    DVT Prophylaxis: Enoxaparin (Lovenox) SQ  [place] Catheter: PRESENT, indication: Retention  Lines: None     Cardiac Monitoring: ACTIVE order. Indication: Syncope- high cardiac risk (48 hours)  Code Status: Full Code      Clinically Significant Risk Factors        # Hypokalemia: Lowest K = 3.1 mmol/L in last 2 days, will replace as needed  # Hypernatremia: Highest Na = 147 mmol/L in last 2 days, will monitor as appropriate   # Hypercalcemia: Highest Ca = 10.3 mg/dL in last 2 days, will monitor as appropriate    # Hypoalbuminemia: Lowest albumin = 2.6 g/dL at 7/30/2023  4:24 AM, will monitor as appropriate    # Coagulation Defect: INR = 1.69 (Ref range: 0.85 - 1.15) and/or PTT = N/A, will  "monitor for bleeding    # Thrombocytopenia: Lowest platelets = 122 in last 2 days, will monitor for bleeding     # Hypertension: Noted on problem list        # Obesity: Estimated body mass index is 34.12 kg/m  as calculated from the following:    Height as of this encounter: 1.651 m (5' 5\").    Weight as of this encounter: 93 kg (205 lb 0.4 oz).  , PRESENT ON ADMISSION          The patient's care was discussed with the Attending Physician, Dr. Son .    Elly Centeno MD  Hospitalist Service  [facility]  ______________________________________________________________________    Interval History   Patient still not feeling well. Sleeping during exam but arouses to loud voice and knows she is in the hospital and for infection. Denies pain or shortness of breath. Was found to be retaining urine overnight and has been having copious urine output since sol placed.     Physical Exam   Vital Signs: Temp: 99.9  F (37.7  C) Temp src: Oral BP: 137/64 Pulse: 119   Resp: 20 SpO2: 95 % O2 Device: Nasal cannula Oxygen Delivery: 2 LPM  Weight: 205 lbs .44 oz  GENERAL: Somnolent but arouses to loud voice, oriented to situation when aroused enough otherwise falls back to sleep, ill appearing  EYES: Eyes grossly normal to inspection.  No discharge or erythema, or obvious scleral/conjunctival abnormalities.  RESP:  Difficult to fully assess lungs due to positioning in bed and inability to rotate but upper air fields clear  CV: Heart RRR. No murmur  Abdomen: Soft, nontender, nondistended.  MSK: No gross deformity. Normal tone.  SKIN: LLE with woody appearing without erythema or edema. Not warm to touch     Data     I have personally reviewed the following data over the past 24 hrs:    7.4  \   12.3   / 122 (L)     147 (H) 110 (H) 35.8 (H) /  212 (H)   3.3 (L) 23 1.12 (H) \     ALT: 55 (H) AST: 127 (H) AP: 159 (H) TBILI: 0.6   ALB: 2.6 (L) TOT PROTEIN: 7.2 LIPASE: N/A     Procal: N/A CRP: N/A Lactic Acid: " 1.3         Imaging results reviewed over the past 24 hrs:   No results found for this or any previous visit (from the past 24 hour(s)).

## 2023-07-30 NOTE — PROGRESS NOTES
"[facility]    Medicine Progress Note - Hospitalist Service    Date of Admission:  7/28/2023    Assessment & Plan      Ginna Mireles is a 67 year old female w/PMHx of T2DM on insulin, liver cirrhosis, HTN, HLD, CAD, COPD, remote EtOH use disorder, bipolar disorder, and chronic pain admitted on 7/28/2023 for lethargy and fevers, found to be septic and started on vancomycin and levofloxacin. Unclear source of infection at this time.      MSSA bacteremia  Sepsis likely secondary to pneumonia  Acute metabolic encephalopathy   Patient presents w/fevers of a T103F and AMS. Per nursing facility, patient hand been complaining of worsening back for 2 days. She was seen at [place] yesterday and discharged w/a muscle relaxant. Since then, she was reported to be less mobile than usual and \"in and out of sleep.\" She is usually communicative and uses a walker at baseline and found down in facility. She was febrile, hypotensive, and unresponsive so nursing staff sent her to the ED. Unclear if she hit her had or had LOC. Not on blood thinners. Initial workup was revealing elevated lactate (now improved) and elevated WBC. Received 1L NS fluid with some downtrend in lactate. CXR showed lung hypoexpansion, concerning possible atelectasis vs PNA. CT A/P showing possible consolidation in R lung base. Also considered serotonin syndrome in setting of polypharmacy - she is on several antipsychotic and mood stabilizers. No neuromuscular abnormalities, such as clonus or tremors appreciated on physical exam. Patient's initial blood cultures showing MSSA switch antibiotics to cefazolin.   - start cefazolin  - discontinue vancomycin daily, dosing per pharmacy  - discontinue levofloxacin 500mg Q24H  - echocardiogram  - LR mIVF @ 100mL/hr    Hyperglycemia  H/o TDM on insulin   AGMA  , serum ketone 1.7, and AG 18 on admission. Electrolytes otherwise normal, which is reassuring. VBG showed normal pH, pCO2 32, and bicarb " 20. AGMA likely from lactic acidosis in setting of sepsis discussed above.   - 22u glargine daily  - MDSS   - 1:15 carb count with meals if eating  - Hold PTA canagliflozin  - Hold PTA metformin     RLQ pain   H/o cirrhosis 2/2 EtOH use disorder   Transaminitis  Per chart review, patient has remote h/o EtOH use disorder, likely causing cirrhosis. Patient endorses pain in RLQ and has tenderness on palpation. Reports having ascites in the past, but no note of it in chart. CT abdomen also did not show ascites, which is reassuring. No acute interventions at this time. INR of 1.69. AST 91, Alk phos 183, t bili 1.3 on admission.  - CMP daily    DK  Cr 1.34 on admission, likely prerenal in setting of sepsis discussed above. Baseline Cr ~0.8 - 0.9 per chart review.   - Fluids per above  - BMP daily     Hypokalemia  -RN replacement protocol    Elevated trops  Trop 37 and 35 on repeat. Likely demand ischemia in setting of infection discussed above. EKG on admission w/o acute ischemic changes.     Chronic Conditions:  HTN   SBP 110s.   - Hold PTA lisinopril 5mg daily   - Hold PTA hydrochlorothiazide 50mg daily     HLD  - Hold PTA pravastatin    Bipolar disorder    - Continue PTA lamotrigine 200mg PO BID  - Continue PTA olanzapine 2.5mg PO daily   - Hold PTA duloxetine 30mg PO daily. Consider restarting tomorrow  - Hold PTA mirtazapine 45mg PO daily   - Hold PTA rexulti 4mg PO daily     Chronic pain  - Tylenol 500mg QID PRN  - Continue PTA oxycodone 10mg Q4H PRN  - Hold PTA flexeril 10mg TID PRN  - Hold PTA tizanidine 2mg BID Q6H PRN    Acquired hypothyroidism  - Continue PTA synthroid       Diet: Moderate Consistent Carb (60 g CHO per Meal) Diet  Snacks/Supplements Adult: Magic Cup; Between Meals    DVT Prophylaxis: Enoxaparin (Lovenox) SQ  [place] Catheter: Not present  Lines: None     Cardiac Monitoring: ACTIVE order. Indication: Syncope- high cardiac risk (48 hours)  Code Status: Full Code      Clinically Significant Risk  "Factors        # Hypokalemia: Lowest K = 3.2 mmol/L in last 2 days, will replace as needed    # Hypercalcemia: corrected calcium is >10.1, will monitor as appropriate    # Hypoalbuminemia: Lowest albumin = 2.9 g/dL at 7/28/2023  2:48 PM, will monitor as appropriate  # Coagulation Defect: INR = 1.69 (Ref range: 0.85 - 1.15) and/or PTT = N/A, will monitor for bleeding  # Thrombocytopenia: Lowest platelets = 126 in last 2 days, will monitor for bleeding   # Hypertension: Noted on problem list        # Obesity: Estimated body mass index is 35.26 kg/m  as calculated from the following:    Height as of this encounter: 1.651 m (5' 5\").    Weight as of this encounter: 96.1 kg (211 lb 13.8 oz)., PRESENT ON ADMISSION          Disposition Plan      Expected Discharge Date: 07/31/2023        Discharge Comments: iv abx  back to LTC?        The patient's care was discussed with the Attending Physician, Dr. Son .    Jolynn Conte MD  Hospitalist Service  [facility]  Securely message with [employer] (more info)  Text page via [employer] Paging/Directory   ______________________________________________________________________    Interval History   Patient still not feeling well. Sleeping during exam but arouses to loud voice and knows she is in the hospital and for infection. She is unable to really report what is bothering her but just states she doesn't feel well. In between conversation she falls asleep    Physical Exam   Vital Signs: Temp: 97.6  F (36.4  C) Temp src: Oral BP: (!) 158/67 Pulse: 116   Resp: 18 SpO2: 94 % O2 Device: Nasal cannula Oxygen Delivery: 2 LPM  Weight: 211 lbs 13.79 oz  GENERAL: Somnolent but arouses to loud voice, oriented to situation when aroused enough otherwise falls back to sleep, ill appearing  EYES: Eyes grossly normal to inspection.  No discharge or erythema, or obvious scleral/conjunctival abnormalities.  RESP:  Difficult to fully assess lungs due to positioning in bed and " inability to rotate but upper air fields clear  CV: Heart RRR. No murmur  Abdomen: Soft, nontender, nondistended.  MSK: No gross deformity. Normal tone.  SKIN: LLE with woody appearing without erythema or edema. Not warm to touch   NEURO: Cranial nerves grossly intact.  Mentation and speech appropriate for age.      Data     I have personally reviewed the following data over the past 24 hrs:    8.9  \   12.3   / 126 (L)     138 104 41.3 (H) /  369 (H)   4.0 20 (L) 1.32 (H) \     Procal: N/A CRP: N/A Lactic Acid: 1.7         Imaging results reviewed over the past 24 hrs:   Recent Results (from the past 24 hour(s))   Echocardiogram Complete   Result Value    LVEF  55-60%    Narrative    [id number]  [id number]  [id number]  [id number]^[address] [address]     Name: EDMUND GALAVIZ  MRN: [id number]  : 1955  Study Date: 2023 01:39 PM  Age: 67 yrs  Gender: Female  Patient Location[facility]  Reason For Study: Endocarditis  Ordering Physician: SHEN POWERS  Performed By: ERNA     BSA: 2.0 m2  Height: 65 in  Weight: 211 lb  ______________________________________________________________________________  Procedure  Complete Portable Echo Adult. Technically difficult, suboptimal study.  ______________________________________________________________________________  Interpretation Summary     The left ventricle is normal in size.  Left ventricular function is normal.The ejection fraction is 55-60%.  There is moderate mitral annular calcification.  The mitral valve leaflets appear thickened, but open well.  Technically difficult, suboptimal study  ______________________________________________________________________________  Left Ventricle  The left ventricle is normal in size. Left ventricular function is normal.The  ejection fraction is 55-60%. No regional wall motion abnormalities noted.     Right Ventricle  The right ventricle is not well visualized.     Atria  Normal  left atrial size. Right atrium not well visualized.     Mitral Valve  The mitral valve leaflets appear thickened, but open well. There is moderate  mitral annular calcification. The mitral valve is not well visualized. There  is trace mitral regurgitation.     Tricuspid Valve  The tricuspid valve is not well visualized.     Aortic Valve  The aortic valve is not well visualized. No aortic regurgitation is present.  No hemodynamically significant valvular aortic stenosis.     Pulmonic Valve  The pulmonic valve is not well visualized.     Vessels  The aorta root is normal. Normal size ascending aorta. IVC diameter <2.1 cm  collapsing >50% with sniff suggests a normal RA pressure of 3 mmHg. IVC  diameter and respiratory changes fall into an intermediate range suggesting an  RA pressure of 8 mmHg.     Pericardium  There is no pericardial effusion.  ______________________________________________________________________________  MMode/2D Measurements & Calculations  TAPSE: 1.7 cm     ______________________________________________________________________________  Report approved by: Jamison Roberto 07/29/2023 03:50 PM

## 2023-07-30 NOTE — PLAN OF CARE
"  Problem: Plan of Care - These are the overarching goals to be used throughout the patient stay.    Goal: Plan of Care Review  Description: The Plan of Care Review/Shift note should be completed every shift.  The Outcome Evaluation is a brief statement about your assessment that the patient is improving, declining, or no change.  This information will be displayed automatically on your shift note.  Outcome: Progressing  Goal: Patient-Specific Goal (Individualized)  Description: You can add care plan individualizations to a care plan. Examples of Individualization might be:  \"Parent requests to be called daily at 9am for status\", \"I have a hard time hearing out of my right ear\", or \"Do not touch me to wake me up as it startles me\".  Outcome: Progressing  Goal: Absence of Hospital-Acquired Illness or Injury  Outcome: Progressing  Intervention: Identify and Manage Fall Risk  Recent Flowsheet Documentation  Taken 7/29/2023 1500 by Tracey Garcia RN  Safety Promotion/Fall Prevention:   safety round/check completed   room door open   room near nurse's station  Intervention: Prevent Skin Injury  Recent Flowsheet Documentation  Taken 7/29/2023 1700 by Tracey Garcia RN  Body Position: refuses positioning  Goal: Optimal Comfort and Wellbeing  Outcome: Progressing  Goal: Readiness for Transition of Care  Outcome: Progressing     Problem: Infection  Goal: Absence of Infection Signs and Symptoms  Outcome: Progressing  Intervention: Prevent or Manage Infection  Recent Flowsheet Documentation  Taken 7/29/2023 1500 by Tracey Garcia RN  Isolation Precautions: contact precautions maintained     Problem: Oral Intake Inadequate  Goal: Improved Oral Intake  Outcome: Progressing     Goal Outcome Evaluation:    Disoriented to time and situation. Prn oxycodone given for back pain at 1814. Tele sinus tachycardia. 2L NC. LS clear. Bladder scanned for 1045 at 1830; straight cathed for 1900 at 1900. Purewick now in place. " Turns q2h. K protocol, AM recheck. Calls appropriately. Will continue to monitor.

## 2023-07-30 NOTE — SIGNIFICANT EVENT
4:24 AM    House staff was called by the patient's nurse due to critical result of bcx growing GPC's in clusters. Briefly, the patient was admitted for lethargy and fevers. Per chart review, patient was transitioned to cefazolin today for MSSA bacteremia. On appropriate treatment and patient vitally stable at this time. No acute interventions at this time.       Booker Olson MD PGY1  [facility]

## 2023-07-31 NOTE — PLAN OF CARE
Problem: Infection  Goal: Absence of Infection Signs and Symptoms  Outcome: Progressing  Intervention: Prevent or Manage Infection  Recent Flowsheet Documentation  Taken 7/30/2023 4509 by Mona John RN  Isolation Precautions: contact precautions maintained   Goal Outcome Evaluation:       Pt is Aox2, disoriented to time and situation. Tele is sinus tachy w/ HR in the 110's. Around 2100 pt's temp was 100.2, PRN tylenol administered, Recheck at 2230 at 100.4, will monitor. Pt also reported 10/10 back pain, PRN oxy given with relief. Lung sounds are diminished. [place] in place and output is good. LR infusing at 150ml/hr.

## 2023-07-31 NOTE — PLAN OF CARE
"0800 - Pt. Disoriented to situation. Pt. On 3L of O2. Has back pain of 8. Oxycodone was given (see MAR). Pt. Has Bilateral lower and upper extremities weakness.   0900 - Pain is now a 6.     1100 - VSS no change from AM. Pain of 6 in her back. Gave PRN tylenol. Lungs are diminished. Ran bolus of LR at 150. Pt. Denies chest pain, numbness, tingling or nausea. Pt. Disoriented to place, and situation. Bolus ran at 100 mL/hr due to high BP.     1330 - Pt. Pulled IV out. Pt. Disoriented to situation, time and place. Only oriented to self.     1420 - received critical lab result from lab. Pt. + for gram + cocci and cluster. Dr. Justin notified via [facility]. RN talked to pt. About getting a chest CT but pt. Refused. RN offered the option of getting medication before chest CT but pt. Refused.     1510 - Gave report to RN in P4. Nursing assistant will transfer pt. At 1525 PM.    Problem: Plan of Care - These are the overarching goals to be used throughout the patient stay.    Goal: Plan of Care Review  Description: The Plan of Care Review/Shift note should be completed every shift.  The Outcome Evaluation is a brief statement about your assessment that the patient is improving, declining, or no change.  This information will be displayed automatically on your shift note.  Outcome: Progressing  Flowsheets (Taken 7/31/2023 1001)  Plan of Care Reviewed With: patient  Overall Patient Progress: no change  Goal: Patient-Specific Goal (Individualized)  Description: You can add care plan individualizations to a care plan. Examples of Individualization might be:  \"Parent requests to be called daily at 9am for status\", \"I have a hard time hearing out of my right ear\", or \"Do not touch me to wake me up as it startles me\".  Outcome: Progressing  Goal: Absence of Hospital-Acquired Illness or Injury  Outcome: Progressing  Intervention: Identify and Manage Fall Risk  Recent Flowsheet Documentation  Taken 7/31/2023 0800 by Lj Lynch, " RN  Safety Promotion/Fall Prevention: safety round/check completed  Goal: Optimal Comfort and Wellbeing  Outcome: Progressing  Intervention: Monitor Pain and Promote Comfort  Recent Flowsheet Documentation  Taken 7/31/2023 0800 by jL Lynch RN  Pain Management Interventions:   medication (see MAR)   pillow support provided  Goal: Readiness for Transition of Care  Outcome: Progressing     Problem: Infection  Goal: Absence of Infection Signs and Symptoms  Outcome: Progressing  Intervention: Prevent or Manage Infection  Recent Flowsheet Documentation  Taken 7/31/2023 0800 by Lj Lynch RN  Isolation Precautions: contact precautions maintained     Problem: Oral Intake Inadequate  Goal: Improved Oral Intake  Outcome: Progressing     Goal Outcome Evaluation:      Plan of Care Reviewed With: patient    Overall Patient Progress: no changeOverall Patient Progress: no change

## 2023-07-31 NOTE — PLAN OF CARE
Problem: Infection  Goal: Absence of Infection Signs and Symptoms  Outcome: Progressing  Intervention: Prevent or Manage Infection  Recent Flowsheet Documentation  Taken 7/31/2023 0025 by Jessica Churchill RN  Isolation Precautions: contact precautions maintained       Problem: Plan of Care - These are the overarching goals to be used throughout the patient stay.    Goal: Absence of Hospital-Acquired Illness or Injury  Outcome: Progressing  Intervention: Identify and Manage Fall Risk  Recent Flowsheet Documentation  Taken 7/31/2023 0025 by Jessica Churchill RN  Safety Promotion/Fall Prevention: safety round/check completed  Intervention: Prevent Skin Injury  Recent Flowsheet Documentation  Taken 7/31/2023 0211 by Jessica Churchill RN  Body Position: (boosted up in bed)   weight shifting   legs elevated  Taken 7/31/2023 0025 by Jessica Churchill RN  Body Position: position changed independently     Goal Outcome Evaluation:    Patient is aox2. Patient is disoriented to time and situation. Patient c/o 8/10 back pain. Prn oral oxycodone given once with no relief. Patient laying on her back. Patient encouraged to shift weight. Patient refused repositioning despite education. Patient is sating adequately on 2L oxygen. Patient has sinus tachycardia. Hr 110s. Patient's BP trending up. BP 160s/70s. LR rate decreased to 100 ml/hr. MD agreeable. Continue to monitor. BC collected 7/30 gram positive for cocci in clusters. Patient on IV antibiotic. Lactic acid 1.7. Continue to monitor.

## 2023-07-31 NOTE — PLAN OF CARE
Problem: Plan of Care - These are the overarching goals to be used throughout the patient stay.    Goal: Absence of Hospital-Acquired Illness or Injury  Intervention: Identify and Manage Fall Risk  Recent Flowsheet Documentation  Taken 7/31/2023 1646 by Keshia Bermudez RN  Safety Promotion/Fall Prevention: safety round/check completed  Goal: Optimal Comfort and Wellbeing  Intervention: Monitor Pain and Promote Comfort  Recent Flowsheet Documentation  Taken 7/31/2023 1646 by Keshia Bermudez RN  Pain Management Interventions:   medication (see MAR)   pillow support provided     Problem: Infection  Goal: Absence of Infection Signs and Symptoms  Intervention: Prevent or Manage Infection  Recent Flowsheet Documentation  Taken 7/31/2023 1646 by Keshia Bermudez RN  Isolation Precautions: contact precautions maintained   Goal Outcome Evaluation:  Pt continues to have elevated sodium levels - Dr. Cintron ordered increased rate of D5.  CT of chest shows pneumonia - pt to get Ancef IV.  Pt has been fearful and anxious, particularly with any movement tonight.  [place] catheter patent with carmen urine out.

## 2023-07-31 NOTE — PROGRESS NOTES
Care Management Follow Up    Length of Stay (days): 3    Expected Discharge Date: 07/31/2023     Concerns to be Addressed:     discharge planning  Patient plan of care discussed at interdisciplinary rounds: Yes    Anticipated Discharge Disposition:  Return to [facility]     Anticipated Discharge Services:  LTC  Anticipated Discharge DME:  n/a    Patient/family educated on Medicare website which has current facility and service quality ratings: n/a   Education Provided on the Discharge Plan:  yes  Patient/Family in Agreement with the Plan:  yes    Referrals Placed by CM/SW:  n/a  Private pay costs discussed: Not applicable    Additional Information:  Pt not medically stable for discharge; on IV abx. [facility] STR cancelled and admissions of LTC notified. Care management continuing to follow for discharge planning.  9:40 AM    INES Clement  7/31/2023

## 2023-07-31 NOTE — PROGRESS NOTES
[facility] Progress Note - Hospitalist Service    Date of Admission:  7/28/2023    Assessment & Plan      Ginna Mireles is a 67 year old female w/PMHx of T2DM on insulin, liver cirrhosis, HTN, HLD, CAD, COPD, remote EtOH use disorder, bipolar disorder, and chronic pain admitted on 7/28/2023 for lethargy and fevers, found to be septic secondary to pneumonia and MSSA bacteremia.     MSSA bacteremia  Sepsis likely secondary to pneumonia  Acute metabolic encephalopathy   Patient presented  with fevers of 103 degrees and AMS. She is usually communicative and uses a walker at baseline; found down in facility. Bcx x2 grew GPC and CT AP was suggestive of a possible consolidation in the R lung base, was switched from vancomycin and levofloxacin to cefazolin. Awaiting CT chest to confirm consolidation and assess for other pathology. Echo performed 7/29 does not show signs of vegetations. She had a fever of 100.4F overnight that improved with tylenol. She continues to be lethargic this morning, but is arousable and responsive to select questions.    - Cefazolin 2g Q8H  - CT chest    Urinary retention  High UOP, likely postobstructive diuresis   UOP ~4.4L overnight. Cr wnl.   - Continue sol   - LR bolus 500mL IV   - Increase fluids to LF mIVF @ 150 ml/hr     Hypernatremia  Na 150. Likely related to high UOP discussed above. Patient is down neg 6.6L this admission.   - Fluids per above   - Na recheck in afternoon     Hyperglycemia, resolved  H/o TDM on insulin   AGMA  , serum ketone 1.7, and AG 18 on admission. Electrolytes otherwise normal, which is reassuring. VBG showed normal pH, pCO2 32, and bicarb 20. AGMA likely from lactic acidosis in setting of sepsis discussed above.   - 22u glargine daily  - MDSS   - 1:15 carb count with meals if eating  - Hold PTA canagliflozin and metformin     RLQ pain   H/o cirrhosis 2/2 EtOH use disorder   Transaminitis  Per chart review, patient has  remote h/o EtOH use disorder, likely causing cirrhosis. Patient endorsed pain in RLQ  on admission. CT abdomen also did not show ascites, which is reassuring. No acute interventions at this time. INR of 1.69. AST 91, Alk phos 183, t bili 1.3 on admission.  - CMP daily    DK - resolved   Cr 1.34 on admission, likely prerenal in setting of sepsis discussed above. Cr wnl this AM. Baseline Cr ~0.8 - 0.9 per chart review.   - Fluids per above  - BMP daily     Hypokalemia  - RN replacement protocol    Elevated trops  Trop 37 and 35 on repeat. Likely demand ischemia in setting of infection discussed above. EKG on admission w/o acute ischemic changes.     Constipation  Some abdominal discomfort this AM. Patient reports no BM since admission  - Scheduled miralax daily   - Senna PRN    Chronic Conditions:  HTN   - Continue PTA lisinopril 5mg daily   - Hold PTA hydrochlorothiazide 50mg daily     HLD  - Hold PTA pravastatin    Bipolar disorder    - Continue PTA lamotrigine 200mg PO BID  - Continue PTA olanzapine 2.5mg PO daily   - Hold PTA duloxetine 30mg PO daily.   - Hold PTA mirtazapine 45mg PO daily   - Hold PTA rexulti 4mg PO daily     Chronic pain  - Tylenol 500mg QID PRN  - Continue PTA oxycodone 10mg Q4H PRN  - Hold PTA flexeril 10mg TID PRN  - Hold PTA tizanidine 2mg BID Q6H PRN    Acquired hypothyroidism  - Continue PTA synthroid     Diet: Moderate Consistent Carb (60 g CHO per Meal) Diet  Snacks/Supplements Adult: Magic Cup; Between Meals    DVT Prophylaxis: Enoxaparin (Lovenox) SQ  [place] Catheter: PRESENT, indication: Retention  Lines: None     Cardiac Monitoring: None  Code Status: Full Code      Clinically Significant Risk Factors        # Hypokalemia: Lowest K = 3.1 mmol/L in last 2 days, will replace as needed  # Hypernatremia: Highest Na = 150 mmol/L in last 2 days, will monitor as appropriate   # Hypercalcemia: Highest Ca = 10.3 mg/dL in last 2 days, will monitor as appropriate    # Hypoalbuminemia: Lowest  "albumin = 2.6 g/dL at 7/30/2023  4:24 AM, will monitor as appropriate       # Thrombocytopenia: Lowest platelets = 114 in last 2 days, will monitor for bleeding     # Hypertension: Noted on problem list        # Obesity: Estimated body mass index is 34.6 kg/m  as calculated from the following:    Height as of this encounter: 1.651 m (5' 5\").    Weight as of this encounter: 94.3 kg (207 lb 14.3 oz).  , PRESENT ON ADMISSION          The patient's care was discussed with the Attending Physician, Dr. Son .    Booker Olson MD  Hospitalist Service  [facility]  ______________________________________________________________________    Interval History     Denies HA and dyspnea. Endorses some chest discomfort, but points closer to her epigastric region. Abdominal distension.   Falling asleep intermittently during exam.   Updated sister, Sindy about hospitalization thus far. Also discussed code status - said she talked about this with Ginna before and they would like her to be DNR/DNI.     Physical Exam   Vital Signs: Temp: 97.7  F (36.5  C) Temp src: Oral BP: (!) 174/78 Pulse: 117   Resp: 20 SpO2: 91 % O2 Device: Nasal cannula Oxygen Delivery: 2 LPM  Weight: 207 lbs 14.3 oz  GENERAL: Lethergic but arouses to loud voice, oriented to situation when aroused enough otherwise falls back to sleep, ill appearing  EYES: Eyes grossly normal to inspection.  No discharge or erythema, or obvious scleral/conjunctival abnormalities.  RESP:  Difficult to fully assess lungs due to positioning in bed and inability to rotate but upper air fields clear  CV: Heart RRR. No murmur  Abdomen: Soft, mild tenderness to palpation in epigastric region, protuberant - difficult to tell if she is distended or not.  MSK: No gross deformity. Normal tone.  SKIN: LLE with woody appearing without erythema or edema. Not warm to touch     Data     I have personally reviewed the following data over the past 24 hrs:    9.4  \   12.6   " / 114 (L)     150 (H) 113 (H) 19.8 /  173 (H)   3.6 25 0.65 \     ALT: 40 AST: 83 (H) AP: 187 (H) TBILI: 0.7   ALB: 2.7 (L) TOT PROTEIN: 7.2 LIPASE: N/A     Procal: N/A CRP: N/A Lactic Acid: 1.7         Imaging results reviewed over the past 24 hrs:   No results found for this or any previous visit (from the past 24 hour(s)).

## 2023-08-01 NOTE — PLAN OF CARE
Problem: Plan of Care - These are the overarching goals to be used throughout the patient stay.    Goal: Plan of Care Review  Description: The Plan of Care Review/Shift note should be completed every shift.  The Outcome Evaluation is a brief statement about your assessment that the patient is improving, declining, or no change.  This information will be displayed automatically on your shift note.  Outcome: Progressing  Goal: Absence of Hospital-Acquired Illness or Injury  Intervention: Prevent Skin Injury  Recent Flowsheet Documentation  Taken 8/1/2023 0020 by Zena Charles RN  Body Position: supine, head elevated  Goal: Optimal Comfort and Wellbeing  Outcome: Progressing  Intervention: Monitor Pain and Promote Comfort  Recent Flowsheet Documentation  Taken 8/1/2023 0020 by Zena Charles, RN  Pain Management Interventions:   medication (see MAR)   pillow support provided     Problem: Infection  Goal: Absence of Infection Signs and Symptoms  Outcome: Progressing     Problem: Oral Intake Inadequate  Goal: Improved Oral Intake  Outcome: Progressing   Goal Outcome Evaluation:  Pt is alert and with soft voice due to pain.VS T 97.9, RR 20, , /69, O2 sat 99% NC O2 4L. Pain 8/10 to abdomen and back. Pt moaning and grimacing. Given Oxy 10 mg, effective. BG at 0000 is 217, 2 units Novolog given. Blood culture came Positive, Gram + Cocci in clusters, HO notified no orders pt on antibiotics. BG at 02 is 259, 2 units Novolog given. BG at 04AM is 229 2 units Novolog given and Temp at 101.4F, given PRN Acetaminophen 500 mg, given sips of water, temp went down to 98.6F. Slept between cares. Pushed fluids taken atleast 150 ml this shift. Urinated on sol for 700 ml. HO visited pt at end of shift.                       Discharge instructions reviewed with patient. The patient voiced understanding of the discharge paperwork and received one prescriptions. The patient left alert and oriented with no questions or concerns.      Ginger Guillen RN  03/06/22 [id number]

## 2023-08-01 NOTE — PLAN OF CARE
Alert with soft spoken and short answers, O2 is at 4L NC, pain located in the abdomen and neck (oxycodone given with relief), afebrile throughout shift, q2 turns, sleeping between cares.   Problem: Oral Intake Inadequate  Goal: Improved Oral Intake  Outcome: Not Progressing     Problem: Gas Exchange Impaired  Goal: Optimal Gas Exchange  Outcome: Progressing  Intervention: Optimize Oxygenation and Ventilation  Recent Flowsheet Documentation  Taken 8/1/2023 1200 by Luciano Mike RN  Head of Bed (HOB) Positioning: HOB at 30 degrees  Taken 8/1/2023 1000 by Luciano Mike RN  Head of Bed (HOB) Positioning: HOB at 30 degrees  Taken 8/1/2023 0800 by Luciano Mike RN  Head of Bed (HOB) Positioning: HOB at 30 degrees     Problem: Pain Acute  Goal: Optimal Pain Control and Function  Outcome: Met  Intervention: Develop Pain Management Plan  Recent Flowsheet Documentation  Taken 8/1/2023 1127 by Luciano Mike RN  Pain Management Interventions:   medication (see MAR)   pillow support provided   Goal Outcome Evaluation:

## 2023-08-01 NOTE — PROGRESS NOTES
LINO planned for 8/2/23, approx 9am.  Pt to remain NPO after midnight in anticipation of LINO.  Please make sure IV patent in the am tomorrow.  Talked with floor nurse Luciano and updated with the plan.  Pt is confused and unable to sign her own consent.  Phone call placed to pt's sister, Sindy.  Explained LINO to Sindy, rationale, risks/benefits.  Will call Sindy tomorrow am prior to LINO for consent.  Provided Sindy with floor phone number as she had questions I could not personally answer about how the pt was doing.  Please call [facility] with questions, [phone].  Nara Perla, RN

## 2023-08-01 NOTE — PROGRESS NOTES
[facility]    Medicine Progress Note - Hospitalist Service    Date of Admission:  7/28/2023    Assessment & Plan      Ginna Mireles is a 67 year old female w/PMHx of T2DM on insulin, liver cirrhosis, HTN, HLD, CAD, COPD, remote EtOH use disorder, bipolar disorder, and chronic pain admitted on 7/28/2023 for lethargy and fevers, found to be septic with concern for pneumonia and MSSA bacteremia. Infectious disease consulted today for persistent staph bacteremia. Has had TTE with no signs of vegetation, but will obtain LINO for further evaluation. ID also recommending MRI head and spine for further evaluation of source. Exam today notable for lesions on left dorsal surface of foot, concerning for ossler nodes vs janeway lesions in the setting of MSSA bacteremia.     MSSA bacteremia with unclear source   Initial concern for pneumonia   Acute metabolic encephalopathy   Patient presented with fevers of 103 degrees and AMS. She is usually communicative and uses a walker at baseline; found down in facility. Blood cultures grew GPC and CT AP was suggestive of a possible consolidation in the R lung base, was switched from vancomycin and levofloxacin to cefazolin. TTE performed 7/29 did not show signs of vegetations. Continues to be febrile despite antibiotic coverage. ID consulted. LINO ordered to further evaluate for signs of vegetations given concern for ossler nodes on physical exam.    - ID consulted    - Proceed to LINO   - MRI head and full spine pending LINO results     - Daily blood cultures   - Cefazolin 2g Q8H    Urinary retention  Concern for post-obstructive dieresis possibly contributing to hypernatremia/   Patient retaining on admission, 2L out following straight cath. Continues to have impressive urine output possibly related to post-obstructive diuresis. Given coinciding concern for hypernatremia, will increase maintenance fluids    - Continue strict Is/Os with sol in place   - Increase  fluids to D5 @ 200 ml/hr     Hypernatremia  Most Likely 2/2 Dehydration (Post-obstructive diuresis?)   Na 149, persistent despite hypotonic fluid boluses. Likely related to high UOP discussed above.   - Fluids as above   - Na check BID: Na in PM, CMP in AM     Hyperglycemia, resolved  H/o TDM on insulin   AGMA  , serum ketone 1.7, and AG 18 on admission. Electrolytes otherwise normal, which is reassuring. VBG showed normal pH, pCO2 32, and bicarb 20. AGMA likely from lactic acidosis in setting of sepsis discussed above. Blood sugars remain elevated.   - increase to 26 u glargine daily  - MDSS   - 1:15 carb count with meals if eating  - Hold PTA canagliflozin and metformin     RLQ pain   H/o cirrhosis 2/2 EtOH use disorder   Transaminitis  Per chart review, patient has remote h/o EtOH use disorder, likely causing cirrhosis. Patient endorsed pain in RLQ  on admission. CT abdomen also did not show ascites, which is reassuring. No acute interventions at this time. INR of 1.69. AST 91, Alk phos 183, t bili 1.3 on admission. Improving, continue to trend.   - CMP daily    DK - resolved   Cr 1.34 on admission, likely prerenal in setting of sepsis discussed above. Cr wnl this AM. Baseline Cr ~0.8 - 0.9 per chart review.    - Fluids as above    - Daily CMP     Hypokalemia  - RN replacement protocol    Elevated trops  Trop 37 and 35 on repeat. Likely demand ischemia in setting of infection discussed above. EKG on admission w/o acute ischemic changes.     Constipation  - Scheduled miralax daily   - Senna PRN    Chronic Conditions:  HTN   - Continue PTA lisinopril 5mg daily   - Hold PTA hydrochlorothiazide 50mg daily     HLD  - Hold PTA pravastatin    Bipolar disorder    - Continue PTA lamotrigine 200mg PO BID  - Continue PTA olanzapine 2.5mg PO daily   - Hold PTA duloxetine 30mg PO daily.   - Hold PTA mirtazapine 45mg PO daily   - Hold PTA rexulti 4mg PO daily     Chronic pain  - Tylenol 500mg QID PRN  - Continue PTA  "oxycodone 10mg Q4H PRN  - Hold PTA flexeril 10mg TID PRN  - Hold PTA tizanidine 2mg BID Q6H PRN    Acquired hypothyroidism  - Continue PTA synthroid     Diet: Moderate Consistent Carb (60 g CHO per Meal) Diet  Snacks/Supplements Adult: Magic Cup; Between Meals    DVT Prophylaxis: Enoxaparin (Lovenox) SQ  [place] Catheter: PRESENT, indication: Retention  Lines: None     Cardiac Monitoring: None  Code Status: No CPR- Do NOT Intubate      Clinically Significant Risk Factors        # Hypokalemia: Lowest K = 3.2 mmol/L in last 2 days, will replace as needed  # Hypernatremia: Highest Na = 152 mmol/L in last 2 days, will monitor as appropriate   # Hypercalcemia: Highest Ca = 10.2 mg/dL in last 2 days, will monitor as appropriate    # Hypoalbuminemia: Lowest albumin = 2.2 g/dL at 8/1/2023  9:37 AM, will monitor as appropriate       # Thrombocytopenia: Lowest platelets = 105 in last 2 days, will monitor for bleeding     # Hypertension: Noted on problem list        # Obesity: Estimated body mass index is 34.6 kg/m  as calculated from the following:    Height as of this encounter: 1.651 m (5' 5\").    Weight as of this encounter: 94.3 kg (207 lb 14.3 oz).  , PRESENT ON ADMISSION          The patient's care was discussed with Dr Sebas Roque MD  Hospitalist Service  [facility]  ______________________________________________________________________    Interval History   Overnight events reviewed, patient remained hypernatremic despite hypotonic fluid boluses. Spoke a fever despite antibiotics. Reports she feels \"miserable\". Appears very uncomfortable.     Physical Exam   Vital Signs: Temp: 99.7  F (37.6  C) Temp src: Axillary BP: 138/62 Pulse: 120   Resp: 22 SpO2: 94 % O2 Device: Nasal cannula Oxygen Delivery: 4 LPM  Weight: 207 lbs 14.3 oz  GENERAL: Lethergic but arouses to loud voice, ill appearing. Limited ability to participate in conversation.   EYES: Eyes grossly normal to inspection.  "   RESP:  Difficult to fully assess lungs due to positioning in bed and inability to rotate but upper air fields clear  CV: Systolic murmur heard over right sternal border, radiated to clavicle. No murmur heard of mitral position. Regular rate.   Abdomen: Soft, mild tenderness to palpation in epigastric region.   MSK: No gross deformity. Normal tone.  SKIN: Dorsal surface of left foot with atypical ecchymosis across toes 2-4, concerning for ossler nodes. Tender to touch. Right foot diffusely tender. Bilateral palmar surfaces of hands diffusely erythematous but non-tender.     Data     I have personally reviewed the following data over the past 24 hrs:    10.4  \   12.4   / 105 (L)     149 (H); 149 (H) 114 (H) 18.1 /  211 (H)   3.2 (L) 27 0.58 \     ALT: 22 AST: 46 (H) AP: 165 (H) TBILI: 0.5   ALB: 2.2 (L) TOT PROTEIN: 6.5 LIPASE: N/A       Imaging results reviewed over the past 24 hrs:   Recent Results (from the past 24 hour(s))   CT Chest w/o Contrast    Narrative    EXAM: CT CHEST W/O CONTRAST  LOCATION: [facility]  DATE: 7/31/2023    INDICATION: Fever, lethargy and sepsis. Pneumonia  COMPARISON: None.  TECHNIQUE: CT chest without IV contrast. Multiplanar reformats were obtained. Dose reduction techniques were used.  CONTRAST: None.    FINDINGS:   LUNGS AND PLEURA: Consolidation/atelectasis right lower lobe consistent with pneumonia. Tiny right pleural effusion.  A few minimal patchy foci of airspace disease also within upper lobes. Emphysema.    MEDIASTINUM/AXILLAE: No lymphadenopathy. No thoracic aortic aneurysm.    CORONARY ARTERY CALCIFICATION: Moderate.    UPPER ABDOMEN: Cholecystectomy. Cirrhosis and splenomegaly.    MUSCULOSKELETAL: Prior spinal fusions.      Impression    IMPRESSION:   1.  Pneumonia with consolidation of right lower lobe and a tiny right pleural effusion.  2.  Slight patchy inflammatory foci also within upper lobes.  3.  Emphysema.

## 2023-08-01 NOTE — SIGNIFICANT EVENT
Significant Event Note    Time of event: 12:57 AM August 1, 2023    Description of event:  Notified blood cultures growing gram-positive cocci in clusters.  Patient with known MSSA bacteremia, on cefazolin.      Plan:  Repeat daily cultures in the a.m.        Suman Cintron MD  House

## 2023-08-01 NOTE — SIGNIFICANT EVENT
Significant Event Note    Time of event: 7:56 PM July 31, 2023    Description of event:  Sodium elevated at 151.    Briefly, 67-year-old history of diabetes, cirrhosis, hypertension, CAD.  Admitted with pneumonia and MSSA bacteremia.    Has had significant hypernatremia, suspected postobstructive diuresis.  Down about 6-1/2 L since admission.    Repeat sodium this evening of 151.  Received responded to fluid boluses.  Urine studies need to be collected at    Plan:  Will repeat 500 mL LR bolus.  We will increase maintenance fluid to 150 mL/h with suspected continued fluid loss.  -Monitor fluid status.    Discussed with: bedside nurse    8:38 PM  Discussed with Dr Justin, likely free water deficit. Will discontinue LR.  - start D5 at 100mL/hr   - Recheck sodium every 4 hours.       11:24 PM   Sodium at 152, D5 had only been running for about an hour prior to this draw.  Will increase rate to 150 mL/h.   - Continue to follow sodium every 4 hours      2:41 AM (8/1/23)  Sodium starting to down trend. Continue with D5 at 150mL/hr        MD Homer Dorado

## 2023-08-01 NOTE — CONSULTS
"Infectious Disease Consultation:  Requesting MD:  Reason for consult:      HISTORY:  Pt to ED from NH with confusion, fever to 104 and back pain.  Tells me she has HW in her lower spine.  Unsure when this was placed.  She's gone on here to have SIX consecutive + BC for MSSA with to date, no clear source of infection.  Based on xray she has cervial and lumbar spinal HW in place.  Asked to see.      Exam is notable for murmur and possible embolic lesions on her foot and maybe L hand          Pertinent past history, past infectious disease history:       PAST MEDICAL HISTORY:  Past Medical History   No past medical history on file.        PAST SURGICAL HISTORY:  Past Surgical History         Past Surgical History:   Procedure Laterality Date    CHOLECYSTECTOMY        HYSTERECTOMY        NECK SURGERY        TONSILLECTOMY                     Medications:  Reviewed prior to admission meds as applicable in chart review.  Current meds are reviewed in the EMR listed MAR.     ANTIBIOTICS:    Current:ancef   Prior:   Allergy to:    SH/FH and  travel history(if applicable to consult):    FAMILY HISTORY:  Family History         Family History   Problem Relation Age of Onset    Cataracts Mother      Lung Cancer Father      HIV/AIDS Brother      Diabetes Sister              SOCIAL HISTORY:   Social History              Socioeconomic History    Marital status:    Tobacco Use    Smoking status: Never    Smokeless tobacco: Never      No drugs, alcohol, or tobacco.     REVIEW OF SYSTEMS:  All other systems negative    EXAMINATION:  /62 (BP Location: Left arm)   Pulse 120   Temp 99.7  F (37.6  C) (Axillary)   Resp 22   Ht 1.651 m (5' 5\")   Wt 94.3 kg (207 lb 14.3 oz)   SpO2 94%   BMI 34.60 kg/m    Alert, awake  Vitals tabulated above, reviewed  HEENT:favoring the R side. Nl exam  Neck supple without lymphadenopathy  Sclera clear  CARDIOVASCULAR regular rate and rhythm, 3/6 murmur  Lungs CLEAR TO AUSCULTATION "   Abdomen soft, NT/ND, absent HEPATOSPLENOMEGALY  Skin normal  Joints normal  Neurologic exam non focal  Wound:  NA  Skin:  possible janeway's on the L foot, hand      CLINICAL DATABASE FOR---LAB/MICRO/CULTURES/IMAGING STUDIES:  Lab Results   Component Value Date    WBC 10.4 08/01/2023     Lab Results   Component Value Date    RBC 4.22 08/01/2023     Lab Results   Component Value Date    HGB 12.4 08/01/2023     Lab Results   Component Value Date    HCT 38.2 08/01/2023     No components found for: MCT  Lab Results   Component Value Date    MCV 91 08/01/2023     Lab Results   Component Value Date    MCH 29.4 08/01/2023     Lab Results   Component Value Date    MCHC 32.5 08/01/2023     Lab Results   Component Value Date    RDW 16.9 08/01/2023     Lab Results   Component Value Date     08/01/2023       Last Comprehensive Metabolic Panel:  Sodium   Date Value Ref Range Status   08/01/2023 149 (H) 136 - 145 mmol/L Final   08/01/2023 149 (H) 136 - 145 mmol/L Final     Potassium   Date Value Ref Range Status   08/01/2023 3.2 (L) 3.4 - 5.3 mmol/L Final     Chloride   Date Value Ref Range Status   08/01/2023 114 (H) 98 - 107 mmol/L Final     Carbon Dioxide (CO2)   Date Value Ref Range Status   08/01/2023 27 22 - 29 mmol/L Final     Anion Gap   Date Value Ref Range Status   08/01/2023 8 7 - 15 mmol/L Final     Glucose   Date Value Ref Range Status   08/01/2023 245 (H) 70 - 99 mg/dL Final     GLUCOSE BY METER POCT   Date Value Ref Range Status   08/01/2023 256 (H) 70 - 99 mg/dL Final     Urea Nitrogen   Date Value Ref Range Status   08/01/2023 18.1 8.0 - 23.0 mg/dL Final     Creatinine   Date Value Ref Range Status   08/01/2023 0.58 0.51 - 0.95 mg/dL Final     GFR Estimate   Date Value Ref Range Status   08/01/2023 >90 >60 mL/min/1.73m2 Final     Calcium   Date Value Ref Range Status   08/01/2023 9.7 8.8 - 10.2 mg/dL Final       Liver Function Studies -   Recent Labs   Lab Test 08/01/23  0937   PROTTOTAL 6.5   ALBUMIN  2.2*   BILITOTAL 0.5   ALKPHOS 165*   AST 46*   ALT 22       Erythrocyte Sedimentation Rate   Date Value Ref Range Status   07/03/2019 47 (H) 0 - 20 mm/hr Final       CRP   Date Value Ref Range Status   07/03/2019 2.4 (H) 0.0 - 0.8 mg/dL Final         6 + BC this admit for MSSA including yesterday's draw      IMPRESSION:  Hi grade MSSA bacteremia we do not have as yet an obvious source of this infection  Fevers    PLAN:  Proceed to LINO  Then MRI head and full spine  Continue to check BC daily        HI HUNTER MD  [facility]  Office [phone]

## 2023-08-02 NOTE — PROGRESS NOTES
[facility]    Medicine Progress Note - Hospitalist Service    Date of Admission:  7/28/2023    Assessment & Plan      Ginna Mireles is a 67 year old female w/PMHx of T2DM on insulin, liver cirrhosis, HTN, HLD, CAD, COPD, remote EtOH use disorder, bipolar disorder, and chronic pain admitted on 7/28/2023 for lethargy and fevers, found to be septic with MSSA bacteremia and unknown source. LINO completed on 8/2/23 and did not show vegetations. Will obtain MRI head and spine complete to further investigate possible source. Results pending. ID continues to follow.     MSSA bacteremia with unclear source   LINO without vegetations   Back pain, concern for skeletal source   Acute metabolic encephalopathy   Patient presented with fevers of 103 degrees and AMS. She is usually communicative and uses a walker at baseline; found down in facility. Positive blood culture x 6 concerning for high grade MSSA bacteremia. TTE performed 7/29 did not show signs of vegetations. ID recommending MRI of full spine and head to further investigate for infectious source. Patient with concerning complaint of low back pain.    - ID consulted    - LINO completed 8/2/23 and negative for vegetations    - MRI head and spine     - Completed MRI lumbar and thoracic spine today 8/2/23     - Anticipate MRI cervical spine and head tomorrow     - Daily blood cultures   - Cefazolin 2g Q8H    Urinary retention  Concern for post-obstructive dieresis possibly contributing to hypernatremia  Hypernatremia, improving   Patient retaining on admission, 2L out following straight cath. Initial high urine output concerning for post operative diuresis. Given coinciding concern for hypernatremia (improving), will continue with hyponatremic maintenance fluids.    - Continue strict Is/Os with sol in place   - Decrease fluids to D5 @ 100 ml/hr   - Trend sodium with daily BMP   - Monitor fluid status, consider decreasing mIVF if concern for fluid  overload.     Type II Diabetes   Patient with known diagnosis of type II diabetes. She did have an AGMA on admission, but felt to be more likely 2/2 lactic acidosis. Home diabetes regimen is as follows: 34 units lantus HS + 13 units aspart at breakfast + 14 units aspart at lunch + 34 aspart at dinner + metformin + bydureon + canagliflozin. Blood sugars suboptimally controlled throughout admission thus far. Will increase both basal and bolus dosing. Hemoglobin A1c on this admission is 8.2.    - Hold PTA metformin, bydureon, canagliflozin.    - 35 units lantus HS    - apart 1:10 carb counting with meals    - medium resistance sliding scale     RLQ pain   H/o cirrhosis 2/2 EtOH use disorder   Transaminitis, improving   Per chart review, patient has remote h/o EtOH use disorder, likely causing cirrhosis. Patient endorsed pain in RLQ  on admission. CT abdomen also did not show ascites, which is reassuring. No acute interventions at this time. INR of 1.69. AST 91, Alk phos 183, t bili 1.3 on admission. Improving, continue to trend.   - CMP daily    DK - resolved   Cr 1.34 on admission, likely prerenal in setting of sepsis discussed above. Cr wnl this AM. Baseline Cr ~0.8 - 0.9 per chart review.    - Fluids as above    - Daily CMP     Hypokalemia  - RN replacement protocol    Elevated trops  Trop 37 and 35 on repeat on admission. Likely demand ischemia in setting of infection discussed above. EKG on admission w/o acute ischemic changes.     Constipation  - Scheduled miralax daily   - Senna PRN    Chronic Conditions:  HTN   - Continue PTA lisinopril 5mg daily   - Hold PTA hydrochlorothiazide 50mg daily     HLD  - Hold PTA pravastatin    Bipolar disorder    - Continue PTA lamotrigine 200mg PO BID  - Continue PTA olanzapine 2.5mg PO daily   - Hold PTA duloxetine 30mg PO daily.   - Hold PTA mirtazapine 45mg PO daily   - Hold PTA rexulti 4mg PO daily     Chronic pain  - Tylenol 500mg QID PRN  - Continue PTA oxycodone 10mg Q4H  "PRN  - Hold PTA flexeril 10mg TID PRN  - Hold PTA tizanidine 2mg BID Q6H PRN    Acquired hypothyroidism  - Continue PTA synthroid     Diet: Snacks/Supplements Adult: Magic Cup; Between Meals  NPO for Medical/Clinical Reasons Except for: Meds    DVT Prophylaxis: Enoxaparin (Lovenox) SQ  [place] Catheter: PRESENT, indication: Retention  Lines: None     Cardiac Monitoring: None  Code Status: No CPR- Do NOT Intubate      Clinically Significant Risk Factors        # Hypokalemia: Lowest K = 3.2 mmol/L in last 2 days, will replace as needed  # Hypernatremia: Highest Na = 152 mmol/L in last 2 days, will monitor as appropriate   # Hypercalcemia: Highest Ca = 10.2 mg/dL in last 2 days, will monitor as appropriate    # Hypoalbuminemia: Lowest albumin = 2.2 g/dL at 8/1/2023  9:37 AM, will monitor as appropriate       # Thrombocytopenia: Lowest platelets = 105 in last 2 days, will monitor for bleeding     # Hypertension: Noted on problem list       # DMII: A1C = 8.2 % (Ref range: <5.7 %) within past 6 months   # Obesity: Estimated body mass index is 34.6 kg/m  as calculated from the following:    Height as of this encounter: 1.651 m (5' 5\").    Weight as of this encounter: 94.3 kg (207 lb 14.3 oz).             The patient's care was discussed with Dr Sebas Roque MD  Hospitalist Service  [facility]  ______________________________________________________________________    Interval History   Overnight events reviewed. Patient with no true fevers overnight. Continues to feel generally poor, endorses back pain, most significant in mid to low back. LINO completed today.    Physical Exam   Vital Signs: Temp: 98.4  F (36.9  C) Temp src: Axillary BP: (!) 127/97 Pulse: 103   Resp: 20 SpO2: 94 % O2 Device: Nasal cannula Oxygen Delivery: 2 LPM  Weight: 207 lbs 14.3 oz  GENERAL: Lethergic but arouses to loud voice, ill appearing. Limited ability to participate in conversation.   EYES: Eyes grossly " normal to inspection.    RESP:  Difficult to fully assess lungs due to positioning in bed and inability to rotate but upper air fields clear  CV: Systolic murmur heard over right sternal border, radiated to clavicle. No murmur heard of mitral position. Regular rate.   Abdomen: Soft, mild tenderness to palpation in epigastric region.   MSK: No gross deformity. Normal tone.  SKIN: Dorsal surface of left foot with atypical ecchymosis across toes 2-4, concerning for ossler nodes. Tender to touch. Right foot diffusely tender. Bilateral palmar surfaces of hands diffusely erythematous but non-tender.     Data     I have personally reviewed the following data over the past 24 hrs:    9.1  \   12.3   / 112 (L)     146 (H); 146 (H) 110 (H) 15.9 /  235 (H)   3.7 28 0.57 \     ALT: 17 AST: 41 AP: 176 (H) TBILI: 0.6   ALB: 2.3 (L) TOT PROTEIN: 6.8 LIPASE: N/A     TSH: N/A T4: N/A A1C: 8.2 (H)     Procal: N/A CRP: N/A Lactic Acid: 2.0         Imaging results reviewed over the past 24 hrs:   Recent Results (from the past 24 hour(s))   Echocardiogram LNIO   Result Value    LVEF  60-65%    Narrative    [id number]  [facility]  [id number]  [id number]^EVITA^[place] [address]     Name: EDMUND GALAVIZ  MRN: [id number]  : 1955  Study Date: 2023 08:52 AM  Age: 67 yrs  Gender: Female  Patient Location: [facility]  Reason For Study: Fever  Ordering Physician: BRIANNA JAMA  Referring Physician: BRIANNA JAMA  Performed By: SARAH/DANIEL     ______________________________________________________________________________  Procedure  Complete LINO Adult.  ______________________________________________________________________________  Interpretation Summary     No valvular vegetation identified.  The visual ejection fraction is 60-65%.  The right ventricle is normal in size and function.  Atrial septum is aneurysmal.  PFO present with spontaneous left to right shunt.  A contrast injection  (Bubble Study) was performed that was mildly positive for  flow across the interatrial septum.  ______________________________________________________________________________  LINO  Consent to the procedure was obtained prior to sedation. Prior to the exam,  the oral cavity was checked and no overcrowding was noted. The transesophageal  probe was passed without difficulty. There were no complications associated  with this procedure. Patient was sedated using Fentanyl 100 mcg. Patient was  sedated using Versed 4 mg. The heart rate, respiratory rate, oxygen  saturations, blood pressure, and response to care were monitored throughout  the procedure with the assistance of the nurse. I determined this patient to  be an appropriate candidate for the planned sedation and procedure and have  reassessed the patient immediately prior to sedation and procedure. Total  sedation time: 42 minutes of continuous bedside 1:1 monitoring. 1ml of  hurricaine spray, 15ml of lidocaine.     Left Ventricle  The left ventricle is normal in size. There is normal left ventricular wall  thickness. Left ventricular systolic function is normal. The visual ejection  fraction is 60-65%. No regional wall motion abnormalities noted.     Right Ventricle  The right ventricle is normal in size and function.     Atria  Normal left atrial size. Right atrial size is normal. PFO present with  spontaneous left to right shunt. A contrast injection (Bubble Study) was  performed that was mildly positive for flow across the interatrial septum. The  atrial septum is aneurysmal. No thrombus is detected in the left atrial  appendage.     Mitral Valve  Mitral valve leaflets appear normal. There is no vegetation seen on the mitral  valve. There is no mitral regurgitation noted.     Tricuspid Valve  Normal tricuspid valve. There is no vegetation on the tricuspid valve. There  is trace tricuspid regurgitation.     Aortic Valve  The aortic valve is normal in structure  and function. There is no vegetation  on the aortic valve.     Pulmonic Valve  Normal pulmonic valve. There is no vegetation on the pulmonic valve. There is  trace pulmonic valvular regurgitation.     Vessels  Normal size aorta.     Pericardial/Pleural  There is no pericardial effusion.     ______________________________________________________________________________  Report approved by: Jamison Sheets 08/02/2023 10:16 AM     ______________________________________________________________________________

## 2023-08-02 NOTE — PLAN OF CARE
Problem: Plan of Care - These are the overarching goals to be used throughout the patient stay.    Goal: Plan of Care Review  Description: The Plan of Care Review/Shift note should be completed every shift.  The Outcome Evaluation is a brief statement about your assessment that the patient is improving, declining, or no change.  This information will be displayed automatically on your shift note.  Outcome: Progressing   Goal Outcome Evaluation:       Patient c/o back pain with repositioning but pain free at rest. IV fluids and IV antibiotics as ordered. Good urine output. VSS. She kept asking for water but was reminded that she is NPO for a LINO. She is unhappy that she can't drink and has stated that she doesn't want the test done. She was told that she can discuss it with the doctor in the morning but for now we are going to keep her NPO.

## 2023-08-02 NOTE — PLAN OF CARE
Problem: Plan of Care - These are the overarching goals to be used throughout the patient stay.    Goal: Absence of Hospital-Acquired Illness or Injury  Intervention: Identify and Manage Fall Risk  Recent Flowsheet Documentation  Taken 8/1/2023 1641 by Keshia Bermudez RN  Safety Promotion/Fall Prevention: safety round/check completed  Goal: Optimal Comfort and Wellbeing  Intervention: Monitor Pain and Promote Comfort  Recent Flowsheet Documentation  Taken 8/1/2023 1641 by Keshia Bermudez RN  Pain Management Interventions:   medication (see MAR)   pillow support provided     Problem: Infection  Goal: Absence of Infection Signs and Symptoms  Intervention: Prevent or Manage Infection  Recent Flowsheet Documentation  Taken 8/1/2023 1641 by Keshia Bermudez RN  Isolation Precautions: contact precautions maintained     Problem: Pain Acute  Goal: Optimal Pain Control and Function  Intervention: Develop Pain Management Plan  Recent Flowsheet Documentation  Taken 8/1/2023 1641 by Keshia Bermudez RN  Pain Management Interventions:   medication (see MAR)   pillow support provided  Intervention: Prevent or Manage Pain  Recent Flowsheet Documentation  Taken 8/1/2023 1641 by Keshia Bermudez RN  Medication Review/Management: medications reviewed   Goal Outcome Evaluation:  Pt had a temp of 101.7 at start of shift.  Fan and ice applied and Tylenol given and no further temp this shift.  Pt has diminished resps with O2 sats in the 90s with O2 on 4 LPM per NC.  Due to temp and RR of 20 BPA for sepsis fired - pt is already on antibiotic.  Lactic acid was 1.6.  Pt continues to have elevated sodium.  She continues on D5 at 200 ml/h.  She has gotten Ancef this shift.  She is schedled for an Echo LINO in the morning.  She is NPO at midnight.  She had a poor appetite but after 2nd dose of PRN Tylenol she did accept HS snack of Magic Cup.  Lantus insulin given and sliding scale given for elevated glucose.

## 2023-08-02 NOTE — PROGRESS NOTES
PT TO BE NPO TILL 1050.  NO HOT FOOD OR LIQUIDS TILL 1550 TODAY.  REPORT PER DR HORTON.  RIGHT ARM SWOLLEN DUE TO INFILTRATION,  JULISA RICE AND DR JAMA NOTIFIED.  VITAL SIGNS STABLE O2 STABLE ON 4 LITERS NC TO CHANGES.    YANIRA ARREGUIN RN

## 2023-08-02 NOTE — PRE-PROCEDURE
GENERAL PRE-PROCEDURE:   Date/Time:  8/2/2023 9:06 AM    Verbal consent obtained?: Yes    Written consent obtained?: Yes    Risks and benefits: Risks, benefits and alternatives were discussed    Consent given by:  Patient  Patient states understanding of procedure being performed: Yes    Patient's understanding of procedure matches consent: Yes    Procedure consent matches procedure scheduled: Yes    Expected level of sedation:  Moderate  Appropriately NPO:  Yes  Mallampati  :  Grade 2- soft palate, base of uvula, tonsillar pillars, and portion of posterior pharyngeal wall visible  Lungs:  Lungs clear with good breath sounds bilaterally  Heart:  Normal heart sounds and rate  History & Physical reviewed:  History and physical reviewed and no updates needed  Statement of review:  I have reviewed the lab findings, diagnostic data, medications, and the plan for sedation

## 2023-08-02 NOTE — PROGRESS NOTES
Notified by RN that patient is not tolerating MRI 2/2 back pain. Will give one time dose of Ativan. Could consider Zyprexa if this intervention is not successful. Ultimately may need to consider full sedation for MRI given the importance of obtaining these images to rule out skeletal source of bacteremia.     Cyndie Roque MD PGY-2  [facility]

## 2023-08-02 NOTE — PROGRESS NOTES
"[facility] Progress Note    SUBJECTIVE:  pt had echo done.  Resting in bed.  L foot is about the same today.  She is deconditioned.       REVIEW OF SYSTEMS:  Negative unless as listed above.  Social history, Family history, Medications: reviewed.    OBJECTIVE:  BP (!) 127/97 (BP Location: Left arm)   Pulse 103   Temp 98.4  F (36.9  C) (Axillary)   Resp 20   Ht 1.651 m (5' 5\")   Wt 94.3 kg (207 lb 14.3 oz)   SpO2 94%   BMI 34.60 kg/m                PHYSICAL EXAM:  Alert, awake  Vitals tabulated above, reviewed  Neck supple without lymphadenopathy  Sclera normal color, not injected  CARDIOVASCULAR regular rate and rhythm, 2/6 sys murmur  Lungs CLEAR TO AUSCULTATION   Abdomen soft, NT/ND, absent HEPATOSPLENOMEGALY  Skin normal with findings in the consult noted again  Joints normal  Neurologic exam non focal    Antibiotics:ancef    Pertinent labs:  Lab Results   Component Value Date    WBC 9.1 08/02/2023     Lab Results   Component Value Date    RBC 4.22 08/02/2023     Lab Results   Component Value Date    HGB 12.3 08/02/2023     Lab Results   Component Value Date    HCT 38.5 08/02/2023     No components found for: MCT  Lab Results   Component Value Date    MCV 91 08/02/2023     Lab Results   Component Value Date    MCH 29.1 08/02/2023     Lab Results   Component Value Date    MCHC 31.9 08/02/2023     Lab Results   Component Value Date    RDW 17.0 08/02/2023     Lab Results   Component Value Date     08/02/2023       Last Comprehensive Metabolic Panel:  Sodium   Date Value Ref Range Status   08/02/2023 146 (H) 136 - 145 mmol/L Final   08/02/2023 146 (H) 136 - 145 mmol/L Final     Potassium   Date Value Ref Range Status   08/02/2023 3.7 3.4 - 5.3 mmol/L Final     Chloride   Date Value Ref Range Status   08/02/2023 110 (H) 98 - 107 mmol/L Final     Carbon Dioxide (CO2)   Date Value Ref Range Status   08/02/2023 28 22 - 29 mmol/L Final     Anion Gap   Date Value Ref Range Status   08/02/2023 " 8 7 - 15 mmol/L Final     Glucose   Date Value Ref Range Status   08/02/2023 346 (H) 70 - 99 mg/dL Final     GLUCOSE BY METER POCT   Date Value Ref Range Status   08/02/2023 286 (H) 70 - 99 mg/dL Final     Urea Nitrogen   Date Value Ref Range Status   08/02/2023 15.9 8.0 - 23.0 mg/dL Final     Creatinine   Date Value Ref Range Status   08/02/2023 0.57 0.51 - 0.95 mg/dL Final     GFR Estimate   Date Value Ref Range Status   08/02/2023 >90 >60 mL/min/1.73m2 Final     Calcium   Date Value Ref Range Status   08/02/2023 9.4 8.8 - 10.2 mg/dL Final       Liver Function Studies -   Recent Labs   Lab Test 08/02/23  0541   PROTTOTAL 6.8   ALBUMIN 2.3*   BILITOTAL 0.6   ALKPHOS 176*   AST 41   ALT 17       Erythrocyte Sedimentation Rate   Date Value Ref Range Status   07/03/2019 47 (H) 0 - 20 mm/hr Final       CRP   Date Value Ref Range Status   07/03/2019 2.4 (H) 0.0 - 0.8 mg/dL Final               MICROBIOLOGY DATA:    Aug 1 micro to date neg, prior with 6 consecutive + BC    IMAGING/RADIOLOGY:          ASSESSMENT:  MSSA bacteremia hi grade  Back pain  Clean LINO    RECOMMENDATION:  Imaging of spine would be next, if she has cleared blood may not be critical to do.   Probably will treat 6 wks as spine could be seeded.   Will follow    HI Erniquez MD  Office [phone] option 2 to desk staff

## 2023-08-02 NOTE — PLAN OF CARE
Goal Outcome Evaluation:       Pt. Alert, confused, ECHO LINO completed this shift, remains NPO, plan for MRI this afternoon, BG this shift 286 and 235, sol patent, remains on IV fluids, K+ protocol for recheck in am, will cont to monitor.

## 2023-08-02 NOTE — PROGRESS NOTES
MD updated that pt. Did not tolerate MRI this afternoon, MD ordered 1x dose oral Ativan to be given And to update MD if intervention was successful after about an hour.

## 2023-08-03 NOTE — PLAN OF CARE
Problem: Plan of Care - These are the overarching goals to be used throughout the patient stay.    Goal: Patient-Specific Goal (Individualized)  Outcome: Progressing     Problem: Gas Exchange Impaired  Goal: Optimal Gas Exchange  Outcome: Progressing   Goal Outcome Evaluation:                    Pt restless and groaning at beginning of shift despite one time administration of ativan. MD updated, additional medication ordered and administered. Pt brought down to MRI after medication administration. Pt groaning during transfer.

## 2023-08-03 NOTE — PROGRESS NOTES
[facility]    Medicine Progress Note - Hospitalist Service    Date of Admission:  7/28/2023    Assessment & Plan      Ginna Mireles is a 67 year old female w/PMHx of T2DM on insulin, liver cirrhosis, HTN, HLD, CAD, COPD, remote EtOH use disorder, bipolar disorder, and chronic pain admitted on 7/28/2023 for lethargy and fevers, found to be septic with MSSA bacteremia and unknown source. LINO completed on 8/2/23 and did not show vegetations. MRI lumbar and thoracic with concern for epidural abscess. ID and Neurosurgery do not feel this requires surgical intervention. Will continue IV antibiotics, will be prolonged course and PICC placement for outpatient continuation. Blood cultures negative x 3 days.    MSSA bacteremia  LION without vegetations   MRI with concern for small epidural abscess  Acute metabolic encephalopathy   Patient presented with fevers of 103 degrees and AMS. She is usually communicative and uses a walker at baseline; found down in facility. Positive blood culture x 4 days concerning for high grade MSSA bacteremia. TTE performed 7/29 did not show signs of vegetations. MRI lumbar and thoracic did reveal area of fluid collection concerning for possible epidural abscess, but ID and neurosurgery agree that this is not an operative infection. Will continue IV antibiotics, expect prolonged course.    - ID consulted    - LINO completed 8/2/23 and negative for vegetations    - MRI lumbar and thoracic with concerns for small epidural abscess, non-operative    - MRI cervical and head not obtained, patient refusing     - Continue daily blood cultures   - Continue cefazolin 2g Q8H  - ID to coordinate PICC placement   - Neurosurgery consulted    - Agree with non-operative infection as noted above   - Awaiting final recommendations     Urinary retention, resolved   Concern for post-obstructive dieresis possibly contributing to hypernatremia  Hypernatremia, improving   Patient retaining on  admission, 2L out following straight cath. Initial high urine output concerning for post operative diuresis. mIVF D5 given to counteract diuresis. Hypernatremia improved. Patient appearing fluid overloaded today, will discontinue fluids.    - Continue strict Is/Os with sol in place   - Discontinue mIVF   - Trend sodium with daily BMP   - Monitor fluid status    Type II Diabetes   Patient with known diagnosis of type II diabetes. She did have an AGMA on admission, but felt to be more likely 2/2 lactic acidosis. Home diabetes regimen is as follows: 34 units lantus HS + 13 units aspart at breakfast + 14 units aspart at lunch + 34 aspart at dinner + metformin + bydureon + canagliflozin. Hemoglobin A1c on this admission is 8.2.    - Hold PTA metformin, bydureon, canagliflozin.    - 35 units lantus HS    - apart 1:10 carb counting with meals    - medium resistance sliding scale     RLQ pain   H/o cirrhosis 2/2 EtOH use disorder   Transaminitis, improving   Per chart review, patient has remote h/o EtOH use disorder, likely causing cirrhosis. Patient endorsed pain in RLQ  on admission. CT abdomen also did not show ascites, which is reassuring. No acute interventions at this time. INR of 1.69. AST 91, Alk phos 183, t bili 1.3 on admission. Alk phos with a sight bump today, will continue to monitor.    - CMP daily    DK - resolved   Cr 1.34 on admission, likely prerenal in setting of sepsis discussed above. Creatinine normalized.    - Daily CMP     Hypokalemia  - RN replacement protocol    Elevated trops  Trop 37 and 35 on repeat on admission. Likely demand ischemia in setting of infection discussed above. EKG on admission w/o acute ischemic changes.     Constipation  - Scheduled miralax daily   - Senna PRN    Chronic Conditions:  HTN   - Continue PTA lisinopril 5mg daily   - Hold PTA hydrochlorothiazide 50mg daily     HLD  - Hold PTA pravastatin    Bipolar disorder    - Continue PTA lamotrigine 200mg PO BID  - Continue  "PTA olanzapine 2.5mg PO daily   - Hold PTA duloxetine 30mg PO daily.   - Hold PTA mirtazapine 45mg PO daily   - Hold PTA rexulti 4mg PO daily     Chronic pain  - Tylenol 500mg QID PRN  - Continue PTA oxycodone 10mg Q4H PRN  - Hold PTA flexeril 10mg TID PRN  - Hold PTA tizanidine 2mg BID Q6H PRN    Acquired hypothyroidism  - Continue PTA synthroid     Diet: Snacks/Supplements Adult: Magic Cup; Between Meals  Regular Diet Adult    DVT Prophylaxis: Enoxaparin (Lovenox) SQ  [place] Catheter: PRESENT, indication: Retention  Lines: None     Cardiac Monitoring: None  Code Status: No CPR- Do NOT Intubate      Clinically Significant Risk Factors         # Hypernatremia: Highest Na = 147 mmol/L in last 2 days, will monitor as appropriate   # Hypercalcemia: corrected calcium is >10.1, will monitor as appropriate    # Hypoalbuminemia: Lowest albumin = 2.2 g/dL at 8/3/2023  7:47 AM, will monitor as appropriate       # Thrombocytopenia: Lowest platelets = 112 in last 2 days, will monitor for bleeding     # Hypertension: Noted on problem list       # DMII: A1C = 8.2 % (Ref range: <5.7 %) within past 6 months   # Obesity: Estimated body mass index is 34.6 kg/m  as calculated from the following:    Height as of this encounter: 1.651 m (5' 5\").    Weight as of this encounter: 94.3 kg (207 lb 14.3 oz).             The patient's care was discussed with Dr Sebas Roque MD  Hospitalist Service  [facility]  ______________________________________________________________________    Interval History   Overnight events reviewed. MRI noting concerns for epidural abscess, neurosurgery consulted. Patient endorsing ongoing pain today. States she refuses further imaging (MR head, cervical) given discomfort during imaging.     Physical Exam   Vital Signs: Temp: 99.1  F (37.3  C) Temp src: Axillary BP: 129/70 Pulse: 102   Resp: 24 SpO2: 92 % O2 Device: Nasal cannula Oxygen Delivery: 2 LPM  Weight: 207 lbs 14.3 " oz  GENERAL: Lethergic but arouses to loud voice, ill appearing. Limited ability to participate in conversation. States she is in pain.   EYES: Eyes grossly normal to inspection.    RESP:  Lung sounds clear with no obvious increased work of breathing.   CV: Systolic murmur heard over right sternal border, radiated to clavicle. No murmur heard of mitral position. Regular rate.   Abdomen: Soft, mild tenderness to palpation in epigastric region.   MSK: Bilateral upper extremities with edema, likely 2/2 maintenance fluids.   SKIN: Dorsal surface of left foot with atypical ecchymosis across toes 2-4, concerning for ossler nodes. Tender to touch. Right foot diffusely tender. Bilateral palmar surfaces of hands diffusely erythematous but non-tender.     Data     I have personally reviewed the following data over the past 24 hrs:    9.4  \   11.3 (L)   / 135 (L)     146 (H) 110 (H) 14.8 /  170 (H)   3.5 25 0.53 \     ALT: 12 AST: 30 AP: 207 (H) TBILI: 0.5   ALB: 2.2 (L) TOT PROTEIN: 6.5 LIPASE: N/A       Imaging results reviewed over the past 24 hrs:   Recent Results (from the past 24 hour(s))   MR Thoracic Spine w/o & w Contrast    Narrative    EXAM: MR THORACIC SPINE W/O and W CONTRAST, MR LUMBAR SPINE W/O and W CONTRAST  LOCATION: [facility]  DATE/TIME: 8/2/2023 8:29 PM CDT    INDICATION: Patient with staph bacteremia of unknown source. LINO negative for vegetation.  COMPARISON:  CT abdomen pelvis 07/28/2023, thoracolumbar radiographs 04/05/2023.  CONTRAST: 9.5 mL Gadavist  TECHNIQUE:   1) Routine Thoracic Spine MRI without and with IV contrast.  2) Routine Lumbar Spine MRI without and with IV contrast.    FINDINGS:  THORACIC SPINE:  Lower cervical ACDF terminating at T1. Extensive thoracolumbar fixation from T11-S1. The hardware, including loosening of the T11 pedicle screws is better assessed on recent CT. Chronic vertebral body height loss at T11. Similar widening of the T11-T12   intervertebral  space with fluid compared to recent CT, however intervertebral widening has increased compared to the 04/05/2023 thoracolumbar radiographs.    Very minimal edema along the T10 inferior endplate. Otherwise, no suspicious marrow signal in the thoracic spine. Dorsal epidural collection from C6 through the thoracic spine measuring up to 6 mm in AP dimension at T1 with some peripheral enhancement.   The thoracic thecal sac appears somewhat small. Dorsal cord flattening with mild canal stenosis in the proximal thoracic spine. No convincing abnormal cord signal in the proximal/mid thoracic spine. The cord is not well assessed at the postoperative   levels due to susceptibility artifact.    Dorsal paraspinal muscular atrophy.    LUMBAR SPINE:   There is extensive susceptibility artifact from the anterior and posterior fusion construct degrading assessment of the lumbar spine.      Impression    IMPRESSION:  THORACIC SPINE MRI:  1.  Sterility indeterminate dorsal epidural fluid collection in the lower cervical/proximal thoracic spine could reflect abscess in the setting of infection. This results in dorsal cord flattening and mild canal stenosis in the proximal thoracic spine.  2.  Chronic T11 superior endplate deformity with increased widening of the T10-T11 intervertebral space since 04/05/2023. This could be related to chronic motion/junctional arthropathy at this level and as widening could reflect instability neurosurgical   consultation is recommended. Infection at this level is felt less as there is relative paucity of adjacent marrow edema and prevertebral soft tissue swelling.  3.  The thoracic thecal sac appears diffusely small with diffuse epidural collection.    LUMBAR SPINE MRI:  1.  The lumbar spine is not well assessed due to artifact.    Dr. Sampson was contacted by me on 8/3/2023 1:09 AM CDT.   MR Lumbar Spine w/o & w Contrast    Narrative    EXAM: MR THORACIC SPINE W/O and W CONTRAST, MR LUMBAR SPINE W/O  and W CONTRAST  LOCATION: [facility]  DATE/TIME: 8/2/2023 8:29 PM CDT    INDICATION: Patient with staph bacteremia of unknown source. LINO negative for vegetation.  COMPARISON:  CT abdomen pelvis 07/28/2023, thoracolumbar radiographs 04/05/2023.  CONTRAST: 9.5 mL Gadavist  TECHNIQUE:   1) Routine Thoracic Spine MRI without and with IV contrast.  2) Routine Lumbar Spine MRI without and with IV contrast.    FINDINGS:  THORACIC SPINE:  Lower cervical ACDF terminating at T1. Extensive thoracolumbar fixation from T11-S1. The hardware, including loosening of the T11 pedicle screws is better assessed on recent CT. Chronic vertebral body height loss at T11. Similar widening of the T11-T12   intervertebral space with fluid compared to recent CT, however intervertebral widening has increased compared to the 04/05/2023 thoracolumbar radiographs.    Very minimal edema along the T10 inferior endplate. Otherwise, no suspicious marrow signal in the thoracic spine. Dorsal epidural collection from C6 through the thoracic spine measuring up to 6 mm in AP dimension at T1 with some peripheral enhancement.   The thoracic thecal sac appears somewhat small. Dorsal cord flattening with mild canal stenosis in the proximal thoracic spine. No convincing abnormal cord signal in the proximal/mid thoracic spine. The cord is not well assessed at the postoperative   levels due to susceptibility artifact.    Dorsal paraspinal muscular atrophy.    LUMBAR SPINE:   There is extensive susceptibility artifact from the anterior and posterior fusion construct degrading assessment of the lumbar spine.      Impression    IMPRESSION:  THORACIC SPINE MRI:  1.  Sterility indeterminate dorsal epidural fluid collection in the lower cervical/proximal thoracic spine could reflect abscess in the setting of infection. This results in dorsal cord flattening and mild canal stenosis in the proximal thoracic spine.  2.  Chronic T11 superior  endplate deformity with increased widening of the T10-T11 intervertebral space since 04/05/2023. This could be related to chronic motion/junctional arthropathy at this level and as widening could reflect instability neurosurgical   consultation is recommended. Infection at this level is felt less as there is relative paucity of adjacent marrow edema and prevertebral soft tissue swelling.  3.  The thoracic thecal sac appears diffusely small with diffuse epidural collection.    LUMBAR SPINE MRI:  1.  The lumbar spine is not well assessed due to artifact.    Dr. Sampson was contacted by me on 8/3/2023 1:09 AM CDT.

## 2023-08-03 NOTE — CONSULTS
NEUROSURGERY CONSULTATION NOTE    Ginna Mireles   [address] [place] [place] [address]  67 year old female  Admission Date/Time: 7/28/2023 12:25 PM  Primary Care Provider: Vandana Almazan  [facility] Attending Physician: Richar Mullen MD    Neurosurgery was asked to see this patient by Richar Mullen MD for evaluation of abnormal MRI.     PROBLEM LIST:  Principal Problem:    Community acquired pneumonia of left lower lobe of lung  Active Problems:    Left leg cellulitis    Altered mental status, unspecified altered mental status type    Sepsis, due to unspecified organism, unspecified whether acute organ dysfunction present (H)    DK (acute kidney injury) (H)    Type 2 diabetes mellitus with hyperglycemia, with long-term current use of insulin (H)    Gram-positive bacteremia       Neurosurgery Attending: The patient's clinical examination, laboratory data, and plan was discussed with Dr Bhandari.     CONSULTATION ASSESSMENT AND PLAN:  MRI finding's reviewed. Sepsis likely unrelated to small amount of dorsal epidural fluid collection in the lower cervical and proximal thoracic area. Mild stenosis a result. BC negative yesterday. No surgical intervention recommended at this time. Could consider additional imaging of C spine and L spine. Secondary issue on imaging is widening of T11-T12 disc space in the setting of loose screw at T11 from prior fusion as well as fracture at T11 (known during office visit with [facility] in April). Will fit patient with custom TLSO brace and have patient follow up with [facility].     PLAN:   Custom TLSO  Bedrest until brace in place  XR after brace in place  PT/OT after brace, XR  Follow BC - currently negative     HPI: Ginna Mireles is a 67 year old admitted to [facility] 7/28/2023 from TCU with fever, altered mental status. PMH: HTN, hyperlipidemia, CAD, COPD, chronic pain, Type II diabetes. Current differential sepsis, LLE cellulitis vs PNA, acute metabolic  encephalopathy, fall. Initial BC with gram positive cocci in clusters. ID consulted 8/1/2023. LINO negative. MSSA bacteremia. Hx s/p Posterior Hardware Removal L3-S1, Posterior Fusion T11-L3 with Instrumentation T11-Pelvis February 2023 with Dr Ramos. Known T11 fracture documented by [facility] in April 2023 with loose pedicle screw on CT 6/23/2023 at T11. MRI done yesterday shows loosening of the T11 pedicle screws, Chronic vertebral body height loss at T11. Similar widening of the T11-T12 intervertebral space with fluid compared to recent CT, however intervertebral widening has increased compared to the 04/05/2023 thoracolumbar radiographs. Per radiology this area is likely not related to infection but potentially chronic instability. For this reason, I would recommend custom TLSO brace and follow up with Dr Ramos. In addition to this finding, there is Dorsal epidural collection from C6 through the thoracic spine measuring up to 6 mm in AP dimension at T1 with some peripheral enhancement. Dorsal cord flattening with mild canal stenosis in the proximal thoracic spine. No convincing abnormal cord signal in the proximal/mid thoracic spine. The cord is not well assessed at the postoperative levels due to susceptibility artifact. Lumbar MRI with too much artifact to determine any abnormalities. Could repeat if necessary. Hardware likely impacting quality of films. BC yesterday negative. Discussed with ID. No plans for surgical intervention. On exam Ginna states her back hurts. Unclear if its worse than usual. She tells me her legs hurt and she is unable to lift them off the bed. She has normal PF, DF. When I attempt to lift her legs she calls out in pain. Left toes have some ecchymosis. Legs seem slightly swollen. She states she wore a back brace in the past. Her care facility cannot locate one. Will order new custom TLSO. Ginna denies any family for me to update.       History reviewed. No  "pertinent past medical history.  Past Surgical History:   Procedure Laterality Date    CHOLECYSTECTOMY      HYSTERECTOMY      NECK SURGERY      TONSILLECTOMY         REVIEW OF SYSTEMS:   Negative with exception of HPI     MEDICATIONS:    No current outpatient medications on file.         ALLERGIES/SENSITIVITIES:     Allergies   Allergen Reactions    Hydroxyzine     Mushroom     Penicillins      Hives      Tamiflu [Oseltamivir]        PERTINENT SOCIAL HISTORY: personally reviewed   Social History     Socioeconomic History    Marital status:      Spouse name: None    Number of children: None    Years of education: None    Highest education level: None   Tobacco Use    Smoking status: Never    Smokeless tobacco: Never         FAMILY HISTORY:  Family History   Problem Relation Age of Onset    Cataracts Mother     Lung Cancer Father     HIV/AIDS Brother     Diabetes Sister         PHYSICAL EXAM:   Constitutional: /70 (BP Location: Left arm)   Pulse 102   Temp 99.1  F (37.3  C) (Axillary)   Resp 24   Ht 1.651 m (5' 5\")   Wt 94.3 kg (207 lb 14.3 oz)   SpO2 92%   BMI 34.60 kg/m       Mental Status: alert. Does not answer orientation questions. Affect is flat.  Speech is somewhat difficult to understand. Resolving encephalopathy.      Motor: diminished bulk and tone all muscle groups of upper and lower extremities.    Strength:   Limited exam, effort and pain     Sensory: Sensation intact bilaterally to light touch.    IMAGING:  I personally reviewed all radiographic images     DARRICK Larsen [facility] Neurosurgery        Cc:   No referring provider defined for this encounter.    Vandana Almazan  [unfilled]                    "

## 2023-08-03 NOTE — PLAN OF CARE
Problem: Plan of Care - These are the overarching goals to be used throughout the patient stay.    Goal: Absence of Hospital-Acquired Illness or Injury  Intervention: Prevent Skin Injury  Recent Flowsheet Documentation  Taken 8/3/2023 0100 by Heather Roblero RN  Body Position: position changed independently   Goal Outcome Evaluation:       Pt is very lethargic, and very drowsy all night long, only opens eyes to touch and goes back to sleep, has been groaning with signs of mild distress but looked calm and restful this morning, MRI was successful and results are in, D5 running at 100 ml/hr, on 2 L NC, iv antibiotics running.

## 2023-08-03 NOTE — PLAN OF CARE
Problem: Plan of Care - These are the overarching goals to be used throughout the patient stay.    Goal: Optimal Comfort and Wellbeing  Outcome: Not Progressing   Goal Outcome Evaluation:         Noted pt increased restlessness & VS, pt continues to be lethargic. PRN APAP admin. Prior. Updated MD for lower dosage of PRN narcotics. Cont. To T&R        Problem: Oral Intake Inadequate  Goal: Improved Oral Intake  Outcome: Progressing   Pt initially NPO incase of needing more imagine for spine area. Pt eventually able to be on Reg diet. Tolerated meds crushed in pudding & soft diet for lunch.     Writer updated Dr. Roque @ 0800 on pts status. MD eventually to bedside for assessment. Neurosurgery consulted. Writer again updated MD on change in VS, increased restlessness as writer noted no appropriate pain meds (due to lethargy) and no BM documented. MD to order range for pain meds as well as increase BM meds for pt. Bedside report handed off to PM RN.

## 2023-08-03 NOTE — PROGRESS NOTES
"CLINICAL NUTRITION SERVICES - ASSESSMENT NOTE     Nutrition Prescription    RECOMMENDATIONS FOR MDs/PROVIDERS TO ORDER:  If intake doesn't improve in the next 48 hours recommend initiating enteral nutrition support  Consider increasing bowel meds. No BM this stay yet    Malnutrition Status:    Moderate in acute illness    Recommendations already ordered by Registered Dietitian (RD):  No new    Future/Additional Recommendations:  Monitor intake, weight, labs, supplement tolerance     EVALUATION OF PROGRESS TOWARDS GOALS    CURRENT NUTRITION ORDERS  Diet: Moderate Consistent Carbohydrate  Supplement: magic cup daily   Intake/Tolerance: Poor. 0-50%    Yesterday did not eat anything   8/1 Ate 50% of only 1 meal for the day at 200 kcal, 15 g protein  7/31 Ate 100% of breakfast at 622 kcal, 28 g protein, lunch and supper intake not documented  7/30 Ate 25% and 50% of 2 of 3 ordered meals    Receiving D5 IVF at 100 ml/hr since 7/31 = 2400 ml, 120 g cho, 408 kcal/day    Pt difficult to understand, mumbling. She likes ice cream.     LABS  Na 146 (H), no change from yesterday  Alk phos 207 (H), rising  -286 mg/dl past  24 hours, in poor control. Likely exacerbated by infection    MEDICATIONS  Iv abx, ssi, lantus daily, novolog 1u: 10 g cho, levothyroxine, mvi with minerals, protonix, miralax daily    ANTHROPOMETRICS  Height: 165.1 cm (5' 5\")  Most Recent Weight:94.3 kg (207 lb 14.3 oz) 7/31  IBW: 56.8 kg  BMI: Obesity Grade II BMI 35-39.9  Weight History:     Weight Method     07/31/23 0452 94.3 kg (207 lb 14.3 oz) Bed scale   07/30/23 0616 93 kg (205 lb 0.4 oz) Bed scale   07/29/23 0421 96.1 kg (211 lb 13.8 oz) Bed scale     From [facility] Everywhere:  90.7 kg (200 lb) 04/05/2023     90.3 kg (199 lb) 01/20/2023 1:52 PM      Dosing Weight: 66.6 kg (adj wt)    ASSESSED NUTRITION NEEDS  Reassessed protein needs today d/t increased needs  Estimated Energy Needs: 1340-8462 kcals/day (25 - 30 kcals/kg)  Justification: " Obese  Estimated Protein Needs: 79-99 grams protein/day (1.2-1.5 grams of pro/kg)  Justification: repletion, hypercatabolism with acute illness  Estimated Fluid Needs: 4505-0169 mL/day (1 mL/kcal)   Justification: Maintenance    GI  No BM this stay.     MALNUTRITION:  % Weight Loss:  None noted  % Intake:  <50%>/= 5 days  Subcutaneous Fat Loss:  None observed  Muscle Loss:  Temporal region mild depletion  Fluid Retention:  None noted    Malnutrition Diagnosis: Moderate malnutrition in the context of acute illness     NUTRITION DIAGNOSIS  Inadequate oral intake related to poor appetite, acute illness as evidenced by po intake, < 50% of meals.      INTERVENTIONS  Implementation  -If intake doesn't improve in the next 48 hours recommend initiating enteral nutrition support  -Consider providing prn bowel meds with no BM X 6 days    Goals  Nutrition intake > 75% of estimated nutrition needs- not progressing  Glucose < 180- not progressing     Monitoring/Evaluation  Intake, weight, labs, stooling, need for nutrition support, BG

## 2023-08-03 NOTE — PROGRESS NOTES
S: Pt. seen at [facility]. Female. Savanna [place]. RX: TLSO. DX: T11-12 FX..  O:A: Pt. is very hard to move. Today I F/D an [employer] 456 TLSO to stabilize Fx. site. Fit was ok with an extension added to the waist. Pt. happy with support. Donning doffing wear and care instructions given verbally and hard copy to Pt..  P: Pt. to be seen as needed.    Randolph CHILDS,LO

## 2023-08-03 NOTE — PROGRESS NOTES
Care Management Follow Up    Length of Stay (days): 6    Expected Discharge Date: 08/04/2023     Concerns to be Addressed:  TSLO, PICC placement       Patient plan of care discussed at interdisciplinary rounds: Yes    Anticipated Discharge Disposition: LTC     Anticipated Discharge Services:  LTC  Anticipated Discharge DME:  TSLO         Additional Information:  Patient w/PMHx of T2DM on insulin, liver cirrhosis, HTN, HLD, CAD, COPD, remote EtOH use disorder, bipolar disorder, and chronic pain admitted on 7/28/2023 for lethargy and fevers, found to be septic and started on vancomycin and levofloxacin. Unclear source of infection at this time.     ID following with recommendations for PICC line, antibiotic IV Ancef until September 20  Neurosurgery following for imaging is widening of T11-T12 disc space in the setting of loose screw at T11 from prior fusion as well as fracture at T11 with recommendation for custom TSLO. Patient will need to follow  up with [facility].       Social History:  Patient comes from [facility] and noted to be bed bound at baseline received assistance with all ADL's and IADL's. She anticipates return when medically stable and will need [facility]-stretcher.        8/3/23:  Voice message left for [facility] admission regarding potential return tomorrow.          Suzi Ricardo RN

## 2023-08-03 NOTE — PROGRESS NOTES
"[facility] Progress Note    SUBJECTIVE:  MRI with a 6mm wide collection from c6 to about T1.  I would not think this is operative disease.  I talked to Savanna from neurosurgery about that.     REVIEW OF SYSTEMS:  Negative unless as listed above.  Social history, Family history, Medications: reviewed.    OBJECTIVE:  BP (!) 150/67 (BP Location: Right arm, Patient Position: Supine)   Pulse 105   Temp 99.2  F (37.3  C) (Axillary)   Resp 26   Ht 1.651 m (5' 5\")   Wt 94.3 kg (207 lb 14.3 oz)   SpO2 94%   BMI 34.60 kg/m                PHYSICAL EXAM:  Alert, awake  Vitals tabulated above, reviewed  Neck supple without lymphadenopathy  Sclera normal color, not injected  CARDIOVASCULAR regular rate and rhythm, 2/6 sys murmur  Lungs CLEAR TO AUSCULTATION   Abdomen soft, NT/ND, absent HEPATOSPLENOMEGALY  Skin normal with findings in the consult noted again  Joints normal  Neurologic exam non focal    Antibiotics:ancef    Pertinent labs:  Lab Results   Component Value Date    WBC 9.1 08/02/2023     Lab Results   Component Value Date    RBC 4.22 08/02/2023     Lab Results   Component Value Date    HGB 12.3 08/02/2023     Lab Results   Component Value Date    HCT 38.5 08/02/2023     No components found for: MCT  Lab Results   Component Value Date    MCV 91 08/02/2023     Lab Results   Component Value Date    MCH 29.1 08/02/2023     Lab Results   Component Value Date    MCHC 31.9 08/02/2023     Lab Results   Component Value Date    RDW 17.0 08/02/2023     Lab Results   Component Value Date     08/02/2023       Last Comprehensive Metabolic Panel:  Sodium   Date Value Ref Range Status   08/03/2023 146 (H) 136 - 145 mmol/L Final     Potassium   Date Value Ref Range Status   08/03/2023 3.5 3.4 - 5.3 mmol/L Final     Chloride   Date Value Ref Range Status   08/03/2023 110 (H) 98 - 107 mmol/L Final     Carbon Dioxide (CO2)   Date Value Ref Range Status   08/03/2023 25 22 - 29 mmol/L Final     Anion Gap "   Date Value Ref Range Status   08/03/2023 11 7 - 15 mmol/L Final     Glucose   Date Value Ref Range Status   08/03/2023 238 (H) 70 - 99 mg/dL Final     GLUCOSE BY METER POCT   Date Value Ref Range Status   08/03/2023 219 (H) 70 - 99 mg/dL Final     Urea Nitrogen   Date Value Ref Range Status   08/03/2023 14.8 8.0 - 23.0 mg/dL Final     Creatinine   Date Value Ref Range Status   08/03/2023 0.53 0.51 - 0.95 mg/dL Final     GFR Estimate   Date Value Ref Range Status   08/03/2023 >90 >60 mL/min/1.73m2 Final     Calcium   Date Value Ref Range Status   08/03/2023 8.6 (L) 8.8 - 10.2 mg/dL Final       Liver Function Studies -   Recent Labs   Lab Test 08/02/23  0541   PROTTOTAL 6.8   ALBUMIN 2.3*   BILITOTAL 0.6   ALKPHOS 176*   AST 41   ALT 17       Erythrocyte Sedimentation Rate   Date Value Ref Range Status   07/03/2019 47 (H) 0 - 20 mm/hr Final       CRP   Date Value Ref Range Status   07/03/2019 2.4 (H) 0.0 - 0.8 mg/dL Final               MICROBIOLOGY DATA:    Aug 1 micro to date neg, prior with 6 consecutive + BC    IMAGING/RADIOLOGY:    IMPRESSION:  THORACIC SPINE MRI:  1.  Sterility indeterminate dorsal epidural fluid collection in the lower cervical/proximal thoracic spine could reflect abscess in the setting of infection. This results in dorsal cord flattening and mild canal stenosis in the proximal thoracic spine.  2.  Chronic T11 superior endplate deformity with increased widening of the T10-T11 intervertebral space since 04/05/2023. This could be related to chronic motion/junctional arthropathy at this level and as widening could reflect instability neurosurgical   consultation is recommended. Infection at this level is felt less as there is relative paucity of adjacent marrow edema and prevertebral soft tissue swelling.  3.  The thoracic thecal sac appears diffusely small with diffuse epidural collection.     LUMBAR SPINE MRI:  1.  The lumbar spine is not well assessed due to artifact.      ASSESSMENT:  MSSA  bacteremia hi grade  Back pain  Clean LINO  MRI above    RECOMMENDATION:  Blood clearing  PICC tomorrow  Abx to about sept 20 or so  D/w Savanna CUTLER  D/w resident on case  Have today be a day of equipoise, line and maybe back to TCU tomorrow if we can.     HI Enriquez MD  Office [phone] option 2 to desk staff

## 2023-08-04 NOTE — PROGRESS NOTES
Neurosurgery Progress Note:       A/P: Ms. Mireles is a 67 year old female who presented on 7/28 with sepsis likely unrelated to small amount of dorsal epidural fluid collection in the lower cervical and proximal thoracic area. Mild stenosis a result. BC negative yesterday. ID following     No surgical intervention recommended at this time. Could consider additional imaging of C spine and L spine patient declined yesterday .      Secondary issue on imaging is widening of T11-T12 disc space in the setting of loose screw at T11 from prior fusion as well as fracture at T11 (known during office visit with [facility] in April).     Currently very lethargic and confused does state that back pain has improved continued lower extremity pain of which unable to answer any further questions. She will wiggle toes on exam but not other strength testing completed      Discussed with Quique LANDRY with [facility] plans to fit patient with custom TLSO and follow up outpatient- patient has appointment on August 17th with Dr. Zavala - if needed could transfer to [place] for further surgical needs but would need to be cleared by ID prior to pursuing any surgical intervention      Plan:  1. Strict bedrest  2. Orthtoics to fit patient with custom TLSO as previously ordered  3. Brace to be worn at all times okay to remove for skin cares   4. Will obtain new xray once brace arrives   Will continue to follow      Neurosurgery Attending: The patient's clinical examination, laboratory data, and plan was discussed with Dr. Bhandari     HPI: MRI finding's reviewed. Sepsis likely unrelated to small amount of dorsal epidural fluid collection in the lower cervical and proximal thoracic area. Mild stenosis a result. BC negative yesterday. No surgical intervention recommended at this time. Could consider additional imaging of C spine and L spine. Secondary issue on imaging is widening of T11-T12 disc space in the setting of loose screw at T11 from prior  "fusion as well as fracture at T11 (known during office visit with [facility] in April).      S:   Feels back pain improved slightly continued lower extremity pain      O:  PHYSICAL EXAM:   /56 (BP Location: Left arm, Patient Position: Semi-Franks's)   Pulse 98   Temp 100.1  F (37.8  C) (Axillary)   Resp 24   Ht 1.651 m (5' 5\")   Wt 107.3 kg (236 lb 8.9 oz)   SpO2 91%   BMI 39.36 kg/m         Mental Status: lethargic. Does not answer orientation questions. Affect is flat.  Speech is somewhat difficult to understand. continued encephalopathy.      Motor: diminished bulk and tone all muscle groups of upper and lower extremities.     Strength: Limited exam, effort and pain, does slightly wiggle toes bilaterally      Sensory: Sensation intact bilaterally to light touch.     Images reviewed personally   IMPRESSION: Status post posterior spinal fusion with metallic hardware extending from T11 to S1. The hardware visualized on this study is intact. Again visualized is the loss of height along the superior endplate of the T11 vertebral body. This has   progressed in the interval. The height of the anterior portion of T11 is currently 6.3 mm as compared to 13 mm previously. This does lead to a mild kyphosis at the T10-T11 disc space level which has decreased in height in the interval and the left T11   pedicle screw is abutting the inferior endplate of the T10 vertebral body. Rest of the vertebral body heights visualized on this study are maintained.     IMPRESSION:head CT   1.  Motion artifact limits assessment.   2.  No acute intracranial process identified.      Elly Ordaz PA-C  [facility]  O: [phone]   "

## 2023-08-04 NOTE — PLAN OF CARE
"  Problem: Oral Intake Inadequate  Goal: Improved Oral Intake  Outcome: Progressing   Goal Outcome Evaluation:  Pt tolerated meds crushed in applesauce. Ate bites for breakfast & 50% lunch (Approx. 75% mashed potatoes, 75% pudding; bites mac & cheese).      Problem: Gas Exchange Impaired  Goal: Optimal Gas Exchange  Intervention: Optimize Oxygenation and Ventilation  Recent Flowsheet Documentation  Taken 8/4/2023 1200 by Deandra Mccord RN  Head of Bed (HOB) Positioning: HOB at 20-30 degrees  Taken 8/4/2023 0833 by Deandra Mccord RN  Head of Bed (HOB) Positioning: HOB at 30 degrees        Pt was on 4L 02 via N/C as writer coming on shift. Increased to 5L 02 via N/C. Current 02 sats >90%.     Writer updated Dr. Roque on pts status at 0850 as pts VS changed. See flowsheet. Writer spoke with neurosurgery at approx. 0900, pt to remain strict bedrest until orthotics return this afternoon for custom TLSO. Pt currently in quick draw like TLSO. Cont to T&R. Pt sister called at approx. 1115, writer gave update. MD to call & update sister when able. PICC placed. PO lasix ordered & admin. Pt had adequate output. MD attempted to call pts sister for update, however phone number listed out of service. Left sticky note for staff to obtain # if pts sister calls, pts sister due to visit Sunday. Pt has sacral mepliex in place, skin remains c/d/I. Applied another mepilex as noted on L buttocks excoriation. Writer texted MD for WOC order. Cont. To T&R. Writer gave report to PM RN, bedside report given. Noted pt to be more alert, speaking in sentences & rating pain a \"9\", PRN oxycodone 5mg admin. PM RN to follow up.                  "

## 2023-08-04 NOTE — PLAN OF CARE
"  Problem: Plan of Care - These are the overarching goals to be used throughout the patient stay.    Description: The Care Plan Review/Shift Note, Individualized Goals, Hospital-Acquired Illness or Injury, Comfort and Wellbeing, and Transition Planning are the \"Overarching Goals\" and should be updated throughout the hospitalization.  Please hover over the (i) for specific information on each goal topic.  Goal: Plan of Care Review  Description: The Plan of Care Review/Shift note should be completed every shift.  The Outcome Evaluation is a brief statement about your assessment that the patient is improving, declining, or no change.  This information will be displayed automatically on your shift note.  Outcome: Progressing  Flowsheets (Taken 8/4/2023 1047)  Outcome Evaluation: Intake improved yesterday, no breakfast, ate 100% of luch and 50% of supper with estimated intake 899 kcal, 36 g protien, meeting 45-55% of estimated nutrition needs. Supplement intake not documented. Only took bites and sips at breakfast this am. Wt 236 lb 8/3, 30 lb up from 4 days prior in fluid. Na 147 (H), increased today. ALk phos 266 (H), increased today. Will add Glucerna daily and monitor acceptance  Overall Patient Progress: improving     Problem: Oral Intake Inadequate  Goal: Improved Oral Intake  Outcome: Progressing   Goal Outcome Evaluation:           Overall Patient Progress: improvingOverall Patient Progress: improving    Outcome Evaluation: Intake improved yesterday, no breakfast, ate 100% of luch and 50% of supper with estimated intake 899 kcal, 36 g protien, meeting 45-55% of estimated nutrition needs. Supplement intake not documented. Only took bites and sips at breakfast this am. Wt 236 lb 8/3, 30 lb up from 4 days prior in fluid. Na 147 (H), increased today. ALk phos 266 (H), increased today. Will add Glucerna daily and monitor acceptance      "

## 2023-08-04 NOTE — PLAN OF CARE
Problem: Plan of Care - These are the overarching goals to be used throughout the patient stay.    Goal: Optimal Comfort and Wellbeing  Outcome: Not Progressing  Intervention: Monitor Pain and Promote Comfort  Recent Flowsheet Documentation  Taken 8/3/2023 1643 by Maylin Delgadillo RN  Pain Management Interventions:   medication (see MAR)   repositioned  Goal: Readiness for Transition of Care  Outcome: Not Progressing   Goal Outcome Evaluation:             Pt is alert, disoriented to situation and time with fluctuating orientation throughout this shift. Pt has pain in back, scored 5 on PAINAD assessment, managed by scheduled and prn meds. Last dose of Oxycodone administered at 2130.     Pt is tachycardic, respirations are fast and shallow. Pts urine was yellow at the beginning of shift, became red. Bedtime Bg was 336. Notified provider on fluctuating mental status, red urine, shallow, rapid breathing, and high BG. Provider ordered stat head CT, and sliding scale insulin. Was unable to administered Insulin as pt went down to CT. Notified oncoming RN to recheck BG and administer insulin prn.     Pt ate half of her meal, needs assistance with eating. Pt is on bedrest, lateral transfer via amaya rhys . 2L of oxygen via nasal cannula. Pt does not use call light, rounded frequently.     Maylin Delgadillo RN.

## 2023-08-04 NOTE — PROCEDURES
"PICC Line Insertion Procedure Note    Pt. Name:   Ginna Mireles  MRN:          [id number]    Procedure:     Insertion of a  SINGLE Lumen  4 fr  Bard SOLO (valved) Power PICC, Lot number [id number]    Indications: ANtibiotic    Contraindications : None    Procedure Details:    Patient identified with 2 identifiers and \"Time Out\" conducted.    Central line insertion bundle followed: Hand hygiene performed prior to procedure, site cleansed with Cholraprep (CHG), hat, mask, sterile gloves, sterile gown worn, patient draped with maximum barrier head to toe drape, sterile field maintained.    The right upper arm BASILIC vein was assessed by ultrasound and found to be anechoic, compressible, patent, and of adequate size.     Lidocaine 1% 2.5 ml administered SQ to the insertion site.     Modified Seldinger Technique (MST) used for insertion, ONE attempt(s) required to access vein. Imaging during access shows the needle within the vein.     PICC was advanced through peel-away sheath.    Catheter threaded without difficulty. The tip of the catheter was positioned in the SVC. Good blood return noted.    Catheter was flushed with 20 cc normal saline.     Catheter secured with Statlock, tissue adhesive (on insertion site only), Biopatch (CHG), and Tegaderm dressing applied.    The sharps that are included in the PICC insertion kit were accounted for and disposed of in the sharps container prior to breakdown of the sterile field.    CLABSI prevention brochure left at bedside.    Patient  tolerated procedure well.     Patient's primary RN notified PICC is ready for use.      Findings:    Total catheter length  38 cm, with 0 cm exposed. Mid upper arm circumference is 41 cm.       Tip placement verified in the distal SVC by [employer]:        Comments:  None        David Senior, MSN, RN, VA-BC   Vascular Access - [facility]      "

## 2023-08-04 NOTE — PROGRESS NOTES
"[facility] Infectious Disease Progress Note    SUBJECTIVE:  AMS overnight, CTH no new findings.  Temp 100.   REVIEW OF SYSTEMS:  Negative unless as listed above.  Social history, Family history, Medications: reviewed.    OBJECTIVE:  /56 (BP Location: Left arm, Patient Position: Semi-Franks's)   Pulse 108   Temp 100.1  F (37.8  C) (Axillary)   Resp 24   Ht 1.651 m (5' 5\")   Wt 107.3 kg (236 lb 8.9 oz)   SpO2 93%   BMI 39.36 kg/m                PHYSICAL EXAM:  Alert, awake  Vitals tabulated above, reviewed  Neck supple without lymphadenopathy  Sclera normal color, not injected  CARDIOVASCULAR regular rate and rhythm, 2/6 sys murmur  Lungs CLEAR TO AUSCULTATION   Abdomen soft, NT/ND, absent HEPATOSPLENOMEGALY  Skin normal with findings in the consult noted again  Joints normal  Neurologic exam non focal    Antibiotics:ancef    Pertinent labs:  Lab Results   Component Value Date    WBC 9.1 08/02/2023     Lab Results   Component Value Date    RBC 4.22 08/02/2023     Lab Results   Component Value Date    HGB 12.3 08/02/2023     Lab Results   Component Value Date    HCT 38.5 08/02/2023     No components found for: MCT  Lab Results   Component Value Date    MCV 91 08/02/2023     Lab Results   Component Value Date    MCH 29.1 08/02/2023     Lab Results   Component Value Date    MCHC 31.9 08/02/2023     Lab Results   Component Value Date    RDW 17.0 08/02/2023     Lab Results   Component Value Date     08/02/2023       Last Comprehensive Metabolic Panel:  Sodium   Date Value Ref Range Status   08/04/2023 147 (H) 136 - 145 mmol/L Final     Potassium   Date Value Ref Range Status   08/04/2023 3.5 3.4 - 5.3 mmol/L Final     Chloride   Date Value Ref Range Status   08/04/2023 113 (H) 98 - 107 mmol/L Final     Carbon Dioxide (CO2)   Date Value Ref Range Status   08/04/2023 25 22 - 29 mmol/L Final     Anion Gap   Date Value Ref Range Status   08/04/2023 9 7 - 15 mmol/L Final     Glucose   Date Value Ref Range " Status   08/04/2023 259 (H) 70 - 99 mg/dL Final     GLUCOSE BY METER POCT   Date Value Ref Range Status   08/04/2023 225 (H) 70 - 99 mg/dL Final     Urea Nitrogen   Date Value Ref Range Status   08/04/2023 18.8 8.0 - 23.0 mg/dL Final     Creatinine   Date Value Ref Range Status   08/04/2023 0.74 0.51 - 0.95 mg/dL Final     GFR Estimate   Date Value Ref Range Status   08/04/2023 88 >60 mL/min/1.73m2 Final     Calcium   Date Value Ref Range Status   08/04/2023 9.1 8.8 - 10.2 mg/dL Final       Liver Function Studies -   Recent Labs   Lab Test 08/02/23  0541   PROTTOTAL 6.8   ALBUMIN 2.3*   BILITOTAL 0.6   ALKPHOS 176*   AST 41   ALT 17       Erythrocyte Sedimentation Rate   Date Value Ref Range Status   07/03/2019 47 (H) 0 - 20 mm/hr Final       CRP   Date Value Ref Range Status   07/03/2019 2.4 (H) 0.0 - 0.8 mg/dL Final               MICROBIOLOGY DATA:    Aug 1 micro to date neg, prior with 6 consecutive + BC    IMAGING/RADIOLOGY:    IMPRESSION:  THORACIC SPINE MRI:  1.  Sterility indeterminate dorsal epidural fluid collection in the lower cervical/proximal thoracic spine could reflect abscess in the setting of infection. This results in dorsal cord flattening and mild canal stenosis in the proximal thoracic spine.  2.  Chronic T11 superior endplate deformity with increased widening of the T10-T11 intervertebral space since 04/05/2023. This could be related to chronic motion/junctional arthropathy at this level and as widening could reflect instability neurosurgical   consultation is recommended. Infection at this level is felt less as there is relative paucity of adjacent marrow edema and prevertebral soft tissue swelling.  3.  The thoracic thecal sac appears diffusely small with diffuse epidural collection.     LUMBAR SPINE MRI:  1.  The lumbar spine is not well assessed due to artifact.      ASSESSMENT:  MSSA bacteremia hi grade  Back pain  Clean LINO  MRI above    RECOMMENDATION:  Blood clearing  PICC today  Abx to  about sept 20 or so  I'll get the orders in today, see me in a month    HI Enriquez MD  Office [phone] option 2 to desk staff

## 2023-08-04 NOTE — SIGNIFICANT EVENT
Significant Event Note    Time of event: 10:09 PM August 3, 2023    Description of event:  Called to pt bedside for AMS. Pt is here w/ MSSA bacteremia and suspected small epidural abscess. Currently on Cefazolin for this, receiving daily BCs. LINO 8/2 w/o vegetations, MRI of the T spine showing small epidural abscess, pt refusing MRI of the head and C spine. Per RN report pt has had fluctuating mental status throughout the day, however now this evening is less responsive and seemingly more confused.    On my arrival pt is afebrile, mildly tachycardic, breathing shallow and rapidly at RR of 26. BG >300. She does endorse abdominal pain, though this is not new and pain management has been a challenge during her stay thus far. Pt is giving only short one word answers, not answering able to tell me her name, location, or date. Unclear if this is secondary to anxiety/pain/etc. Unable to cooperate w/ cranial nerve exam as well, though pupils equal and reactive. Symmetric UE and LE strength though somewhat diminished.     Given change in AMS and inability to participate in full neuro exam will order stat head CT w/o contrast d/t concern for acute CVA.     RN also reporting pt w/ new red tinged urine this evening. Has had a sol in producing clear to light yellow urine thus far. Has not been complaining of urinary symptoms. Currently on Cefazolin per above.    Plan:  - Head CT w/o contrast stat    Discussed with: bedside nurse    Idris Whitaker MD

## 2023-08-04 NOTE — PROGRESS NOTES
Care Management Follow Up    Length of Stay (days): 7    Expected Discharge Date: 08/06/2023     Concerns to be Addressed:  TSLO       Patient plan of care discussed at interdisciplinary rounds: Yes     Anticipated Discharge Disposition: LTC     Anticipated Discharge Services:  LTC  Anticipated Discharge DME:  TSLO           Additional Information:  Patient w/PMHx of T2DM on insulin, liver cirrhosis, HTN, HLD, CAD, COPD, remote EtOH use disorder, bipolar disorder, and chronic pain admitted on 7/28/2023 for lethargy and fevers, found to be septic and started on vancomycin and levofloxacin. Unclear source of infection at this time.     ID following, PICC placed,  antibiotic IV Ancef until September 20. Follow up with ID in a month.  Neurosurgery following for imaging is widening of T11-T12 disc space in the setting of loose screw at T11 from prior fusion as well as fracture at T11 with recommendation for custom TSLO. Patient will need to follow  up with [facility].         Social History:  Patient comes from [facility] and noted to be bed bound at baseline received assistance with all ADL's and IADL's. She anticipates return when medically stable and will need [facility]-stretcher.           8/4/23:  Updated admissions on anticipated discharge.     Suzi Ricardo RN

## 2023-08-04 NOTE — PROGRESS NOTES
I, Cyndie Roque MD, was present with the medical student who participated in the service and in the documentation of the note.  I have verified the history and personally performed the physical exam and medical decision making.  I agree with the assessment and plan of care as documented in the note.        [facility]    Medicine Progress Note - Hospitalist Service    Date of Admission:  7/28/2023    Assessment & Plan   Ginna Mireles is a 67 year old female w/PMHx of T2DM on insulin, liver cirrhosis, HTN, HLD, CAD, COPD, remote EtOH use disorder, bipolar disorder, and chronic pain admitted on 7/28/2023 for lethargy and fevers, found to be septic with MSSA bacteremia and unknown source. LINO completed on 8/2/23 and did not show vegetations. MRI lumbar and thoracic with concern for epidural abscess. ID and Neurosurgery do not feel this requires surgical intervention. Will require prolonged course of outpatient IV antibiotics. Neurosurgery assisting in brace fitting for concern for hardware malfunction.     MSSA bacteremia  LINO without vegetations   MRI with concern for small epidural abscess  Acute metabolic encephalopathy   Patient presented with fevers of 103 degrees and AMS. She is usually communicative and uses a walker at baseline; found down in facility. Positive blood culture x 4 days concerning for high grade MSSA bacteremia. TTE performed 7/29 did not show signs of vegetations. MRI lumbar and thoracic did reveal area of fluid collection concerning for possible epidural abscess, but ID and neurosurgery agree that this is not an operative infection. Will continue IV antibiotics, expect prolonged course. PICC placed.    - ID consulted    - LINO completed 8/2/23 and negative for vegetations    - MRI lumbar and thoracic with concerns for small epidural abscess, non-operative    - MRI cervical and head not obtained, patient refusing 2/2 pain     - Discontinue daily blood cultures   - Continue  cefazolin 2g Q8H  - PICC placement today   - cleared to discharge from ID perspective   - Neurosurgery consulted    - Agree with non-operative infection as noted above   - patient will be fitted for new back brace today for additional support for moved hardware in her back.    - awaiting final recommendations     Urinary retention, resolved   Concern for post-obstructive dieresis possibly contributing to hypernatremia   Hypernatremia, improving   Patient retaining on admission, 2L out following straight cath. Initial high urine output concerning for post operative diuresis. mIVF D5 given to counteract diuresis. Hypernatremia improved. Patient continues to appear fluid overloaded today. Worsening oxygen needs. Will diuresis today.    - Continue strict Is/Os with sol in place   - Trend sodium with daily BMP   - furosemide 40 mg PO x1 today     - Could consider repeat this evening if improvement to symptoms and good urine output    - Monitor fluid status     Increasing Oxygen Needs   Likely 2/2 Fluid Status   Patient with increased oxygen needs overnight. Most likely secondary to fluid status, will diurese. Previous concern for pneumonia on admission but should be covered by current antibiotic regimen.    - Diureses as above   - Continue to monitor fluid status     Type II Diabetes   Patient with known diagnosis of type II diabetes. She did have an AGMA on admission, but felt to be more likely 2/2 lactic acidosis. Home diabetes regimen is as follows: 34 units lantus HS + 13 units aspart at breakfast + 14 units aspart at lunch + 34 aspart at dinner + metformin + bydureon + canagliflozin. Hemoglobin A1c on this admission is 8.2.    - Hold PTA metformin, bydureon, canagliflozin.    - 35 units lantus HS    - Increased to very high resistance sliding scale regimen with meals     RLQ pain   H/o cirrhosis 2/2 EtOH use disorder   Transaminitis, improving   Per chart review, patient has remote h/o EtOH use disorder, likely  causing cirrhosis. Patient endorsed pain in RLQ  on admission. CT abdomen also did not show ascites, which is reassuring. Transaminases with slight bump today with not change to abdominal exam or pain beyond baseline.   - CMP daily    DK - resolved   Cr 1.34 on admission, likely prerenal in setting of sepsis discussed above. Creatinine normalized. Will continue to watch in the setting of now diuresing patient.    - Daily CMP     Hypokalemia  - RN replacement protocol    Elevated trops  Trop 37 and 35 on repeat on admission. Likely demand ischemia in setting of infection discussed above. EKG on admission w/o acute ischemic changes.     Constipation  - Scheduled miralax daily   - Senna PRN    Chronic Conditions:  HTN   - Continue PTA lisinopril 5mg daily   - Hold PTA hydrochlorothiazide 50mg daily     HLD  - Hold PTA pravastatin    Bipolar disorder    - Continue PTA lamotrigine 200mg PO BID  - Continue PTA olanzapine 2.5mg PO daily   - Hold PTA duloxetine 30mg PO daily.   - Hold PTA mirtazapine 45mg PO daily   - Hold PTA rexulti 4mg PO daily     Chronic pain  - Tylenol 500mg QID PRN  - Continue PTA oxycodone 10mg Q4H PRN  - Hold PTA flexeril 10mg TID PRN  - Hold PTA tizanidine 2mg BID Q6H PRN    Acquired hypothyroidism  - Continue PTA synthroid     Diet: Snacks/Supplements Adult: Magic Cup; Between Meals  Regular Diet Adult    DVT Prophylaxis: Enoxaparin (Lovenox) SQ  [place] Catheter: PRESENT, indication: Retention  Lines: None     Cardiac Monitoring: None  Code Status: No CPR- Do NOT Intubate      Clinically Significant Risk Factors         # Hypernatremia: Highest Na = 147 mmol/L in last 2 days, will monitor as appropriate   # Hypercalcemia: corrected calcium is >10.1, will monitor as appropriate    # Hypoalbuminemia: Lowest albumin = 2.1 g/dL at 8/4/2023  5:34 AM, will monitor as appropriate           # Hypertension: Noted on problem list       # DMII: A1C = 8.2 % (Ref range: <5.7 %) within past 6 months   #  "Obesity: Estimated body mass index is 39.36 kg/m  as calculated from the following:    Height as of this encounter: 1.651 m (5' 5\").    Weight as of this encounter: 107.3 kg (236 lb 8.9 oz).             The patient's care was discussed with Dr Sebas Jean, MS4  [employer]      Cyndie Roque MD PGY-2  [facility]    ______________________________________________________________________    Interval History   Overnight events reviewed. Head CT negative. Patient appears stable this morning. Answers yes and no questions with direction. Had reported pain to nursing. Will be getting alternative new back brace later today.     Physical Exam   Vital Signs: Temp: 100.1  F (37.8  C) Temp src: Axillary BP: 115/56 Pulse: 108   Resp: 24 SpO2: 93 % O2 Device: Nasal cannula Oxygen Delivery: 5 LPM  Weight: 236 lbs 8.86 oz  GENERAL: Lethergic but arouses to loud voice. Answers in one word questions.   EYES: Eyes grossly normal to inspection.    RESP:  Difficult pulmonary exam 2/2 body habitus and inability to reposition. Shallow breaths with occasional tachypnea.   CV: Systolic murmur heard over right sternal border, radiated to clavicle. No murmur heard of mitral position. Regular rate.   Abdomen: Soft, mild tenderness to palpation in epigastric region.   MSK: Bilateral upper extremities with edema, likely 2/2 maintenance fluids.   SKIN: Dorsal surface of left foot with atypical ecchymosis across toes 2-4, concerning for ossler nodes. Tender to touch. Right foot diffusely tender. Bilateral palmar surfaces of hands diffusely erythematous but non-tender.     Data     I have personally reviewed the following data over the past 24 hrs:    16.6 (H)  \   11.4 (L)   / 199     147 (H) 113 (H) 18.8 /  225 (H)   3.5 25 0.74 \       ALT: 14 AST: 47 (H) AP: 266 (H) TBILI: 0.7   ALB: 2.1 (L) TOT PROTEIN: 6.8 LIPASE: N/A       Procal: N/A CRP: N/A Lactic Acid: 1.4         Imaging " results reviewed over the past 24 hrs:   Recent Results (from the past 24 hour(s))   XR Thoracic Lumbar Standing 2 Views    Narrative    EXAM: XR THORACIC LUMBAR STANDING 2 VIEWS  LOCATION: [facility]  DATE: 8/3/2023    INDICATION: AP LAT upright in brace; Eval widening T11 12 disc space  COMPARISON: 7/31/2023 and 8/2/2023.      Impression    IMPRESSION: Status post posterior spinal fusion with metallic hardware extending from T11 to S1. The hardware visualized on this study is intact. Again visualized is the loss of height along the superior endplate of the T11 vertebral body. This has   progressed in the interval. The height of the anterior portion of T11 is currently 6.3 mm as compared to 13 mm previously. This does lead to a mild kyphosis at the T10-T11 disc space level which has decreased in height in the interval and the left T11   pedicle screw is abutting the inferior endplate of the T10 vertebral body. Rest of the vertebral body heights visualized on this study are maintained.    CT Head w/o Contrast    Narrative    EXAM: CT HEAD W/O CONTRAST  LOCATION: [facility]  DATE: 8/3/2023    INDICATION: new onset AMS, unable to cooperate w  cranial nerve exam  COMPARISON: None.  TECHNIQUE: Routine CT Head without IV contrast. Multiplanar reformats. Dose reduction techniques were used.    FINDINGS:  Motion and streak artifact limit aspects of exam.    INTRACRANIAL CONTENTS: No intracranial hemorrhage, extraaxial collection, or mass effect.  No CT evidence of acute infarct. Mild presumed chronic small vessel ischemic changes. Normal ventricles and sulci.     VISUALIZED ORBITS/SINUSES/MASTOIDS: No intraorbital abnormality. No paranasal sinus mucosal disease. No middle ear or mastoid effusion.    BONES/SOFT TISSUES: No acute abnormality.      Impression    IMPRESSION:  1.  Motion artifact limits assessment.   2.  No acute intracranial process identified.

## 2023-08-04 NOTE — PLAN OF CARE
Problem: Plan of Care - These are the overarching goals to be used throughout the patient stay.    Goal: Absence of Hospital-Acquired Illness or Injury  Intervention: Identify and Manage Fall Risk  Recent Flowsheet Documentation  Taken 8/4/2023 0014 by Zena Charles RN  Safety Promotion/Fall Prevention:   activity supervised   lighting adjusted   safety round/check completed     Problem: Plan of Care - These are the overarching goals to be used throughout the patient stay.    Goal: Optimal Comfort and Wellbeing  Outcome: Progressing     Problem: Infection  Goal: Absence of Infection Signs and Symptoms  Intervention: Prevent or Manage Infection  Recent Flowsheet Documentation  Taken 8/4/2023 0014 by Zena Charles RN  Isolation Precautions: contact precautions maintained     Problem: Gas Exchange Impaired  Goal: Optimal Gas Exchange  Outcome: Progressing  Intervention: Optimize Oxygenation and Ventilation  Recent Flowsheet Documentation  Taken 8/4/2023 0014 by Zena Charles RN  Head of Bed (HOB) Positioning: HOB at 20-30 degrees   Goal Outcome Evaluation:  Pt is alert and oriented to self and situation. VSS. O2 sat 94% at 4L NC. Pt desats to 86-88% so O2 increased to 4L from 2L. No coughing heard but with SOB , Oxycodone 10 mg PRN given at 02AM. Pt slept between cares but noted to be restless while sleeping. Glucose at midnight is 297, given Novolog 2 units as not given at 10PM schedule. BG at 02AM is 322. On her brace in bed refused repositioning.

## 2023-08-05 PROBLEM — N17.9 ACUTE KIDNEY FAILURE, UNSPECIFIED (H): Status: ACTIVE | Noted: 2023-01-01

## 2023-08-05 NOTE — PROGRESS NOTES
Care Management Chart Review    Length of Stay (days): 8    Expected Discharge Date: 08/07/2023    Concerns to be Addressed:   Oxygen at 5 liters; IV Ancef;   Patient plan of care discussed at interdisciplinary rounds: Yes    Anticipated Discharge Disposition:  Long term care.      Anticipated Discharge Services:  Total assist.  Anticipated Discharge DME:  To be determined.     Patient/family educated on Medicare website which has current facility and service quality ratings:   NA  Education Provided on the Discharge Plan:   Per team  Patient/Family in Agreement with the Plan:   Yes    Referrals Placed by CM/SW:  [facility]   Private pay costs discussed: Not applicable at this time.    Additional Information:  Patient is a resident of [facility] long term care. Left a message for admissions to determine if patient could return over the weekend if medically ready.   12:55 PM: Per nurse at [facility], patient is not on oxygen at baseline.     Cyndie Lee RN

## 2023-08-05 NOTE — PLAN OF CARE
Goal Outcome Evaluation:                        Problem: Plan of Care - These are the overarching goals to be used throughout the patient stay.    Goal: Plan of Care Review  Description: The Plan of Care Review/Shift note should be completed every shift.  The Outcome Evaluation is a brief statement about your assessment that the patient is improving, declining, or no change.  This information will be displayed automatically on your shift note.  Outcome: Progressing     Problem: Plan of Care - These are the overarching goals to be used throughout the patient stay.    Goal: Optimal Comfort and Wellbeing  Outcome: Progressing     Problem: Infection  Goal: Absence of Infection Signs and Symptoms  Outcome: Progressing  Intervention: Prevent or Manage Infection  Recent Flowsheet Documentation  Taken 8/5/2023 0121 by Zena Charles, RN  Isolation Precautions: contact precautions maintained     Problem: Gas Exchange Impaired  Goal: Optimal Gas Exchange  Outcome: Progressing  Intervention: Optimize Oxygenation and Ventilation  Recent Flowsheet Documentation  Taken 8/5/2023 0121 by Zena Charles, RN  Head of Bed (HOB) Positioning: HOB at 20 degrees   Pt is alert and calm this shift. Able to tell yes or no if in pain but did not reply when asked for more information about her pain. VSS. On 5 LPM O2 per nasal.., O2 sat 92%. Pt was moaning at 0130, given Oxy 10 mg with apple sauce, effective. at 02AM. K level at 06AM was 3.7. Pt is more comfortable this night. Able to tell name, birthday and place correctly.

## 2023-08-05 NOTE — PLAN OF CARE
Patient has been alert and oriented to person and place, forgetful of time and situation.  She continues to have back pain. PRN oxycodone given x 1 and is somewhat helpful.  Patient still just has her corset brace, she is still waiting for her custom TLSO brace.  Patient is eating better today, with assistance. She ate 50% of breakfast by herself, and then 75% of lunch with assistance.  Blood glucose 213 and 261 this shift. Insulin was adjusted today, and carb count was added.  Will pass onto monitor. Keshia Hunter RN     Problem: Plan of Care - These are the overarching goals to be used throughout the patient stay.    Goal: Absence of Hospital-Acquired Illness or Injury  Outcome: Progressing  Intervention: Identify and Manage Fall Risk  Recent Flowsheet Documentation  Taken 8/5/2023 1000 by Keshia Hunter RN  Safety Promotion/Fall Prevention:   activity supervised   lighting adjusted   room door open   safety round/check completed   assistive device/personal items within reach  Intervention: Prevent Skin Injury  Recent Flowsheet Documentation  Taken 8/5/2023 1000 by Keshia Hunter RN  Body Position: position maintained  Goal: Optimal Comfort and Wellbeing  Outcome: Progressing  Intervention: Monitor Pain and Promote Comfort  Recent Flowsheet Documentation  Taken 8/5/2023 0914 by Keshia Hunter RN  Pain Management Interventions: medication (see MAR)     Problem: Plan of Care - These are the overarching goals to be used throughout the patient stay.    Goal: Optimal Comfort and Wellbeing  Intervention: Monitor Pain and Promote Comfort  Recent Flowsheet Documentation  Taken 8/5/2023 0914 by Keshia Hunter RN  Pain Management Interventions: medication (see MAR)     Problem: Infection  Goal: Absence of Infection Signs and Symptoms  Outcome: Progressing  Intervention: Prevent or Manage Infection  Recent Flowsheet Documentation  Taken 8/5/2023 1000 by Keshia Hunter RN  Isolation  Precautions: contact precautions maintained   Goal Outcome Evaluation:

## 2023-08-05 NOTE — PROGRESS NOTES
Chart reviewed. Waiting custom TLSO. Bedrest until custom in place. Will need repeat XR after custom in place. See prior note re: discussion with [facility] Follow-up vs transfer to [place]. BC continue to be negative. Will follow peripherally.     MYESHA Diaz-CNP  [facility] Neurosurgery  O: [phone]

## 2023-08-05 NOTE — PLAN OF CARE
"  Problem: Plan of Care - These are the overarching goals to be used throughout the patient stay.    Goal: Plan of Care Review  Description: The Plan of Care Review/Shift note should be completed every shift.  The Outcome Evaluation is a brief statement about your assessment that the patient is improving, declining, or no change.  This information will be displayed automatically on your shift note.  Outcome: Progressing  Goal: Patient-Specific Goal (Individualized)  Description: You can add care plan individualizations to a care plan. Examples of Individualization might be:  \"Parent requests to be called daily at 9am for status\", \"I have a hard time hearing out of my right ear\", or \"Do not touch me to wake me up as it startles me\".  Outcome: Progressing  Goal: Absence of Hospital-Acquired Illness or Injury  Outcome: Progressing  Goal: Optimal Comfort and Wellbeing  Outcome: Progressing  Goal: Readiness for Transition of Care  Outcome: Progressing     Problem: Infection  Goal: Absence of Infection Signs and Symptoms  Outcome: Progressing  Intervention: Prevent or Manage Infection  Recent Flowsheet Documentation  Taken 8/4/2023 1600 by Charlene Bledsoe RN  Isolation Precautions: contact precautions maintained     Problem: Oral Intake Inadequate  Goal: Improved Oral Intake  Outcome: Progressing     Problem: Gas Exchange Impaired  Goal: Optimal Gas Exchange  Outcome: Progressing   Goal Outcome Evaluation:       Pt is alert and confused, but able to communicate her needs. Writer feed her supper and ate 100% that was ordered. Pt was on bed rest during the shift. Vitals are stable and will continue to monitor.                  "

## 2023-08-05 NOTE — PROGRESS NOTES
[facility]    Medicine Progress Note - Hospitalist Service    Date of Admission:  7/28/2023    Assessment & Plan   Ginna Mireles is a 67 year old female w/PMHx of T2DM on insulin, liver cirrhosis, HTN, HLD, CAD, COPD, remote EtOH use disorder, bipolar disorder, and chronic pain admitted on 7/28/2023 for lethargy and fevers, found to be septic with MSSA bacteremia and unknown source. LINO completed on 8/2/23 and did not show vegetations. MRI lumbar and thoracic with concern for epidural abscess. ID and Neurosurgery do not feel this requires surgical intervention. Will require prolonged course of outpatient IV antibiotics. Patient noted to have increasing oxygen needs, initially felt to be secondary to fluid overloaded status. Diuresed with some improvement but ongoing need for supplemental oxygen. Chest XR today reveals large partially loculated pleural effusion on the right. Pulmonology consulted and thoracentesis ordered.     MSSA bacteremia  LINO without vegetations   MRI with concern for small epidural abscess  Acute metabolic encephalopathy   Patient presented with fevers of 103 degrees and AMS. She is usually communicative and uses a walker at baseline; found down in facility. Positive blood culture x 4 days concerning for high grade MSSA bacteremia. TTE performed 7/29 did not show signs of vegetations. MRI lumbar and thoracic did reveal area of fluid collection concerning for possible epidural abscess, but ID and neurosurgery agree that this is not an operative infection. Will continue IV antibiotics, expect prolonged course. PICC placed.    - ID consulted    - LINO completed 8/2/23 and negative for vegetations    - MRI lumbar and thoracic with concerns for small epidural abscess, non-operative    - MRI cervical and head not obtained, patient refusing 2/2 pain     - Discontinue daily blood cultures   - Continue cefazolin 2g Q8H  - PICC placement today   - cleared to discharge from ID  perspective   - Neurosurgery consulted    - Agree with non-operative infection as noted above   - patient will be fitted for TLSO today for additional support for moved hardware in her back.    - awaiting final recommendations     Hypernatremia  Post-Obstructive Diuresis   Patient noted to have sodium levels of 150+ following admission. Felt to most likely be secondary to dry fluid status in the setting of initial urinary retention and significant post-operative diuresis. Hypernatremia improved with aggressive hypotonic maintenance fluids, but fluids discontinued in the setting of worsening edema and shortness of breath. SOB improved with furosemide, however sodium increased to 153. Will hold on further diuresis for now but will have to be thoughtful about continued maintenance fluids in the setting of increasing oxygen need and newly identified worsening pleural effusion, see below.    - Trend sodium with daily BMP  - HOLD further diuresis given hypernatremia   - HOLD further IVF given worsening respiratory status, see below    - Continue to monitor fluid status     Increasing Oxygen Needs   Pleural Effusion   Patient initially admitted for sepsis, concern for pneumonia as source as imaging on admission revealed consolidation in the right lower lobe in addition to small right pleural effusion. Has veen on broad spectrum abx. Patient has had slowly increasing oxygen needs coinciding with aggressive fluids for hypernatremia, see above. Some improvement with diuresis. Chest XR obtained today unfortunately shows large partially loculated pleural effusion on the right -- concern for empyema vs malignancy.    - Thoracentesis + fluid studies    - Pulm consult     Type II Diabetes   Patient with known diagnosis of type II diabetes. She did have an AGMA on admission, but felt to be more likely 2/2 lactic acidosis. Home diabetes regimen is as follows: 34 units lantus HS + 13 units aspart at breakfast + 14 units aspart at  lunch + 34 aspart at dinner + metformin + bydureon + canagliflozin. Hemoglobin A1c on this admission is 8.2. Sugars have continued to be difficult to control. Will increase intensity of insulin regimen again today.    - Hold PTA metformin, bydureon, canagliflozin.    - 35 units lantus HS    - 1:5 carb counting    - Very high resistance sliding scale regimen     RLQ pain   H/o cirrhosis 2/2 EtOH use disorder   Transaminitis, improving   Per chart review, patient has remote h/o EtOH use disorder, likely causing cirrhosis. Patient endorsed pain in RLQ  on admission. CT abdomen also did not show ascites, which is reassuring. Transaminases with slight bump 8/4/23 with no change to abdominal exam or pain beyond baseline.   - CMP daily    DK - resolved   Cr 1.34 on admission, likely prerenal in setting of sepsis discussed above. Creatinine normalized.   - Daily CMP     Hypokalemia  - RN replacement protocol    Elevated trops  Trop 37 and 35 on repeat on admission. Likely demand ischemia in setting of infection discussed above. EKG on admission w/o acute ischemic changes.     Constipation  - Scheduled miralax daily   - Senna PRN    Chronic Conditions:  HTN   - Continue PTA lisinopril 5mg daily   - Hold PTA hydrochlorothiazide 50mg daily     HLD  - Hold PTA pravastatin    Bipolar disorder    - Continue PTA lamotrigine 200mg PO BID  - Continue PTA olanzapine 2.5mg PO daily   - Hold PTA duloxetine 30mg PO daily.   - Hold PTA mirtazapine 45mg PO daily   - Hold PTA rexulti 4mg PO daily     Chronic pain  - Tylenol 500mg QID PRN  - Continue PTA oxycodone 10mg Q4H PRN  - Hold PTA flexeril 10mg TID PRN  - Hold PTA tizanidine 2mg BID Q6H PRN    Acquired hypothyroidism  - Continue PTA synthroid     Diet: Regular Diet Adult  Snacks/Supplements Adult: Magic Cup; With Meals  Snacks/Supplements Adult: Glucerna; With Meals    DVT Prophylaxis: Enoxaparin (Lovenox) SQ  [place] Catheter: PRESENT, indication: Retention  Lines: PRESENT      PICC  "08/04/23 Single Lumen Right Basilic IV antibiotic-Site Assessment: WDL    Cardiac Monitoring: None  Code Status: No CPR- Do NOT Intubate      Clinically Significant Risk Factors         # Hypernatremia: Highest Na = 153 mmol/L in last 2 days, will monitor as appropriate   # Hypercalcemia: corrected calcium is >10.1, will monitor as appropriate    # Hypoalbuminemia: Lowest albumin = 2.1 g/dL at 8/5/2023  5:36 AM, will monitor as appropriate           # Hypertension: Noted on problem list       # DMII: A1C = 8.2 % (Ref range: <5.7 %) within past 6 months   # Obesity: Estimated body mass index is 39.36 kg/m  as calculated from the following:    Height as of this encounter: 1.651 m (5' 5\").    Weight as of this encounter: 107.3 kg (236 lb 8.9 oz).             The patient's care was discussed with Dr Sebas Roque MD PGY-2  [facility]    ______________________________________________________________________    Interval History   Overnight events reviewed. Patient continues to require 4-5L oxygen. Overall looks clinically stable to improved, however continues to endorse 9/10 pain.     Physical Exam   Vital Signs: Temp: 99.1  F (37.3  C) Temp src: Oral BP: 100/53 Pulse: 101   Resp: 18 SpO2: 92 % O2 Device: Nasal cannula Oxygen Delivery: 4 LPM  Weight: 236 lbs 8.86 oz  GENERAL: Lethergic but arouses to loud voice. Answers in one word questions.   EYES: Eyes grossly normal to inspection.    RESP:  Difficult pulmonary exam 2/2 body habitus and inability to reposition. Shallow breaths with occasional tachypnea. On 4L NC.   CV: Systolic murmur heard over right sternal border, radiated to clavicle. No murmur heard of mitral position. Regular rate.   Abdomen: Soft, mild tenderness to palpation in epigastric region.   MSK: Bilateral upper extremities with edema, likely 2/2 maintenance fluids.   SKIN: Dorsal surface of left foot with atypical ecchymosis across toes 2-4, concerning for ossler " nodes. Tender to touch. Right foot diffusely tender. Bilateral palmar surfaces of hands diffusely erythematous but non-tender.     Data     I have personally reviewed the following data over the past 24 hrs:    11.9 (H)  \   10.5 (L)   / 151     153 (H) 117 (H) 21.1 /  261 (H)   3.7 29 0.81 \     ALT: 12 AST: 40 AP: 244 (H) TBILI: 0.5   ALB: 2.1 (L) TOT PROTEIN: 6.7 LIPASE: N/A       Imaging results reviewed over the past 24 hrs:   Recent Results (from the past 24 hour(s))   XR Chest Port 1 View    Narrative    EXAM: XR CHEST PORT 1 VIEW  LOCATION: [facility]  DATE: 8/5/2023    INDICATION: increasing oxygen needs  COMPARISON: 07/20/2023      Impression    IMPRESSION: Nearly opacified right hemithorax, suggestive of a large partially loculated right effusion. Left lung clear.

## 2023-08-05 NOTE — CONSULTS
"PULMONARY/CRITICAL CARE CONSULT NOTE    Date / Time of Admission:  7/28/2023 12:25 PM  Assessment:   67yoF with MSSA bacteremia, small cervical epidural abscess deemed not necessary for evacuation by neurosurgery, Cirrhosis, bipolar disorder who has now developed large right-sided pleural effusion.     Clinically Significant Risk Factors         # Hypernatremia: Highest Na = 153 mmol/L in last 2 days, will monitor as appropriate   # Hypercalcemia: corrected calcium is >10.1, will monitor as appropriate    # Hypoalbuminemia: Lowest albumin = 2.1 g/dL at 8/5/2023  5:36 AM, will monitor as appropriate     # Hypertension: Noted on problem list       # DMII: A1C = 8.2 % (Ref range: <5.7 %) within past 6 months   # Obesity: Estimated body mass index is 39.36 kg/m  as calculated from the following:    Height as of this encounter: 1.651 m (5' 5\").    Weight as of this encounter: 107.3 kg (236 lb 8.9 oz).          Code Status: No CPR- Do NOT Intubate       Principal Problem:    Community acquired pneumonia of left lower lobe of lung  Active Problems:    Left leg cellulitis    Altered mental status, unspecified altered mental status type    Sepsis, due to unspecified organism, unspecified whether acute organ dysfunction present (H)    DK (acute kidney injury) (H)    Type 2 diabetes mellitus with hyperglycemia, with long-term current use of insulin (H)    Gram-positive bacteremia    Acute kidney failure, unspecified (H)    Plan:   -Unable to drain today--patient refusing  -Recommend thoracentesis if patient is willing. Added pH, cytology and fungal cultures to fluid orders.  -I don't think she is decisional currently--will add ammonia to am labs given what I think looks like asterixis.  -Long-term prognosis guarded due to cirrhosis and bacteremia. Suggest palliative care consult Monday as patient is setting limits on what she is willing to undergo.         Subjective:    cc: pleural effusion.     HPI: 67yoF with cirrhosis " "(?ETOH remote use), bipolar disorder, DMII residing in nursing home with L3-S1 hardware removal and posterior fusion T10-L3 January 2023 presented 7/28 with fever and altered mental status. She was found to have MSSA bacteremia. Her mental status has continued to wax and wane. She underwent LINO that found no vegetations. MRI found indeterminate epidural fluid collection in lower cervical spine. Neurosurgery was consulted and did not feel intervention warranted. Her T11 end-plate deformity had increased since imaging April 2023 (done by [facility]). They were aware of this finding in April. She was fitted with TLSO brace for when out of bed.     She had an increase in oxygen requirements yesterday and chest Xray shows large right-sided effusion. Patient is very slow to answer questions today. She reports her breathing has improved from earlier in the day but won't answer what she thinks helped it improve. She requested to further \"testing\" be done.     PMHx:  Bipolar  DMII  Obesity  Cirrhosis  Chronic back pain with T10-L3 fusion    Social History     Tobacco Use    Smoking status: Never    Smokeless tobacco: Never   Substance Use Topics    Alcohol use: Not on file       Family History   Problem Relation Age of Onset    Cataracts Mother     Lung Cancer Father     HIV/AIDS Brother     Diabetes Sister        Current Facility-Administered Medications   Medication    acetaminophen (TYLENOL) tablet 500 mg    bisacodyl (DULCOLAX) EC tablet 5 mg    Or    bisacodyl (DULCOLAX) EC tablet 10 mg    ceFAZolin (ANCEF) 2 g in 100 mL D5W intermittent infusion    glucose gel 15-30 g    Or    dextrose 50 % injection 25-50 mL    Or    glucagon injection 1 mg    glucose gel 15-30 g    Or    dextrose 50 % injection 25-50 mL    Or    glucagon injection 1 mg    [Held by provider] DULoxetine (CYMBALTA) DR capsule 30 mg    enoxaparin ANTICOAGULANT (LOVENOX) injection 40 mg    insulin aspart (NovoLOG) injection (RAPID ACTING)    insulin " "aspart (NovoLOG) injection (RAPID ACTING)    insulin aspart (NovoLOG) injection (RAPID ACTING)    insulin aspart (NovoLOG) injection (RAPID ACTING)    insulin aspart (NovoLOG) injection (RAPID ACTING)    insulin aspart (NovoLOG) injection (RAPID ACTING)    insulin glargine (LANTUS PEN) injection 38 Units    lamoTRIgine (LaMICtal) tablet 200 mg    levothyroxine (SYNTHROID/LEVOTHROID) tablet 137 mcg    lidocaine (LMX4) cream    lidocaine (LMX4) cream    lidocaine 1 % 0.1-1 mL    lidocaine 1 % 0.1-5 mL    lisinopril (ZESTRIL) tablet 2.5 mg    melatonin tablet 1 mg    miconazole (MICATIN) 2 % powder    multivitamin w/minerals (THERA-VIT-M) tablet 1 tablet    naloxone (NARCAN) injection 0.2 mg    Or    naloxone (NARCAN) injection 0.4 mg    Or    naloxone (NARCAN) injection 0.2 mg    Or    naloxone (NARCAN) injection 0.4 mg    OLANZapine (zyPREXA) tablet 2.5 mg    ondansetron (ZOFRAN ODT) ODT tab 4 mg    Or    ondansetron (ZOFRAN) injection 4 mg    oxyCODONE (ROXICODONE) tablet 5-10 mg    pantoprazole (PROTONIX) EC tablet 40 mg    polyethylene glycol (MIRALAX) Packet 17 g    prochlorperazine (COMPAZINE) injection 5 mg    Or    prochlorperazine (COMPAZINE) tablet 5 mg    Or    prochlorperazine (COMPAZINE) suppository 12.5 mg    sodium chloride (PF) 0.9% PF flush 10-40 mL    sodium chloride (PF) 0.9% PF flush 3 mL    sodium chloride (PF) 0.9% PF flush 3 mL    sodium chloride 0.9% infusion         Review of Systems: 12-point Review performed and negative aside from that noted in HPI.    Objective:    Vital signs:  /53 (BP Location: Right arm)   Pulse 101   Temp 99.1  F (37.3  C) (Oral)   Resp 18   Ht 1.651 m (5' 5\")   Wt 107.3 kg (236 lb 8.9 oz)   SpO2 92%   BMI 39.36 kg/m      GENERAL APPEARANCE: disheveled, in no distress, flat affect     EYES: EOMI, - PERRL     NECK: spider hemangiomas present     RESP: decreased breath sounds on right     CV: regular rates and rhythm, normal S1 S2, no S3 or S4 and no murmur, " click or rub -     ABDOMEN: distended but non-tender     MS: pitting LE edema     SKIN: no suspicious lesions or rashes. Significant ecchymosis on arms from Ivs.      NEURO: slow to respond. Frequent jerking motions. Moving all 4s.      LYMPHATICS: No axillary, cervical, inguinal, or supraclavicular nodes        Data    CXR: personally reviewed  Large right-sided pleural effusion    Laboratory:  Results for orders placed or performed during the hospital encounter of 07/28/23   Basic metabolic panel   Result Value Ref Range    Sodium 149 (H) 136 - 145 mmol/L    Potassium 3.7 3.4 - 5.3 mmol/L    Chloride 115 (H) 98 - 107 mmol/L    Carbon Dioxide (CO2) 22 22 - 29 mmol/L    Anion Gap 12 7 - 15 mmol/L    Urea Nitrogen 20.3 8.0 - 23.0 mg/dL    Creatinine 0.65 0.51 - 0.95 mg/dL    Calcium 10.2 8.8 - 10.2 mg/dL    Glucose 263 (H) 70 - 99 mg/dL    GFR Estimate >90 >60 mL/min/1.73m2     Lab Results   Component Value Date    WBC 11.9 (H) 08/05/2023    HGB 10.5 (L) 08/05/2023    HCT 34.5 (L) 08/05/2023    MCV 96 08/05/2023     08/05/2023

## 2023-08-06 NOTE — PLAN OF CARE
Problem: Plan of Care - These are the overarching goals to be used throughout the patient stay.    Goal: Plan of Care Review  Description: The Plan of Care Review/Shift note should be completed every shift.  The Outcome Evaluation is a brief statement about your assessment that the patient is improving, declining, or no change.  This information will be displayed automatically on your shift note.  Outcome: Progressing     Problem: Plan of Care - These are the overarching goals to be used throughout the patient stay.    Goal: Optimal Comfort and Wellbeing  Outcome: Progressing  Intervention: Monitor Pain and Promote Comfort  Recent Flowsheet Documentation  Taken 8/6/2023 0003 by Zena Charles RN  Pain Management Interventions: medication (see MAR)     Problem: Gas Exchange Impaired  Goal: Optimal Gas Exchange  Outcome: Progressing  Intervention: Optimize Oxygenation and Ventilation  Recent Flowsheet Documentation  Taken 8/6/2023 0003 by Zena Charles RN  Head of Bed (HOB) Positioning: HOB at 30-45 degrees     Problem: Pain Acute  Goal: Optimal Pain Control and Function  Intervention: Develop Pain Management Plan  Recent Flowsheet Documentation  Taken 8/6/2023 0003 by Zena Charles RN  Pain Management Interventions: medication (see MAR)  Intervention: Prevent or Manage Pain  Recent Flowsheet Documentation  Taken 8/6/2023 0003 by Zena Charles RN  Medication Review/Management: medications reviewed   Goal Outcome Evaluation:  Pt is alert and calm. C/o pain to back and asked for pain meds. She appears to be anxious a bit. Given Oxy 5 mg. VSS, O2 sat 92% at 4 L NC. Sleeping good.

## 2023-08-06 NOTE — PLAN OF CARE
Patient alert and oriented to time and place, disoriented to time and situation fluctuates.  She continues to have back pain. PRN oxycodone given x 1 and somewhat helpful.  She was scheduled for thoracentesis today, but refused. MD's aware.  Patient went down for CT of spine earlier today.  Vitals stable. Passed onto monitor. Keshia Hunter RN     Problem: Plan of Care - These are the overarching goals to be used throughout the patient stay.    Goal: Absence of Hospital-Acquired Illness or Injury  Outcome: Progressing  Intervention: Identify and Manage Fall Risk  Recent Flowsheet Documentation  Taken 8/6/2023 1000 by Keshia Hunter RN  Safety Promotion/Fall Prevention:   activity supervised   lighting adjusted   room door open   safety round/check completed   assistive device/personal items within reach  Intervention: Prevent Skin Injury  Recent Flowsheet Documentation  Taken 8/6/2023 1000 by Keshia Hunter RN  Body Position: position maintained  Goal: Optimal Comfort and Wellbeing  Outcome: Progressing  Intervention: Monitor Pain and Promote Comfort  Recent Flowsheet Documentation  Taken 8/6/2023 0854 by Keshia Hunter RN  Pain Management Interventions: medication (see MAR)     Problem: Infection  Goal: Absence of Infection Signs and Symptoms  Outcome: Progressing  Intervention: Prevent or Manage Infection  Recent Flowsheet Documentation  Taken 8/6/2023 1000 by Keshia Hunter RN  Isolation Precautions: contact precautions maintained   Goal Outcome Evaluation:

## 2023-08-06 NOTE — PROGRESS NOTES
PULMONARY PROGRESS NOTE    Date / Time of Admission:  7/28/2023 12:25 PM  Assessment:   67yoF with MSSA bacteremia, small cervical epidural abscess deemed not necessary for evacuation by neurosurgery, Cirrhosis, bipolar disorder who has now developed large right-sided pleural effusion.      Principal Problem:    Community acquired pneumonia of left lower lobe of lung  Active Problems:    Left leg cellulitis    Altered mental status, unspecified altered mental status type    Sepsis, due to unspecified organism, unspecified whether acute organ dysfunction present (H)    DK (acute kidney injury) (H)    Type 2 diabetes mellitus with hyperglycemia, with long-term current use of insulin (H)    Gram-positive bacteremia    Acute kidney failure, unspecified (H)        Plan:   Patient was verbally agreeable to thoracentesis today. More alert and interactive than yesterday.   US thoracentesis pending. Positioning will be a challenge given her Spine issues   Will follow up fluid. Hoping her oxygen requirement will resolve once fluid drained.      Subjective:   HPI:  Ginna Mireles is a 67 year old female with cirrhosis (?ETOH remote use), bipolar disorder, DMII residing in nursing home with L3-S1 hardware removal and posterior fusion T10-L3 January 2023 presented 7/28 with fever and altered mental status. She was found to have MSSA bacteremia. Her mental status has continued to wax and wane. She underwent LINO that found no vegetations. MRI found indeterminate epidural fluid collection in lower cervical spine. Neurosurgery was consulted and did not feel intervention warranted. Her T11 end-plate deformity had increased since imaging April 2023 (done by [facility]). They were aware of this finding in April. She was fitted with TLSO brace for when out of bed.      She had an increase in oxygen requirements yesterday and chest Xray shows large right-sided effusion.     No change in oxygen requirement or breathing overnight. She  "reports her breathing is not labored and no cough. More alert today.         Allergies:   Allergies   Allergen Reactions    Hydroxyzine     Mushroom     Penicillins      Hives      Tamiflu [Oseltamivir]         MEDS:  Scheduled Meds:   ceFAZolin  2 g Intravenous Q8H    [Held by provider] DULoxetine  30 mg Oral Daily    enoxaparin ANTICOAGULANT  40 mg Subcutaneous Q24H    insulin aspart   Subcutaneous Daily with breakfast    insulin aspart   Subcutaneous Daily with lunch    insulin aspart   Subcutaneous Daily with supper    insulin aspart  1-10 Units Subcutaneous TID AC    insulin aspart  1-7 Units Subcutaneous At Bedtime    insulin glargine  38 Units Subcutaneous At Bedtime    lamoTRIgine  200 mg Oral BID    levothyroxine  137 mcg Oral Daily    lisinopril  2.5 mg Oral Daily    miconazole   Topical BID    multivitamin w/minerals  1 tablet Oral Daily    OLANZapine  2.5 mg Oral At Bedtime    pantoprazole  40 mg Oral QAM AC    polyethylene glycol  17 g Oral Daily    sodium chloride (PF)  3 mL Intracatheter Q8H     Continuous Infusions:   sodium chloride Stopped (08/02/23 1000)     PRN Meds:.acetaminophen, bisacodyl **OR** bisacodyl, glucose **OR** dextrose **OR** glucagon, glucose **OR** dextrose **OR** glucagon, insulin aspart, lidocaine 4%, lidocaine 4%, lidocaine (buffered or not buffered), lidocaine (buffered or not buffered), melatonin, naloxone **OR** naloxone **OR** naloxone **OR** naloxone, ondansetron **OR** ondansetron, oxyCODONE IR, prochlorperazine **OR** prochlorperazine **OR** prochlorperazine, sodium chloride (PF), sodium chloride (PF)    Objective:   VITALS:  /52 (BP Location: Left arm)   Pulse 88   Temp 98.7  F (37.1  C) (Oral)   Resp 18   Ht 1.651 m (5' 5\")   Wt 107.3 kg (236 lb 8.9 oz)   SpO2 97%   BMI 39.36 kg/m    EXAM:    GENERAL APPEARANCE: disheveled, in no distress, flat affect     EYES: EOMI, - PERRL     NECK: spider hemangiomas present     RESP: decreased breath sounds on right    "  CV: regular rates and rhythm, normal S1 S2, no S3 or S4 and no murmur, click or rub -     ABDOMEN: distended but non-tender     MS: pitting LE edema     SKIN: no suspicious lesions or rashes. Significant ecchymosis on arms from Ivs.      NEURO: moving all 4 extremities, less jerking than yesterday, answering appropriately.      LYMPHATICS: No axillary, cervical, inguinal, or supraclavicular nodes        I&O:    Date 08/06/23 0700 - 08/07/23 0659   Shift 5192-2163 [id number] [id number] 24 Hour Total   INTAKE   I.V. 10   10   Shift Total(mL/kg) 10(0.09)   10(0.09)   OUTPUT   Shift Total(mL/kg)       Weight (kg) 107.3 107.3 107.3 107.3       Data Review:    Imaging: personally reviewed  Chest Xray  EXAM: XR CHEST PORT 1 VIEW  LOCATION: [facility]  DATE: 8/5/2023     INDICATION: increasing oxygen needs  COMPARISON: 07/20/2023                                                                      IMPRESSION: Nearly opacified right hemithorax, suggestive of a large partially loculated right effusion. Left lung clear.    Results for orders placed or performed during the hospital encounter of 07/28/23   Basic metabolic panel   Result Value Ref Range    Sodium 149 (H) 136 - 145 mmol/L    Potassium 3.7 3.4 - 5.3 mmol/L    Chloride 115 (H) 98 - 107 mmol/L    Carbon Dioxide (CO2) 22 22 - 29 mmol/L    Anion Gap 12 7 - 15 mmol/L    Urea Nitrogen 20.3 8.0 - 23.0 mg/dL    Creatinine 0.65 0.51 - 0.95 mg/dL    Calcium 10.2 8.8 - 10.2 mg/dL    Glucose 263 (H) 70 - 99 mg/dL    GFR Estimate >90 >60 mL/min/1.73m2     Lab Results   Component Value Date    WBC 8.9 08/06/2023    HGB 10.1 (L) 08/06/2023    HCT 33.6 (L) 08/06/2023    MCV 97 08/06/2023     08/06/2023

## 2023-08-06 NOTE — PROGRESS NOTES
[facility]    Medicine Progress Note - Hospitalist Service    Date of Admission:  7/28/2023    Assessment & Plan   Ginna Mireles is a 67 year old female w/PMHx of T2DM on insulin, liver cirrhosis, HTN, HLD, CAD, COPD, remote EtOH use disorder, bipolar disorder, and chronic pain admitted on 7/28/2023 for lethargy and fevers, found to be septic with MSSA bacteremia and unknown source. LINO completed on 8/2/23 and did not show vegetations. MRI lumbar and thoracic with concern for epidural abscess. ID and Neurosurgery do not feel this requires surgical intervention. Will require prolonged course of outpatient IV antibiotics. Patient noted to have increasing oxygen needs, initially felt to be secondary to fluid overloaded status. Diuresed with some improvement but ongoing need for supplemental oxygen. Chest XR 8/6/23 reveals large partially loculated pleural effusion on the right. Pulmonology consulted and thoracentesis ordered. Patient intermittently refusing interventions, unclear if she has decision making capacity. Having trouble reaching her living facility and sister. Palliative consult may be indicated if representative cannot be reached.     MSSA bacteremia  LINO without vegetations   MRI with concern for small epidural abscess  Loculated Pleural Effusion of Right Lung   Acute metabolic encephalopathy   Patient presented with fevers of 103 degrees and AMS. She is usually communicative and uses a walker at baseline; found down in facility. Positive blood culture x 4 days concerning for high grade MSSA bacteremia. TTE performed 7/29 did not show signs of vegetations. MRI lumbar and thoracic did reveal area of fluid collection concerning for possible epidural abscess, but ID and neurosurgery agree that this is not an operative infection. Patient with gradually increasing oxygen needs, chest XR obtained on 8/5/23 showed large loculated pleural effusion on the right. Pulmonology consulted and  agree with thoracentesis. Unfortunately, patient intermittently refusing interventions including thoracentesis. Source control cannot be determined without this intervention. Unclear if she has decision making capacity and representative (sister, living facility) cannot be reached. Will continue IV antibiotics.    - ID consulted    - LINO completed 8/2/23 and negative for vegetations    - MRI lumbar and thoracic with concerns for small epidural abscess, non-operative    - MRI cervical and head not obtained, patient refused 2/2 pain    - Chest XR shows pleural effusion concerning for worsening pneumonia, patient refusing thoracentesis     - Discontinue daily blood cultures   - Continue cefazolin 2g Q8H  - Neurosurgery consulted    - Agree with non-operative infection as noted above   - TLSO brace, patient intermittently refusing    - CT lumbar and thoracic for further evaluation of hardware   - Pulmonology consulted    - Thoracentesis, patient intermittently refusing     Increasing Oxygen Needs   Pleural Effusion   Patient initially admitted for sepsis, concern for pneumonia as source as imaging on admission revealed consolidation in the right lower lobe in addition to small right pleural effusion. Has been on broad spectrum abx. Patient with slowly increasing oxygen needs coinciding with aggressive fluids for hypernatremia, see above. Some improvement with diuresis. Chest XR obtained today unfortunately shows large partially loculated pleural effusion on the right.    - Thoracentesis + fluid studies, patient current refusing    - Pulmonology consult     Decision Making Capacity    Patient reportedly communicative and independent at baseline. However, patient is not demonstrating consistent ability to understand risks and benefits of proposed interventions. Attempted to call sister, but listed phone number is out of order. Attempted to call living facility x 4 with no answer. If we cannot contact a representative for  this patient, a palliative consult is likely warranted. If patients respiratory status acutely worsens overnight, sedation for thoracentesis may be indicated.    - SW assisting in attempt to contact living facility, next of kin    - Likely will require palliative consult     Hypernatremia  Post-Obstructive Diuresis   Treatment Complicated by Fluid Status   Patient noted to have sodium levels of 150+ following admission. Felt to most likely be secondary to dry fluid status in the setting of initial urinary retention and significant post-operative diuresis. Hypernatremia improved with aggressive hypotonic maintenance fluids, but fluids discontinued in the setting of worsening edema and shortness of breath. SOB improved with furosemide, however sodium increased to 153. Will hold on further diuresis for now but will have to be thoughtful about continued maintenance fluids in the setting of increasing respiratory distress and newly identified worsening pleural effusion, see below.    - Trend sodium with daily BMP  - HOLD further diuresis given hypernatremia   - HOLD further IVF given worsening respiratory status, see below    - Continue to monitor fluid status     Type II Diabetes   Patient with known diagnosis of type II diabetes. She did have an AGMA on admission, but felt to be more likely 2/2 lactic acidosis. Home diabetes regimen is as follows: 34 units lantus HS + 13 units aspart at breakfast + 14 units aspart at lunch + 34 aspart at dinner + metformin + bydureon + canagliflozin. Hemoglobin A1c on this admission is 8.2. Sugars have continued to be difficult to control. Will increase intensity of insulin regimen again today.    - Hold PTA metformin, bydureon, canagliflozin.    - Increased to 42 units lantus HS    - 1:5 carb counting    - Very high resistance sliding scale regimen     RLQ pain   H/o cirrhosis 2/2 EtOH use disorder   Transaminitis, improving   Per chart review, patient has remote h/o EtOH use  disorder, likely causing cirrhosis. Patient endorsed pain in RLQ  on admission. CT abdomen also did not show ascites, which is reassuring.   - CMP daily    DK - resolved   Cr 1.34 on admission, likely prerenal in setting of sepsis discussed above. Creatinine normalized.   - Daily CMP     Hypokalemia  - RN replacement protocol    Elevated trops  Trop 37 and 35 on repeat on admission. Likely demand ischemia in setting of infection discussed above. EKG on admission w/o acute ischemic changes.     Constipation  - Scheduled miralax daily   - Senna PRN    Chronic Conditions:  HTN   - Continue PTA lisinopril 5mg daily   - Hold PTA hydrochlorothiazide 50mg daily     HLD  - Hold PTA pravastatin    Bipolar disorder    - Continue PTA lamotrigine 200mg PO BID  - Continue PTA olanzapine 2.5mg PO daily   - Hold PTA duloxetine 30mg PO daily.   - Hold PTA mirtazapine 45mg PO daily   - Hold PTA rexulti 4mg PO daily     Chronic pain  - Tylenol 500mg QID PRN  - Continue PTA oxycodone 10mg Q4H PRN  - Hold PTA flexeril 10mg TID PRN  - Hold PTA tizanidine 2mg BID Q6H PRN    Acquired hypothyroidism  - Continue PTA synthroid     Diet: Regular Diet Adult  Snacks/Supplements Adult: Magic Cup; With Meals  Snacks/Supplements Adult: Glucerna; With Meals    DVT Prophylaxis: Enoxaparin (Lovenox) SQ  [place] Catheter: PRESENT, indication: Retention  Lines: PRESENT      PICC 08/04/23 Single Lumen Right Basilic IV antibiotic-Site Assessment: WDL    Cardiac Monitoring: None  Code Status: No CPR- Do NOT Intubate      Clinically Significant Risk Factors         # Hypernatremia: Highest Na = 153 mmol/L in last 2 days, will monitor as appropriate   # Hypercalcemia: corrected calcium is >10.1, will monitor as appropriate    # Hypoalbuminemia: Lowest albumin = 2 g/dL at 8/6/2023  5:43 AM, will monitor as appropriate           # Hypertension: Noted on problem list       # DMII: A1C = 8.2 % (Ref range: <5.7 %) within past 6 months   # Obesity: Estimated body  "mass index is 39.36 kg/m  as calculated from the following:    Height as of this encounter: 1.651 m (5' 5\").    Weight as of this encounter: 107.3 kg (236 lb 8.9 oz).             The patient's care was discussed with Dr Sebas Roque MD PGY-2  [facility]    ______________________________________________________________________    Interval History   Patient transiently lucid today. Intermittently agreeing to interventions including thoracentesis. Ultimately declining today. May need to involve palliative. While patient does not entirely demonstrate decisional capacity, she is able to communicate that she does not want any more tests.     Physical Exam   Vital Signs: Temp: 98.7  F (37.1  C) Temp src: Oral BP: 103/52 Pulse: 88   Resp: 18 SpO2: 97 % O2 Device: Nasal cannula Oxygen Delivery: 4 LPM  Weight: 236 lbs 8.86 oz  GENERAL: Lethergic but arouses to loud voice. Answers in short sentences.   EYES: Eyes grossly normal to inspection.    RESP:  Difficult pulmonary exam 2/2 body habitus and inability to reposition. Shallow breaths with occasional tachypnea. On 4L NC.   CV: Systolic murmur heard over right sternal border, radiated to clavicle. No murmur heard of mitral position. Regular rate.   Abdomen: Soft, mild tenderness to palpation in epigastric region.   MSK: Bilateral upper extremities with edema, likely 2/2 maintenance fluids.   SKIN: Dorsal surface of left foot with atypical ecchymosis across toes 2-4, concerning for ossler nodes. Tender to touch. Right foot diffusely tender. Bilateral palmar surfaces of hands diffusely erythematous but non-tender.     Data     I have personally reviewed the following data over the past 24 hrs:    8.9  \   10.1 (L)   / 169     152 (H) 116 (H) 21.6 /  315 (H)   3.6 29 0.72 \     ALT: 10 AST: 42 AP: 233 (H) TBILI: 0.4   ALB: 2.0 (L) TOT PROTEIN: 6.8 LIPASE: N/A       Imaging results reviewed over the past 24 hrs:   Recent Results (from the " past 24 hour(s))   XR Chest Port 1 View    Narrative    EXAM: XR CHEST PORT 1 VIEW  LOCATION: [facility]  DATE: 8/5/2023    INDICATION: increasing oxygen needs  COMPARISON: 07/20/2023      Impression    IMPRESSION: Nearly opacified right hemithorax, suggestive of a large partially loculated right effusion. Left lung clear.   XR Thoracic Lumbar Standing 2 Views    Narrative    EXAM: XR THORACIC LUMBAR STANDING 2 VIEWS  LOCATION: [facility]  DATE: 8/5/2023    INDICATION: T11 fracture.  COMPARISON: Lumbar spine plain films 8/3/2023, lumbar spine MRI 8/2/2023 and chest CT 7/31/2023.      Impression    IMPRESSION: Five lumbar-type vertebral bodies. Solid appearing L3-L4, L4-L5 and L5-S1 anterior fusions. Partially visualized T11 through lumbosacral posterior instrumented fusion. 50% compression of the T11 superior endplate correlating with the fracture   noted on the prior exams. Unchanged chronic 20% L2 compression.    Widening of the T10-T11 interspace noted on the comparison supine exams reduces with weightbearing and there is interval development of an associated 15-degree segmental kyphosis. No coronal posttraumatic malalignment.    No change compared to 8/3/2023. The moderate right pleural effusion is more pronounced than on the prior CT.

## 2023-08-06 NOTE — PROGRESS NOTES
Care Management Chart Review    Length of Stay (days): 9    Expected Discharge Date: 08/07/2023 pending response to treatment     Concerns to be Addressed:   Oxygen at 4 liters; IV Ancef;   Patient plan of care discussed at interdisciplinary rounds: Yes    Anticipated Discharge Disposition:  Long term care.      Anticipated Discharge Services:  Total assist.  Anticipated Discharge DME:  To be determined.     Patient/family educated on Medicare website which has current facility and service quality ratings:   NA  Education Provided on the Discharge Plan:   Per team  Patient/Family in Agreement with the Plan:   Yes    Referrals Placed by CM/SW:  [facility]   Private pay costs discussed: Not applicable at this time.    Additional Information:  Patient is a resident of [facility] long term care. On 8/5/2023, writer spoke with nursing at [facility] who stated patient is not oxygen dependent at baseline. Anticipate will discharge via medical transportation.   2:59 PM:  Met with patient and her sisters at the bedside; confirmed their names and contact information, added to chart. They would like to speak with MD to get an update on how patient is doing, discharge milestones and clarify diet (on diabetic diet usually). Text page sent to MD.     Sindy Wade: [phone]  Annelise Haider: [phone]  Mamie Chi: [phone]  Elaine Chi: [phone]    Cyndie Lee RN

## 2023-08-06 NOTE — PROGRESS NOTES
Neurosurgery Progress Note:       A/P: Ginna Mireles is a 67 year old female who presented on 7/28 with sepsis likely unrelated to small amount of dorsal epidural fluid collection in the lower cervical and proximal thoracic area. Mild stenosis a result. No surgical intervention recommended at this time. Could consider additional imaging of C spine and L spine patient declined earlier in the week. BC negative. ID following     Secondary issue on imaging is widening of T11-T12 disc space in the setting of loose screw at T11 from prior fusion as well as fracture at T11 (known during office visit with [facility] in April). From colleague note from Friday     Discussed with Quique LANDRY with [facility] plans to fit patient with custom TLSO and follow up outpatient- patient has appointment on August 17th with Dr. Zavala - if needed could transfer to [place] for further surgical needs but would need to be cleared by ID prior to pursuing any surgical intervention     New XR 8/5/2023 reviewed with custom TLSO. Essential patient wear her back brace. Discussed with medicine team who states patient declines wearing the brace. On my exam, patient states she will wear the brace. Patient does not seem reliable historian. Question decision making capacity. She does today report a fall recently at her care facility. Tells me she should horacio the care facility. She is unable to tell me how long her legs have felt weak. New XR 8/5/2023 - Widening of the T10-T11 interspace noted on the comparison supine exams reduces with weightbearing and there is interval development of an associated 15-degree segmental kyphosis. No coronal posttraumatic malalignment.      Plan:  1. Must wear brace when upright greater than 30 degrees and or out of bed  2. Consider transfer to [place] if back pain limits activity   3. CT ordered lumbar and throacic - MRI lumbar unable to evaluate due to hardware - Lower extremity weakness pain?, effort? Deconditioned?  "Unclear baseline    ADDENDUM: CT and MRI reviewed. 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 No definitive cause for leg weakness on spine imaging. Monitor for now. Should be working with PT. PT note from earlier in the week states patient bed bound at TCU at baseline? Sister has been a challenge to reach per nursing staff. Limited history known of baseline mobility.      Neurosurgery Attending: Dr Bhandari      HPI: MRI finding's reviewed. Sepsis likely unrelated to small amount of dorsal epidural fluid collection in the lower cervical and proximal thoracic area. Mild stenosis a result. BC negative yesterday. No surgical intervention recommended at this time. Could consider additional imaging of C spine and L spine. Secondary issue on imaging is widening of T11-T12 disc space in the setting of loose screw at T11 from prior fusion as well as fracture at T11 (known during office visit with [facility] in April).      S:   Back pain present. Legs hurt bilaterally.      O:  PHYSICAL EXAM:   /52 (BP Location: Left arm)   Pulse 88   Temp 98.7  F (37.1  C) (Oral)   Resp 18   Ht 1.651 m (5' 5\")   Wt 107.3 kg (236 lb 8.9 oz)   SpO2 97%   BMI 39.36 kg/m         Mental Status: lethargic but more alert than prior days examined. Affect is flat.      Motor: diminished bulk and tone all muscle groups of upper and lower extremities.     Strength: Limited exam, effort and pain, does slightly wiggle toes bilaterally   Screams when passively touch lower extremities   Strength seems pain limiting as well as deconditioned      Sensory: Sensation intact bilaterally to light touch. Hypersensitive     Skin: ecchymosis left toes, top of foot; right forearm      Images reviewed personally     Faith Corrales, MYESHA-CNP  [facility] Neurosurgery  O: [phone]    "

## 2023-08-06 NOTE — PROGRESS NOTES
XR reviewed. See below. See prior notes with documentation re: conversation with [facility]. Chart reviewed with noted recommendations for palliative care consult. Depending on course of hospital stay, could consider transfer to [place] for [facility] in regards to widening disc space which reduces when patient is upright. Likely needs fusion extension. Custom brace to be worn at all times when up. We will follow peripherally.     RUBA Diaz  [facility]surgery  O: [phone]    INDICATION: T11 fracture.  COMPARISON: Lumbar spine plain films 8/3/2023, lumbar spine MRI 8/2/2023 and chest CT 7/31/2023.      XR 8/5/2023                                                            IMPRESSION: Five lumbar-type vertebral bodies. Solid appearing L3-L4, L4-L5 and L5-S1 anterior fusions. Partially visualized T11 through lumbosacral posterior instrumented fusion. 50% compression of the T11 superior endplate correlating with the fracture noted on the prior exams. Unchanged chronic 20% L2 compression.     Widening of the T10-T11 interspace noted on the comparison supine exams reduces with weightbearing and there is interval development of an associated 15-degree segmental kyphosis. No coronal posttraumatic malalignment.

## 2023-08-06 NOTE — PLAN OF CARE
"  Problem: Plan of Care - These are the overarching goals to be used throughout the patient stay.    Goal: Plan of Care Review  Description: The Plan of Care Review/Shift note should be completed every shift.  The Outcome Evaluation is a brief statement about your assessment that the patient is improving, declining, or no change.  This information will be displayed automatically on your shift note.  Outcome: Progressing  Goal: Patient-Specific Goal (Individualized)  Description: You can add care plan individualizations to a care plan. Examples of Individualization might be:  \"Parent requests to be called daily at 9am for status\", \"I have a hard time hearing out of my right ear\", or \"Do not touch me to wake me up as it startles me\".  Outcome: Progressing  Goal: Absence of Hospital-Acquired Illness or Injury  Outcome: Progressing  Intervention: Identify and Manage Fall Risk  Recent Flowsheet Documentation  Taken 8/5/2023 1805 by Charlene Bledsoe RN  Safety Promotion/Fall Prevention: activity supervised  Goal: Optimal Comfort and Wellbeing  Outcome: Progressing  Goal: Readiness for Transition of Care  Outcome: Progressing     Problem: Infection  Goal: Absence of Infection Signs and Symptoms  Outcome: Progressing  Intervention: Prevent or Manage Infection  Recent Flowsheet Documentation  Taken 8/5/2023 1805 by Charlene Bledsoe RN  Isolation Precautions: contact precautions maintained     Problem: Oral Intake Inadequate  Goal: Improved Oral Intake  Outcome: Progressing     Problem: Gas Exchange Impaired  Goal: Optimal Gas Exchange  Outcome: Progressing   Goal Outcome Evaluation:         Pt is alert and oriented with intermittent confusion. Pt TLSO brace was put on and get an X-ray. Pt refused to wear it while she is on the bed and stating that its suffocating her. Pt page house officer and get an order to put on the brace if the pt is out of the bed or is the bed is greater than 30 degree. Writer explained to her " and removed the TLSO and put the bed flat. Pt BS was 368 at HS, writer paged house officer and get and order to rechecked after one hr. Writer rechecked after one hour and be come 378 and let the house officer know the result. Writer got an order to give 10 unit one time.

## 2023-08-07 NOTE — PLAN OF CARE
"  Problem: Plan of Care - These are the overarching goals to be used throughout the patient stay.    Goal: Plan of Care Review  Description: The Plan of Care Review/Shift note should be completed every shift.  The Outcome Evaluation is a brief statement about your assessment that the patient is improving, declining, or no change.  This information will be displayed automatically on your shift note.  Outcome: Progressing  Goal: Patient-Specific Goal (Individualized)  Description: You can add care plan individualizations to a care plan. Examples of Individualization might be:  \"Parent requests to be called daily at 9am for status\", \"I have a hard time hearing out of my right ear\", or \"Do not touch me to wake me up as it startles me\".  Outcome: Progressing  Goal: Absence of Hospital-Acquired Illness or Injury  Outcome: Progressing  Intervention: Identify and Manage Fall Risk  Recent Flowsheet Documentation  Taken 8/6/2023 1800 by Charlene Bledsoe RN  Safety Promotion/Fall Prevention: activity supervised  Goal: Optimal Comfort and Wellbeing  Outcome: Progressing  Goal: Readiness for Transition of Care  Outcome: Progressing     Problem: Infection  Goal: Absence of Infection Signs and Symptoms  Outcome: Progressing  Intervention: Prevent or Manage Infection  Recent Flowsheet Documentation  Taken 8/6/2023 1800 by Charlene Bledsoe RN  Isolation Precautions: contact precautions maintained     Problem: Oral Intake Inadequate  Goal: Improved Oral Intake  Outcome: Progressing     Problem: Gas Exchange Impaired  Goal: Optimal Gas Exchange  Outcome: Progressing   Goal Outcome Evaluation:       Pt is alert and intermittent confusion, refused to wear the TLSO, but agree to put the bed flat. Pt AC blood sugar was 393, house officer notified after coverage was given and aware. HS blood sugar was 295, correction dose and Lantus was given at bed time. Pt complain of pain 9/10, PRN 10 mg of oxycodone was administered and pt state " that intervention was effective. 5L of oxygen nasal canula with sat of 92 and will continue to monitor.

## 2023-08-07 NOTE — PLAN OF CARE
Goal Outcome Evaluation:                        Problem: Plan of Care - These are the overarching goals to be used throughout the patient stay.    Goal: Plan of Care Review  Description: The Plan of Care Review/Shift note should be completed every shift.  The Outcome Evaluation is a brief statement about your assessment that the patient is improving, declining, or no change.  This information will be displayed automatically on your shift note.  Outcome: Progressing     Problem: Plan of Care - These are the overarching goals to be used throughout the patient stay.    Goal: Optimal Comfort and Wellbeing  Outcome: Progressing     Problem: Infection  Goal: Absence of Infection Signs and Symptoms  Intervention: Prevent or Manage Infection  Recent Flowsheet Documentation  Taken 8/6/2023 2344 by Zena Charles RN  Isolation Precautions: contact precautions maintained     Problem: Gas Exchange Impaired  Goal: Optimal Gas Exchange  Outcome: Progressing     Problem: Pain Acute  Goal: Optimal Pain Control and Function  Intervention: Prevent or Manage Pain  Recent Flowsheet Documentation  Taken 8/6/2023 2344 by Zena Charles, RN  Medication Review/Management: medications reviewed   Pt is alert to self and place but disoriented with time, date and situation. Received pt to be comfortable and seeping, arousable. VSS 92% O2 sat at 5LPM nasal. No sign of distress.BG at 02AM is 231. Refused repositioning. Comfortable and slept well.

## 2023-08-07 NOTE — PROGRESS NOTES
Chart updated with sibling's phone numbers. Was able to contact Ginna's two sisters (Sindy and Annelise). They state that Sindy is Ginna's designated decision maker. They were able to visit the patient yesterday and unfortunately this was not communicated to the on-call physician. In discussing their sister today, they do say that prior to this hospitalization Ginna was decisional. At this time, however, the patient is altered beyond baseline and they do not feel she can make an informed decision. They tell me that Ginna has one adult daughter who she is not in contact with and one adult son with severe mental and physical disabilities.     Sisters Sindy and Annelise would like Ginna to have the thoracentesis. They are agreeable to sedation if required.    Cyndie Roque MD PGY-2  [facility] Res[facility]

## 2023-08-07 NOTE — PROGRESS NOTES
[facility]    Medicine Progress Note - Hospitalist Service    Date of Admission:  7/28/2023    Assessment & Plan   Ginna Mireles is a 67 year old female w/PMHx of T2DM on insulin, liver cirrhosis, HTN, HLD, CAD, COPD, remote EtOH use disorder, bipolar disorder, and chronic pain admitted on 7/28/2023 for lethargy and fevers, found to be septic with MSSA bacteremia and unknown source. LINO completed on 8/2/23 and did not show vegetations. MRI lumbar and thoracic with concern for epidural abscess. ID and Neurosurgery do not feel this requires surgical intervention. Will require prolonged course of outpatient IV antibiotics. Patient noted to have increasing oxygen needs, initially felt to be secondary to fluid overloaded status. Diuresed with some improvement but ongoing need for supplemental oxygen. Chest XR 8/6/23 reveals large partially loculated pleural effusion on the right. Pulmonology consulted and thoracentesis ordered. Patient intermittently refusing interventions. After multiple attempts at reaching family, spoke with them on 8/7/23 at which time they state that in her current state Ginna does not have decisional capacity. They would like to proceed with thoracentesis. Sister Sindy is her designanted decision maker.    MSSA bacteremia  LINO without vegetations   MRI with concern for small epidural abscess  Loculated Pleural Effusion of Right Lung   Acute metabolic encephalopathy   Patient presented with fevers of 103 degrees and AMS. She is usually communicative and uses a walker at baseline; found down in facility. Positive blood culture x 4 days concerning for high grade MSSA bacteremia. TTE performed 7/29 did not show signs of vegetations. MRI lumbar and thoracic did reveal area of fluid collection concerning for possible epidural abscess, but ID and neurosurgery agree that this is not an operative infection. Patient with gradually increasing oxygen needs, chest XR obtained on 8/5/23  showed large loculated pleural effusion on the right. Pulmonology consulted and agree with thoracentesis. Unfortunately, patient initially refusing interventions including thoracentesis. Source control cannot be determined without this intervention. Discussed with patient's sisters who feel patient does not currently have decision making capacity. Will proceed with thoracentesis.    - ID consulted    - LINO completed 8/2/23 and negative for vegetations    - MRI lumbar and thoracic with concerns for small epidural abscess, non-operative    - MRI cervical and head not obtained, patient refused 2/2 pain    - Chest XR shows pleural effusion concerning for worsening pneumonia, patient refusing thoracentesis   - Continue cefazolin 2g Q8H  - Neurosurgery consulted    - Agree with non-operative infection as noted above   - TLSO brace, patient intermittently refusing   - Pulmonology consulted    - Thoracentesis scheduled for 8/8/23    - 2 mg Ativan 30 minutes prior     Increasing Oxygen Needs   Pleural Effusion   Patient initially admitted for sepsis, concern for pneumonia as source as imaging on admission revealed consolidation in the right lower lobe in addition to small right pleural effusion. Has been on broad spectrum abx. Patient with slowly increasing oxygen needs coinciding with aggressive fluids for hypernatremia, see above. Some improvement with diuresis. Chest XR obtained today unfortunately shows large partially loculated pleural effusion on the right.    - Thoracentesis + fluid studies scheduled for 8/8/23    - Pulmonology consult     Decision Making Capacity    Discussed with sisters on 8/7/23. They visited the patient and feel she is not at her baseline mental status and does not have decisional capacity to refuse diagnostic or therapeutic interventions. Reportedly sister Sindy is decision maker.    Hypernatremia  Post-Obstructive Diuresis   Treatment Complicated by Fluid Status   Patient noted to have sodium  levels of 150+ following admission. Felt to most likely be secondary to dry fluid status in the setting of initial urinary retention and significant post-operative diuresis. Hypernatremia improved with aggressive hypotonic maintenance fluids, but fluids discontinued in the setting of worsening edema and shortness of breath. SOB improved with furosemide, however sodium increased to 153. Will hold on further diuresis for now but will have to be thoughtful about continued maintenance fluids in the setting of increasing respiratory distress and newly identified worsening pleural effusion, see below.    - Trend sodium with daily BMP  - HOLD further diuresis given hypernatremia   - HOLD further IVF given worsening respiratory status, see below    - Continue to monitor fluid status     Type II Diabetes   Patient with known diagnosis of type II diabetes. She did have an AGMA on admission, but felt to be more likely 2/2 lactic acidosis. Home diabetes regimen is as follows: 34 units lantus HS + 13 units aspart at breakfast + 14 units aspart at lunch + 34 aspart at dinner + metformin + bydureon + canagliflozin. Hemoglobin A1c on this admission is 8.2. Sugars have continued to be difficult to control. Will increase intensity of insulin regimen again today.    - Hold PTA metformin, bydureon, canagliflozin.    - Increased to 42 units lantus HS    - 1:5 carb counting    - Very high resistance sliding scale regimen     RLQ pain   H/o cirrhosis 2/2 EtOH use disorder   Transaminitis, improving   Per chart review, patient has remote h/o EtOH use disorder, likely causing cirrhosis. Patient endorsed pain in RLQ  on admission. CT abdomen also did not show ascites, which is reassuring.   - CMP daily    DK - resolved   Cr 1.34 on admission, likely prerenal in setting of sepsis discussed above. Creatinine normalized.   - Daily CMP     Hypokalemia  - RN replacement protocol    Elevated trops  Trop 37 and 35 on repeat on admission. Likely  "demand ischemia in setting of infection discussed above. EKG on admission w/o acute ischemic changes.     Constipation  - Scheduled miralax daily   - Senna PRN    Chronic Conditions:  HTN   - Continue PTA lisinopril 5mg daily   - Hold PTA hydrochlorothiazide 50mg daily     HLD  - Hold PTA pravastatin    Bipolar disorder    - Continue PTA lamotrigine 200mg PO BID  - Continue PTA olanzapine 2.5mg PO daily   - Hold PTA duloxetine 30mg PO daily.   - Hold PTA mirtazapine 45mg PO daily   - Hold PTA rexulti 4mg PO daily     Chronic pain  - Tylenol 500mg QID PRN  - Continue PTA oxycodone 10mg Q4H PRN  - Hold PTA flexeril 10mg TID PRN  - Hold PTA tizanidine 2mg BID Q6H PRN    Acquired hypothyroidism  - Continue PTA synthroid     Diet: Snacks/Supplements Adult: Glucerna; With Meals  Low Consistent Carb (45 g CHO per Meal) Diet    DVT Prophylaxis: Enoxaparin (Lovenox) SQ  [place] Catheter: PRESENT, indication: Retention  Lines: PRESENT      PICC 08/04/23 Single Lumen Right Basilic IV antibiotic-Site Assessment: WDL    Cardiac Monitoring: None  Code Status: No CPR- Do NOT Intubate      Clinically Significant Risk Factors         # Hypernatremia: Highest Na = 153 mmol/L in last 2 days, will monitor as appropriate   # Hypercalcemia: corrected calcium is >10.1, will monitor as appropriate    # Hypoalbuminemia: Lowest albumin = 2 g/dL at 8/6/2023  5:43 AM, will monitor as appropriate           # Hypertension: Noted on problem list       # DMII: A1C = 8.2 % (Ref range: <5.7 %) within past 6 months   # Obesity: Estimated body mass index is 39.36 kg/m  as calculated from the following:    Height as of this encounter: 1.651 m (5' 5\").    Weight as of this encounter: 107.3 kg (236 lb 8.9 oz).             The patient's care was discussed with Dr Sebas Roque MD PGY-2  [facility]    ______________________________________________________________________    Interval History   Patient again scheduled for " thoracentesis today but refused. Was able to connect with the patients sisters, who agree that the patient is not currently at her baseline mental status and not decisional. State that they agree the patient should undergo thoracentesis. Otherwise patient appears stable, her back continues to hurt but otherwise appears at her respiratory baseline.     Physical Exam   Vital Signs: Temp: 98.4  F (36.9  C) Temp src: Oral BP: 118/56 Pulse: 109   Resp: 20 SpO2: 92 % O2 Device: Nasal cannula Oxygen Delivery: 4 LPM  Weight: 236 lbs 8.86 oz  GENERAL: Lethergic but arouses to loud voice. Answers in short sentences.   EYES: Eyes grossly normal to inspection.    RESP:  Difficult pulmonary exam 2/2 body habitus and inability to reposition. Shallow breaths with occasional tachypnea. On 4L NC.   CV: Systolic murmur heard over right sternal border, radiated to clavicle. No murmur heard of mitral position. Regular rate.   Abdomen: Soft, mild tenderness to palpation in epigastric region.   MSK: Bilateral upper extremities with edema, likely 2/2 maintenance fluids.   SKIN: Dorsal surface of left foot with atypical ecchymosis across toes 2-4, concerning for ossler nodes. Tender to touch. Right foot diffusely tender. Bilateral palmar surfaces of hands diffusely erythematous but non-tender.     Data     I have personally reviewed the following data over the past 24 hrs:    7.5  \   10.1 (L)   / 172     153 (H) 116 (H) 25.1 (H) /  219 (H)   4.3; 4.4 29 0.75 \     ALT: 13 AST: 39 AP: 247 (H) TBILI: 0.4   ALB: 2.2 (L) TOT PROTEIN: 6.8 LIPASE: N/A       Imaging results reviewed over the past 24 hrs:   No results found for this or any previous visit (from the past 24 hour(s)).

## 2023-08-07 NOTE — PLAN OF CARE
Problem: Plan of Care - These are the overarching goals to be used throughout the patient stay.    Goal: Plan of Care Review  Description: The Plan of Care Review/Shift note should be completed every shift.  The Outcome Evaluation is a brief statement about your assessment that the patient is improving, declining, or no change.  This information will be displayed automatically on your shift note.  Outcome: Progressing  Flowsheets (Taken 8/7/2023 1058)  Plan of Care Reviewed With: patient     Problem: Plan of Care - These are the overarching goals to be used throughout the patient stay.    Goal: Absence of Hospital-Acquired Illness or Injury  Intervention: Prevent Skin Injury  Recent Flowsheet Documentation  Taken 8/7/2023 0815 by Ayleen Bird RN  Body Position: position maintained     Problem: Oral Intake Inadequate  Goal: Improved Oral Intake  Outcome: Progressing-oral in take is good - continue to monitor blood sugars   Goal Outcome Evaluation:      Plan of Care Reviewed With: patient  Patient refusing thoracentesis today again

## 2023-08-07 NOTE — PROGRESS NOTES
Pulmonary Update:    I am awaiting thoracentesis results. Could be transudative related to her cirrhosis but given the MSSA bacteremia and loculated appearance I would like to rule out empyema.   Primary team attempting to sort out decision making capacity.    I will continue to follow and await study results. Reassuring that blood cultures have cleared. Thoracentesis indicated in attempt to prevent trapped lung.     Chrissie Westbrook MD

## 2023-08-07 NOTE — PROGRESS NOTES
"[facility] Progress Note    SUBJECTIVE:  sleeping on nasal oxygen.  I did not think necessary to wake pt today.  Appreciate Dr. Westbrook seeing her over weekend, notes reviewed.  To have lung tap in a few minutes here.           REVIEW OF SYSTEMS:  Negative unless as listed above.  Social history, Family history, Medications: reviewed.    OBJECTIVE:  /56 (BP Location: Left arm)   Pulse 104   Temp 99.1  F (37.3  C) (Oral)   Resp 20   Ht 1.651 m (5' 5\")   Wt 107.3 kg (236 lb 8.9 oz)   SpO2 92%   BMI 39.36 kg/m                PHYSICAL EXAM:  asleep    Antibiotics:ancef    Pertinent labs:  Lab Results   Component Value Date    WBC 9.1 08/02/2023     Lab Results   Component Value Date    RBC 4.22 08/02/2023     Lab Results   Component Value Date    HGB 12.3 08/02/2023     Lab Results   Component Value Date    HCT 38.5 08/02/2023     No components found for: MCT  Lab Results   Component Value Date    MCV 91 08/02/2023     Lab Results   Component Value Date    MCH 29.1 08/02/2023     Lab Results   Component Value Date    MCHC 31.9 08/02/2023     Lab Results   Component Value Date    RDW 17.0 08/02/2023     Lab Results   Component Value Date     08/02/2023       Last Comprehensive Metabolic Panel:  Sodium   Date Value Ref Range Status   08/07/2023 153 (H) 136 - 145 mmol/L Final     Potassium   Date Value Ref Range Status   08/07/2023 4.3 3.4 - 5.3 mmol/L Final   08/07/2023 4.4 3.4 - 5.3 mmol/L Final     Chloride   Date Value Ref Range Status   08/07/2023 116 (H) 98 - 107 mmol/L Final     Carbon Dioxide (CO2)   Date Value Ref Range Status   08/07/2023 29 22 - 29 mmol/L Final     Anion Gap   Date Value Ref Range Status   08/07/2023 8 7 - 15 mmol/L Final     Glucose   Date Value Ref Range Status   08/07/2023 243 (H) 70 - 99 mg/dL Final     GLUCOSE BY METER POCT   Date Value Ref Range Status   08/07/2023 231 (H) 70 - 99 mg/dL Final     Urea Nitrogen   Date Value Ref Range Status   08/07/2023 " 25.1 (H) 8.0 - 23.0 mg/dL Final     Creatinine   Date Value Ref Range Status   08/07/2023 0.75 0.51 - 0.95 mg/dL Final     GFR Estimate   Date Value Ref Range Status   08/07/2023 87 >60 mL/min/1.73m2 Final     Calcium   Date Value Ref Range Status   08/07/2023 8.1 (L) 8.8 - 10.2 mg/dL Final       Liver Function Studies -   Recent Labs   Lab Test 08/02/23  0541   PROTTOTAL 6.8   ALBUMIN 2.3*   BILITOTAL 0.6   ALKPHOS 176*   AST 41   ALT 17       Erythrocyte Sedimentation Rate   Date Value Ref Range Status   07/03/2019 47 (H) 0 - 20 mm/hr Final       CRP   Date Value Ref Range Status   07/03/2019 2.4 (H) 0.0 - 0.8 mg/dL Final               MICROBIOLOGY DATA:    Aug 1 micro to date neg, prior with 6 consecutive + BC    IMAGING/RADIOLOGY:    IMPRESSION:  THORACIC SPINE MRI:  1.  Sterility indeterminate dorsal epidural fluid collection in the lower cervical/proximal thoracic spine could reflect abscess in the setting of infection. This results in dorsal cord flattening and mild canal stenosis in the proximal thoracic spine.  2.  Chronic T11 superior endplate deformity with increased widening of the T10-T11 intervertebral space since 04/05/2023. This could be related to chronic motion/junctional arthropathy at this level and as widening could reflect instability neurosurgical   consultation is recommended. Infection at this level is felt less as there is relative paucity of adjacent marrow edema and prevertebral soft tissue swelling.  3.  The thoracic thecal sac appears diffusely small with diffuse epidural collection.     LUMBAR SPINE MRI:  1.  The lumbar spine is not well assessed due to artifact.      ASSESSMENT:  MSSA bacteremia hi grade  Back pain  Clean LINO  MRI above    RECOMMENDATION:  Blood cleared  PICC in  Abx to about sept 20 or so  I put in orders    She is set up from ID standpoint to go.  Will not actively see daily.  thanks    HI Enriquez MD  Office [phone] option 2 to desk staff

## 2023-08-07 NOTE — PROGRESS NOTES
"CLINICAL NUTRITION SERVICES - ASSESSMENT NOTE     Nutrition Prescription    RECOMMENDATIONS FOR MDs/PROVIDERS TO ORDER:  None    Malnutrition Status:    Moderate in acute illness    Recommendations already ordered by Registered Dietitian (RD):  Discontinue magic cup to reduce CHO intake  Increase glucerna to bid    Future/Additional Recommendations:  Adjust supplements pending intake, weight, tolerance, labs, Bg     EVALUATION OF PROGRESS TOWARDS GOALS    CURRENT NUTRITION ORDERS  Diet: Low Consistent Carbohydrate  Supplement: magic cup daily, glucerna daily  Intake/Tolerance:Variable, fair to good, improved. %  Usually eating >/= 50%  Estimate intake 1843-5240 kcal, 53-82 g protein/day. Intake of 1 glucerna entered.     Other supplement intake not documented    Meeting >/= 75% of estimated needs past 2 days, improved    Receiving NS iVF At 30 ml/hr since 8/2    Pt is lethargic. She reports her meals are going well and likes the supplements.     LABS  Na 153 (H) increased, no change x 3 days  Alk phos 247 (H), increased  BUN 25 (H), increased  -255mg/dl past  24 hours, in fair, improved control - CDE providing recommendations- lantus increased yesterday    MEDICATIONS  Iv abx, ssi, lantus daily(increased yesterday), novolog 1u: 4 g cho (last increased 8/5), levothyroxine, mvi with minerals, protonix, miralax daily    ANTHROPOMETRICS  Height: 165.1 cm (5' 5\")  Most Recent Weight: 107.3 kg (236 lb 8.9 oz) 8/4, up 29 lb x 5 days in fluid?   IBW: 56.8 kg  BMI: Obesity Grade II BMI 35-39.9  Weight History:     Weight Method     08/04/23 0839 107.3 kg (236 lb 8.9 oz) Bed scale   07/31/23 0452 94.3 kg (207 lb 14.3 oz) Bed scale   07/30/23 0616 93 kg (205 lb 0.4 oz) Bed scale   07/29/23 0421 96.1 kg (211 lb 13.8 oz) Bed scale     From [facility] Everywhere:  90.7 kg (200 lb) 04/05/2023     90.3 kg (199 lb) 01/20/2023 1:52 PM      Dosing Weight: 66.6 kg (adj wt)    ASSESSED NUTRITION NEEDS    Estimated Energy Needs: " 4290-8748 kcals/day (25 - 30 kcals/kg)  Justification: Obese  Estimated Protein Needs: 79-99 grams protein/day (1.2-1.5 grams of pro/kg)  Justification: repletion, hypercatabolism with acute illness  Estimated Fluid Needs: 0207-7395 mL/day (1 mL/kcal)   Justification: Maintenance    GI  Last BM 8/4    MALNUTRITION:  % Weight Loss:  None noted- fluid weight up?   % Intake:  <50%>/= 5 days  Subcutaneous Fat Loss:  None observed  Muscle Loss:  Temporal region mild depletion  Fluid Retention:  None noted    Malnutrition Diagnosis: Moderate malnutrition in the context of acute illness     NUTRITION DIAGNOSIS  Inadequate oral intake related to poor appetite, acute illness as evidenced by po intake, < 50% of meals. - improving     INTERVENTIONS  Implementation  Discontinue magic cup to help reduce CHO intake  Increase Glucerna to bid       Goals  Nutrition intake > 75% of estimated nutrition needs- met  Glucose < 180- not progressing     Monitoring/Evaluation  Intake, weight, labs, stooling,  BG

## 2023-08-07 NOTE — PROGRESS NOTES
"Neurosurgery Progress Note: 8/7/2023       A/P: Ginna Mireles is a 67 year old female who presented on 7/28 with sepsis likely unrelated to small amount of dorsal epidural fluid collection in the lower cervical and proximal thoracic area. Mild stenosis a result. No surgical intervention recommended at this time. Could consider additional imaging of C spine and L spine patient declined earlier in the week. BC negative. ID following     Secondary issue on imaging is widening of T11-T12 disc space in the setting of loose screw at T11 from prior fusion as well as fracture at T11 (known during office visit with [facility] in April). Discussed with surgeon team on Friday \"Discussed with Xiimo GRIS with [facility] plans to fit patient with custom TLSO and follow up outpatient- patient has appointment on August 17th with Dr. Zavala - if needed could transfer to [place] for further surgical needs but would need to be cleared by ID prior to pursuing any surgical intervention\"    New XR 8/5/2023 reviewed with custom TLSO. Essential patient wear her back brace. Per RN patient still refusing to wear her brace. Patient does not seem reliable historian. Question decision making capacity.  New XR 8/5/2023 - Widening of the T10-T11 interspace noted on the comparison supine exams reduces with weightbearing and there is interval development of an associated 15-degree segmental kyphosis. No coronal posttraumatic malalignment.     Currently will not answer questions or participate in exam discussed importance of wearing her brace as her back is not stable and needs to the support as previously noted with worsening widening at T10-T11- will likely require surgical intervention once cleared by ID. She states that if surgery is required she would proceed. Will continue to attempt to contact sister.      Plan:  1. Must wear brace when upright greater than 30 degrees and or out of bed  2. Consider transfer to [place] if back pain limits " "activity          Neurosurgery Attending: Dr Bhandari      HPI: MRI finding's reviewed. Sepsis likely unrelated to small amount of dorsal epidural fluid collection in the lower cervical and proximal thoracic area. Mild stenosis a result. BC negative yesterday. No surgical intervention recommended at this time. Could consider additional imaging of C spine and L spine. Secondary issue on imaging is widening of T11-T12 disc space in the setting of loose screw at T11 from prior fusion as well as fracture at T11 (known during office visit with [facility] in April).      S:   Does not participate with history or exam will spontaneously move UEs      O:  PHYSICAL EXAM:   /56 (BP Location: Left arm)   Pulse 104   Temp 99.1  F (37.3  C) (Oral)   Resp 20   Ht 1.651 m (5' 5\")   Wt 107.3 kg (236 lb 8.9 oz)   SpO2 92%   BMI 39.36 kg/m         Mental Status: lethargic but more alert than prior days examined. Affect is flat.      Motor: diminished bulk and tone all muscle groups of upper and lower extremities.     Strength: Limited exam, effort and pain, does slightly wiggle toes bilaterally   Screams when passively touch lower extremities   Strength seems pain limiting as well as deconditioned      Sensory: Sensation intact bilaterally to light touch. Hypersensitive     Skin: ecchymosis left toes, top of foot; right forearm      Images reviewed personally     Elly Ordaz PA-C  [facility] Neurosurgery  O: [phone]    "

## 2023-08-07 NOTE — PROGRESS NOTES
Patient was agreeable for a thoracensis with nursing then taken downstairs to do thoracensis and still refused to have done and brought back to floor.

## 2023-08-07 NOTE — PROGRESS NOTES
Radiology called and updated schedule for Thoracentesis for pt at 1130AM today. Radiology will try to do Thoracentesis if pt will not refuse.

## 2023-08-08 NOTE — CONSULTS
BRIEF IR CONSULT NOTE    67-year-old woman with fever, altered mental status, MSSA bacteremia, difficulty breathing and a large loculated right-sided pleural effusion. Today's 08/08/2023 ultrasound guided right-sided thoracentesis yielded only a small amount of fluid due to numerous septations and loculations. Right-sided chest tube placement requested to facilitate drainage. Imaging reviewed. Arrangements will be made for CT-guided right-sided chest tube placement.

## 2023-08-08 NOTE — PLAN OF CARE
Problem: Plan of Care - These are the overarching goals to be used throughout the patient stay.    Goal: Plan of Care Review  Description: The Plan of Care Review/Shift note should be completed every shift.  The Outcome Evaluation is a brief statement about your assessment that the patient is improving, declining, or no change.  This information will be displayed automatically on your shift note.  Outcome: Progressing     Problem: Plan of Care - These are the overarching goals to be used throughout the patient stay.    Goal: Readiness for Transition of Care  Outcome: Not Progressing     Problem: Plan of Care - These are the overarching goals to be used throughout the patient stay.    Goal: Absence of Hospital-Acquired Illness or Injury  Outcome: Progressing     Problem: Infection  Goal: Absence of Infection Signs and Symptoms  Outcome: Progressing  Intervention: Prevent or Manage Infection  Recent Flowsheet Documentation  Taken 8/7/2023 1607 by Sherron Gutierrez RN  Isolation Precautions: contact precautions maintained     Problem: Oral Intake Inadequate  Goal: Improved Oral Intake  Outcome: Progressing   Goal Outcome Evaluation:       Pt will nod to yes or no questions. Once in while will answer. Pt to have thoracentesis tomorrow. Pt fed dinner. Lovenox on hold for procedure tomorrow. Pt on 5 L oxygen with sats low 90s.

## 2023-08-08 NOTE — PLAN OF CARE
Problem: Plan of Care - These are the overarching goals to be used throughout the patient stay.    Goal: Plan of Care Review  Description: The Plan of Care Review/Shift note should be completed every shift.  The Outcome Evaluation is a brief statement about your assessment that the patient is improving, declining, or no change.  This information will be displayed automatically on your shift note.  Outcome: Progressing  Flowsheets (Taken 8/8/2023 1002)  Plan of Care Reviewed With: patient     Problem: Plan of Care - These are the overarching goals to be used throughout the patient stay.    Goal: Optimal Comfort and Wellbeing  Outcome: Progressing     Problem: Infection  Goal: Absence of Infection Signs and Symptoms  Outcome: Progressing  Intervention: Prevent or Manage Infection  Recent Flowsheet Documentation  Taken 8/8/2023 0805 by Ayleen Bird, RN  Isolation Precautions: contact precautions maintained   Goal Outcome Evaluation:monitoring vitats  Problem: Oral Intake Inadequate  Goal: Improved Oral Intake  Outcome: Progressing-patient is a feeder and eating well  Problem: Gas Exchange Impaired  Goal: Optimal Gas Exchange  Outcome: Progressing-on oxygen 5liters - having thoracentesis today  Intervention: Optimize Oxygenation and Ventilation  Recent Flowsheet Documentation  Taken 8/8/2023 0805 by Ayleen Bird, RN  Head of Bed (HOB) Positioning: HOB at 30 degrees            Plan of Care Reviewed With: patient

## 2023-08-08 NOTE — PROGRESS NOTES
PULMONARY PROGRESS NOTE    Date / Time of Admission:  7/28/2023 12:25 PM  Assessment:   67yoF with MSSA bacteremia, small cervical epidural abscess deemed not necessary for evacuation by neurosurgery, Cirrhosis, bipolar disorder who has now developed large right-sided pleural effusion with worsening hypoxia that is loculated and now s/p chest tube.     Principal Problem:    Community acquired pneumonia of left lower lobe of lung  Active Problems:    Left leg cellulitis    Altered mental status, unspecified altered mental status type    Sepsis, due to unspecified organism, unspecified whether acute organ dysfunction present (H)    DK (acute kidney injury) (H)    Type 2 diabetes mellitus with hyperglycemia, with long-term current use of insulin (H)    Gram-positive bacteremia    Acute kidney failure, unspecified (H)        Plan:   Chest tube placed 8/8 after thoracentesis 75cc removed.   TPA/DNAse ordered to help break up loculations and facilitate effusion drainage.  ABG drawn due to concerning respiratory status but she is not having hypercapneic respiratory failure and I do not think bipap would help right now. Should her status change, this could be considered. Patient is DNR/DNI.      Subjective:   HPI:  Ginna Mireles is a 67 year old female with cirrhosis (?ETOH remote use), bipolar disorder, DMII residing in nursing home with L3-S1 hardware removal and posterior fusion T10-L3 January 2023 presented 7/28 with fever and altered mental status. She was found to have MSSA bacteremia. Her mental status has continued to wax and wane. She underwent LINO that found no vegetations. MRI found indeterminate epidural fluid collection in lower cervical spine. Neurosurgery was consulted and did not feel intervention warranted. Her T11 end-plate deformity had increased since imaging April 2023 (done by [facility]). They were aware of this finding in April. She was fitted with TLSO brace for when out of bed.      She had an  increase in oxygen requirements yesterday and chest Xray shows large right-sided effusion.     After attempted thoracentesis, patient with increasing respiratory distress, abdominal breathing. ABG ok, patient still responsive but increased O2 requirement. I called radiology to expedite chest tube placement which they kindly placed. Only 200cc out from initial placement.         Allergies:   Allergies   Allergen Reactions    Hydroxyzine     Mushroom     Penicillins      Hives      Tamiflu [Oseltamivir]         MEDS:  Scheduled Meds:   alteplase (ACTIVASE) 10 mg, dornase guanaco (PULMOZYME) 5 mg in sodium chloride 0.9 % 50 mL for chest tube instillation in syringe  50 mL Chest Tube BID    ceFAZolin  2 g Intravenous Q8H    [Held by provider] DULoxetine  30 mg Oral Daily    [Held by provider] enoxaparin ANTICOAGULANT  40 mg Subcutaneous Q24H    insulin aspart   Subcutaneous Daily with breakfast    insulin aspart   Subcutaneous Daily with lunch    insulin aspart   Subcutaneous Daily with supper    insulin aspart  1-10 Units Subcutaneous TID AC    insulin aspart  1-7 Units Subcutaneous At Bedtime    insulin glargine  45 Units Subcutaneous At Bedtime    lamoTRIgine  200 mg Oral BID    levothyroxine  137 mcg Oral Daily    lisinopril  2.5 mg Oral Daily    miconazole   Topical BID    multivitamin w/minerals  1 tablet Oral Daily    OLANZapine  2.5 mg Oral At Bedtime    pantoprazole  40 mg Oral QAM AC    polyethylene glycol  17 g Oral Daily    sodium chloride (PF)  3 mL Intracatheter Q8H     Continuous Infusions:   sodium chloride Stopped (08/02/23 1000)     PRN Meds:.acetaminophen, bisacodyl **OR** bisacodyl, glucose **OR** dextrose **OR** glucagon, glucose **OR** dextrose **OR** glucagon, insulin aspart, lidocaine 4%, lidocaine (buffered or not buffered), melatonin, naloxone **OR** naloxone **OR** naloxone **OR** naloxone, ondansetron **OR** ondansetron, oxyCODONE IR, prochlorperazine **OR** prochlorperazine **OR**  "prochlorperazine, sodium chloride (PF), sodium chloride (PF)    Objective:   VITALS:  /51 (BP Location: Left arm)   Pulse 103   Temp 98.8  F (37.1  C) (Oral)   Resp 30   Ht 1.651 m (5' 5\")   Wt 107.3 kg (236 lb 8.9 oz)   SpO2 100%   BMI 39.36 kg/m    EXAM:    GENERAL APPEARANCE: disheveled, uncomfortable appearing.      EYES: EOMI, - PERRL     NECK: spider hemangiomas present     RESP: decreased breath sounds on right. Abdominal recruitment for exhale     CV: regular rates and rhythm, normal S1 S2, no S3 or S4 and no murmur, click or rub -     ABDOMEN: distended but non-tender     MS: pitting LE edema     SKIN: no suspicious lesions or rashes. Significant ecchymosis on arms from Ivs.      NEURO: moving all 4 extremities, responds to her name.      LYMPHATICS: No axillary, cervical, inguinal, or supraclavicular nodes        I&O:    Date 08/06/23 0700 - 08/07/23 0659   Shift 0890-4181 1470-1939 5672-9089 24 Hour Total   INTAKE   I.V. 10   10   Shift Total(mL/kg) 10(0.09)   10(0.09)   OUTPUT   Shift Total(mL/kg)       Weight (kg) 107.3 107.3 107.3 107.3       Data Review:    Imaging: personally reviewed  EXAM: XR CHEST PORT 1 VIEW  LOCATION: [facility]  DATE: 8/8/2023     INDICATION: worsening respiratory distress following thoracentesis  COMPARISON: 08/05/2023                                                                      IMPRESSION: Right-sided PICC line with tip over SVC. Increasing large right pleural effusion with decreased aeration of the right upper lobe. Clear left lung. Stable cardiomediastinal silhouette. Cervical and thoracolumbar fusion hardware.      Results for orders placed or performed during the hospital encounter of 07/28/23   Basic metabolic panel   Result Value Ref Range    Sodium 149 (H) 136 - 145 mmol/L    Potassium 3.7 3.4 - 5.3 mmol/L    Chloride 115 (H) 98 - 107 mmol/L    Carbon Dioxide (CO2) 22 22 - 29 mmol/L    Anion Gap 12 7 - 15 mmol/L    Urea " Nitrogen 20.3 8.0 - 23.0 mg/dL    Creatinine 0.65 0.51 - 0.95 mg/dL    Calcium 10.2 8.8 - 10.2 mg/dL    Glucose 263 (H) 70 - 99 mg/dL    GFR Estimate >90 >60 mL/min/1.73m2     Lab Results   Component Value Date    WBC 9.0 08/08/2023    HGB 10.5 (L) 08/08/2023    HCT 34.6 (L) 08/08/2023    MCV 98 08/08/2023     08/08/2023

## 2023-08-08 NOTE — PROCEDURES
Radiology Procedure Note    Procedure:  US  guided right thoracentesis    Findings:  Only 75 mL of clear yellow fluid could be aspirated secondary to numerous septations and loculations. This fluid was sent to the laboratory.    Complications:  None    EBL:  Minimal

## 2023-08-08 NOTE — PROGRESS NOTES
[facility]    Medicine Progress Note - Hospitalist Service    Date of Admission:  7/28/2023    Assessment & Plan   Ginna Mireles is a 67 year old female w/PMHx of T2DM on insulin, liver cirrhosis, HTN, HLD, CAD, COPD, remote EtOH use disorder, bipolar disorder, and chronic pain admitted on 7/28/2023 for lethargy and fevers, found to be septic with MSSA bacteremia and unknown source. LINO completed on 8/2/23 and did not show vegetations. MRI lumbar and thoracic with concern for epidural abscess. ID and Neurosurgery do not feel this requires surgical intervention. Will require prolonged course of outpatient IV antibiotics. Patient noted to have increasing oxygen needs, initially felt to be secondary to fluid overloaded status. Diuresed with some improvement but ongoing need for supplemental oxygen. Chest XR 8/6/23 reveals large partially loculated pleural effusion on the right. Pulmonology consulted and thoracentesis ordered. Patient intermittently refusing interventions. After multiple attempts at reaching family, spoke with them on 8/7/23 at which time they state that in her current state Ginna does not have decisional capacity. They would like to proceed with thoracentesis. Thoracentesis 8/8/23 with only minimal fluid drained due to numerous septations and loculations. No improvement to respiratory distress following intervention. Will contact family members and attempt to set up family meeting to discuss next steps.     MSSA bacteremia  LINO without vegetations   MRI with concern for small epidural abscess  Loculated Pleural Effusion of Right Lung   Acute metabolic encephalopathy   Patient presented with fevers of 103 degrees and AMS. She is usually communicative and uses a walker at baseline; found down in facility. Positive blood culture x 4 days concerning for high grade MSSA bacteremia. TTE performed 7/29 did not show signs of vegetations. MRI lumbar and thoracic did reveal area of fluid  collection concerning for possible epidural abscess, but ID and neurosurgery agree that this is not an operative infection. Patient with gradually increasing oxygen needs, chest XR obtained on 8/5/23 showed large loculated pleural effusion on the right. Pulmonology consulted and agree with thoracentesis. Unfortunately, patient initially refusing interventions including thoracentesis. Source control cannot be determined without this intervention. Discussed with patient's sisters who feel patient does not currently have decision making capacity. Thoracentesis performed on 8/8/23 with only minimal fluid drained due to numerous loculations. Potential next steps include chest tube, but will need to discuss with family members if this would be desired.    - ID consulted    - LINO completed 8/2/23 and negative for vegetations    - MRI lumbar and thoracic with concerns for small epidural abscess, non-operative    - MRI cervical and head not obtained, patient refused 2/2 pain    - Chest XR shows pleural effusion concerning for worsening pneumonia    - Thoracentesis 8/8/23 with only minimal fluid, will follow studies   - Continue cefazolin 2g Q8H  - Neurosurgery consulted    - Agree with non-operative infection as noted above   - TLSO brace, patient intermittently refusing   - Pulmonology consulted    - Thoracentesis 8/8/23 with only minimal fluid collected    - Chest tube may be indicated, need to have goals of care conversation with family     Increasing Oxygen Needs   Pleural Effusion   Patient initially admitted for sepsis, concern for pneumonia as source as imaging on admission revealed consolidation in the right lower lobe in addition to small right pleural effusion. Has been on broad spectrum abx. Patient with slowly increasing oxygen needs coinciding with aggressive fluids for hypernatremia, see above. Some improvement with diuresis. Chest XR obtained today unfortunately shows large partially loculated pleural effusion  on the right. Thoracentesis on 8/8/23 with only minimal fluid out given extensive loculations.    - Pulmonology consult    - s/p thoracentesis without much clinical relief    - chest tube?     - family care conference     Decision Making Capacity    Code Status  Discussed with sisters on 8/7/23. They visited the patient and feel she is not at her baseline mental status and does not have decisional capacity to refuse diagnostic or therapeutic interventions. Reportedly sister Sindy is decision maker. Would like daily updates. Confirm DNR/DNI status.    - family care conference     Hypernatremia  Post-Obstructive Diuresis   Treatment Complicated by Fluid Status   Patient noted to have sodium levels of 150+ following admission. Felt to most likely be secondary to dry fluid status in the setting of initial urinary retention and significant post-operative diuresis. Hypernatremia improved with aggressive hypotonic maintenance fluids, but fluids discontinued in the setting of worsening edema and shortness of breath. SOB improved with furosemide, however sodium increased to 153. Will hold on further diuresis for now but will have to be thoughtful about continued maintenance fluids in the setting of increasing respiratory distress and newly identified worsening pleural effusion, see below.    - Trend sodium with daily BMP  - HOLD further diuresis given hypernatremia   - HOLD further IVF given worsening respiratory status, see below    - Continue to monitor fluid status     Type II Diabetes   Patient with known diagnosis of type II diabetes. She did have an AGMA on admission, but felt to be more likely 2/2 lactic acidosis. Home diabetes regimen is as follows: 34 units lantus HS + 13 units aspart at breakfast + 14 units aspart at lunch + 34 aspart at dinner + metformin + bydureon + canagliflozin. Hemoglobin A1c on this admission is 8.2. Sugars have continued to be difficult to control. Will increase intensity of insulin regimen  again today.    - Hold PTA metformin, bydureon, canagliflozin.    - 42 units lantus HS    - 1:5 carb counting    - Very high resistance sliding scale regimen     RLQ pain   H/o cirrhosis 2/2 EtOH use disorder   Transaminitis, improving   Per chart review, patient has remote h/o EtOH use disorder, likely causing cirrhosis. Patient endorsed pain in RLQ  on admission. CT abdomen also did not show ascites, which is reassuring.   - CMP daily    DK - resolved   Cr 1.34 on admission, likely prerenal in setting of sepsis discussed above. Creatinine normalized.   - Daily CMP     Hypokalemia  - RN replacement protocol    Elevated trops  Trop 37 and 35 on repeat on admission. Likely demand ischemia in setting of infection discussed above. EKG on admission w/o acute ischemic changes.     Constipation  - Scheduled miralax daily   - Senna PRN    Chronic Conditions:  HTN   - Continue PTA lisinopril 5mg daily   - Hold PTA hydrochlorothiazide 50mg daily     HLD  - Hold PTA pravastatin    Bipolar disorder    - Continue PTA lamotrigine 200mg PO BID  - Continue PTA olanzapine 2.5mg PO daily   - Hold PTA duloxetine 30mg PO daily.   - Hold PTA mirtazapine 45mg PO daily   - Hold PTA rexulti 4mg PO daily     Chronic pain  - Tylenol 500mg QID PRN  - Continue PTA oxycodone 10mg Q4H PRN  - Hold PTA flexeril 10mg TID PRN  - Hold PTA tizanidine 2mg BID Q6H PRN    Acquired hypothyroidism  - Continue PTA synthroid     Diet: Low Consistent Carb (45 g CHO per Meal) Diet  Snacks/Supplements Adult: Glucerna; With Meals    DVT Prophylaxis: Enoxaparin (Lovenox) SQ  [place] Catheter: PRESENT, indication: Retention  Lines: PRESENT      PICC 08/04/23 Single Lumen Right Basilic IV antibiotic-Site Assessment: WDL    Cardiac Monitoring: None  Code Status: No CPR- Do NOT Intubate      Clinically Significant Risk Factors         # Hypernatremia: Highest Na = 153 mmol/L in last 2 days, will monitor as appropriate      # Hypoalbuminemia: Lowest albumin = 2 g/dL  "at 8/8/2023  6:40 AM, will monitor as appropriate           # Hypertension: Noted on problem list       # DMII: A1C = 8.2 % (Ref range: <5.7 %) within past 6 months   # Obesity: Estimated body mass index is 39.36 kg/m  as calculated from the following:    Height as of this encounter: 1.651 m (5' 5\").    Weight as of this encounter: 107.3 kg (236 lb 8.9 oz).             The patient's care was discussed with Dr Sebas Roque MD PGY-2  [facility]    ______________________________________________________________________    Interval History   Patient underwent thoracentesis today. Unfortunately not much fluid could be drained. Patient with little clinical improvement, remains on oxygen supplementation. Not able to answer questions.     Physical Exam   Vital Signs: Temp: 97.6  F (36.4  C) Temp src: Oral BP: 120/54 Pulse: 92   Resp: 17 SpO2: 91 % O2 Device: Nasal cannula Oxygen Delivery: 6 LPM  Weight: 236 lbs 8.86 oz  GENERAL: Lethergic but arouses to loud voice. Answers in one word sentences. Clear increased work of breathing.   EYES: Eyes grossly normal to inspection.    RESP:  Difficult pulmonary exam 2/2 body habitus and inability to reposition. Shallow breaths with occasional tachypnea. On 4L NC. Increased work of breathing   CV: Systolic murmur heard over right sternal border, radiated to clavicle. No murmur heard of mitral position. Regular rate.   Abdomen: Soft, mild tenderness to palpation in epigastric region.   MSK: Bilateral upper extremities with edema, likely 2/2 maintenance fluids.   SKIN: Dorsal surface of left foot with atypical ecchymosis across toes 2-4. Bilateral palmar surfaces of hands diffusely erythematous but non-tender.     Data     I have personally reviewed the following data over the past 24 hrs:    9.0  \   10.5 (L)   / 197     152 (H) 116 (H) 19.6 /  273 (H)   4.1 29 0.66 \     ALT: 7 AST: 34 AP: 272 (H) TBILI: 0.5   ALB: 2.0 (L) TOT PROTEIN: 7.1 LIPASE: " N/A       Imaging results reviewed over the past 24 hrs:   Recent Results (from the past 24 hour(s))   US Thoracentesis    Narrative    EXAM:   1. RIGHT THORACENTESIS  2. ULTRASOUND GUIDANCE  LOCATION: [facility]  DATE: 8/8/2023    INDICATION: Loculated right pleural effusion.    PROCEDURE: Informed consent obtained from the patient's sister. Time out performed. The chest was prepped and draped in sterile fashion. 10 mL of 1 % lidocaine was infused into the local soft tissues. Under direct ultrasound guidance, a 5 [place] catheter   system was placed into the pleural effusion.     Only 75 mL of clear yellow fluid could be aspirated secondary to numerous septations and loculations. This fluid was sent to the laboratory.    Patient tolerated procedure well.    Ultrasound imaging was obtained and placed in the patient's permanent medical record.      Impression    IMPRESSION:  1. Status post right ultrasound-guided thoracentesis.  2. Only 75 mL of clear yellow fluid could be aspirated secondary to numerous septations and loculations.     Reference CPT Code: 75511   XR Chest Port 1 View    Narrative    EXAM: XR CHEST PORT 1 VIEW  LOCATION: [facility]  DATE: 8/8/2023    INDICATION: worsening respiratory distress following thoracentesis  COMPARISON: 08/05/2023      Impression    IMPRESSION: Right-sided PICC line with tip over SVC. Increasing large right pleural effusion with decreased aeration of the right upper lobe. Clear left lung. Stable cardiomediastinal silhouette. Cervical and thoracolumbar fusion hardware.

## 2023-08-08 NOTE — PROGRESS NOTES
Patient back at 1030 from thoracentesis which only got 75ml out.  Patient seemed to be gasping for air - increased oxygen to 5 liters was running at 91% for a short time - then dropping to 83%.  Increased oxygen to 6liters at 1300 and now running at 91 to 92%.  Notified Dr. Roque - will continue to monitor  1350 patient is mouth breather and still O2 sats dropping between 83-89- put her on oxymask and she is able to get up to 92% with 6 liters.  She does not like mask on but breathing much better with on, does not seem to be struggling as much.  Will monitor

## 2023-08-08 NOTE — PROCEDURES
[facility]    Procedure: Right chest tube placement    Date/Time: 8/8/2023 4:00 PM    Performed by: Domo Marx MD  Authorized by: Domo Marx MD      UNIVERSAL PROTOCOL   Site Marked: Yes  Prior Images Obtained and Reviewed:  Yes  Required items: Required blood products, implants, devices and special equipment available    Patient identity confirmed:  Verbally with patient, arm band and provided demographic data  NA - No sedation, light sedation, or local anesthesia  Confirmation Checklist:  Patient's identity using two indicators, relevant allergies, procedure was appropriate and matched the consent or emergent situation and correct equipment/implants were available  Time out: Immediately prior to the procedure a time out was called    Universal Protocol: the Joint Commission Universal Protocol was followed    Preparation: Patient was prepped and draped in usual sterile fashion    ESBL (mL):  0     ANESTHESIA    Anesthesia:  Local infiltration  Local Anesthetic:  Lidocaine 1% without epinephrine  Anesthetic Total (mL):  10      SEDATION    Patient Sedated: No    See dictated procedure note for full details.    PROCEDURE  Describe Procedure: Successful placement of 12 Fr chest tube in the right pleural space.  Effusion was more moderate in the supine position.  Return on 10-15 ml serosanginous fluid.       Chest tube to low suction.  See orders for clamping if high output.  Patient Tolerance:  Patient tolerated the procedure well with no immediate complications  Length of time physician/provider present for 1:1 monitoring during sedation: 0 (NA)

## 2023-08-08 NOTE — PLAN OF CARE
"  Problem: Plan of Care - These are the overarching goals to be used throughout the patient stay.    Goal: Plan of Care Review  Description: The Plan of Care Review/Shift note should be completed every shift.  The Outcome Evaluation is a brief statement about your assessment that the patient is improving, declining, or no change.  This information will be displayed automatically on your shift note.  Outcome: Progressing  Goal: Patient-Specific Goal (Individualized)  Description: You can add care plan individualizations to a care plan. Examples of Individualization might be:  \"Parent requests to be called daily at 9am for status\", \"I have a hard time hearing out of my right ear\", or \"Do not touch me to wake me up as it startles me\".  Outcome: Progressing  Goal: Absence of Hospital-Acquired Illness or Injury  Outcome: Progressing  Intervention: Identify and Manage Fall Risk  Recent Flowsheet Documentation  Taken 8/8/2023 0300 by Filomena Lazar RN  Safety Promotion/Fall Prevention:   assistive device/personal items within reach   activity supervised  Intervention: Prevent Skin Injury  Recent Flowsheet Documentation  Taken 8/8/2023 0300 by Filomena Lazar RN  Body Position: position maintained  Goal: Optimal Comfort and Wellbeing  Outcome: Progressing  Goal: Readiness for Transition of Care  Outcome: Progressing     Problem: Infection  Goal: Absence of Infection Signs and Symptoms  Outcome: Progressing  Intervention: Prevent or Manage Infection  Recent Flowsheet Documentation  Taken 8/8/2023 0300 by Filomena Lazar RN  Isolation Precautions: contact precautions maintained     Problem: Oral Intake Inadequate  Goal: Improved Oral Intake  Outcome: Progressing     Problem: Gas Exchange Impaired  Goal: Optimal Gas Exchange  Outcome: Progressing  Intervention: Optimize Oxygenation and Ventilation  Recent Flowsheet Documentation  Taken 8/8/2023 0300 by Filomena Lazar RN  Head of Bed (HOB) Positioning: HOB at 20-30 degrees   Goal " Outcome Evaluation:  Pt slept well overnight, awake with cares. Pt has been npo overnight due to possible procedure this am.

## 2023-08-08 NOTE — PROGRESS NOTES
Care Management Follow Up    Length of Stay (days): 11    Expected Discharge Date: 08/10/2023     Concerns to be Addressed:       Patient plan of care discussed at interdisciplinary rounds: Yes    Anticipated Discharge Disposition:  return to [facility]      Anticipated Discharge Services:    Anticipated Discharge DME:      Patient/family educated on Medicare website which has current facility and service quality ratings:    Education Provided on the Discharge Plan:    Patient/Family in Agreement with the Plan:      Referrals Placed by CM/SW:    Private pay costs discussed: Not applicable    Additional Information:  Chart reviewed. Plan to return to [facility] when medically ready     Message left for admissions at [facility] to notify that patient is not medically ready for discharge     2:41 PM  Dr Roque requested  set up meeting with sisters on Thursday morning. Called sister Sindy. She and Annelise can be here at 1100 on Thursday. Dr Roque updated     Cyndie Grullon RN

## 2023-08-08 NOTE — PROGRESS NOTES
Interventional Radiology - Pre-Procedure Note:  Inpatient - [facility]  08/08/2023     Procedure Requested: R chest tube placement  Requested by: Dr Roque    History and Physical Reviewed: H&P documented within 30 days (by Booker Olson MD on 7/28/23).  I have personally reviewed the patient's medical history and have updated the medical record as necessary.    Brief HPI: Ginna Mireles is a 67 year old female Ginna Mireles is a 67 year old female w/PMHx of T2DM on insulin, liver cirrhosis, HTN, HLD, CAD, COPD, remote EtOH use disorder, bipolar disorder, and chronic pain admitted on 7/28/2023 for lethargy and fevers, found to be septic with MSSA bacteremia and unknown source. LINO completed on 8/2/23 and did not show vegetations. MRI lumbar and thoracic with concern for epidural abscess. ID and Neurosurgery do not feel this requires surgical intervention. Will require prolonged course of outpatient IV antibiotics. Patient noted to have increasing oxygen needs, initially felt to be secondary to fluid overloaded status. Diuresed with some improvement but ongoing need for supplemental oxygen. Chest XR 8/6/23 reveals large partially loculated pleural effusion on the right. Pulmonology consulted and thoracentesis ordered.  US thoracentesis resulted in 75cc. Post procedure enlarging right pleural effusion noted. IR consulted for R CT placement.     IMAGING:  EXAM: XR CHEST PORT 1 VIEW  LOCATION: [facility]  DATE: 8/8/2023     INDICATION: worsening respiratory distress following thoracentesis  COMPARISON: 08/05/2023                                                                      IMPRESSION: Right-sided PICC line with tip over SVC. Increasing large right pleural effusion with decreased aeration of the right upper lobe. Clear left lung. Stable cardiomediastinal silhouette. Cervical and thoracolumbar fusion hardware.    NPO: NA  ANTICOAGULANTS: none  ANTIBIOTICS: Not  "indicated    ALLERGIES  Allergies   Allergen Reactions    Hydroxyzine     Mushroom     Penicillins      Hives      Tamiflu [Oseltamivir]          LABS:  INR   Date Value Ref Range Status   07/28/2023 1.69 (H) 0.85 - 1.15 Final      Hemoglobin   Date Value Ref Range Status   08/08/2023 10.5 (L) 11.7 - 15.7 g/dL Final     Platelet Count   Date Value Ref Range Status   08/08/2023 197 150 - 450 10e3/uL Final     Creatinine   Date Value Ref Range Status   08/08/2023 0.66 0.51 - 0.95 mg/dL Final     Potassium   Date Value Ref Range Status   08/08/2023 4.1 3.4 - 5.3 mmol/L Final         EXAM:  /54 (BP Location: Left arm)   Pulse 92   Temp 97.6  F (36.4  C) (Oral)   Resp 17   Ht 1.651 m (5' 5\")   Wt 107.3 kg (236 lb 8.9 oz)   SpO2 92%   BMI 39.36 kg/m    General:  resp  distress.    Neuro:  lethargic minimally responsive  Resp:  Lungs decreased to auscultation bilaterally.91% 6L mask  Cardio:  S1S2 and reg, without murmur, clicks or rubs  Abdomen:  Soft, non-distended.  Skin:  Without excoriations, ecchymosis, erythema, lesions or open sores on right side chest.  MSK:  No gross motor weakness.      Pre-Sedation Assessment:  Fentanyl only.  Code Status: Full Code intra procedure, per discussion with sister.         ASSESSMENT/PLAN:   Ginna Mireles is a 67 year old female Ginna Mireles is a 67 year old female w/PMHx of T2DM on insulin, liver cirrhosis, HTN, HLD, CAD, COPD, remote EtOH use disorder, bipolar disorder, and chronic pain admitted on 7/28/2023 for lethargy and fevers, found to be septic with MSSA bacteremia and unknown source. Large R loculated pleural effusion.    Plan for R CT placement with local/fentanyl only      Procedural education reviewed with patient/family in detail including, but not limited to risks, benefits and alternatives with understanding verbalized by patient/family.    Total time spent on the date of the encounter: 60 minutes.      RAKESH PALACIOS APRN CNP  Interventional " Radiology

## 2023-08-08 NOTE — PLAN OF CARE
Problem: Gas Exchange Impaired  Goal: Optimal Gas Exchange  Outcome: Progressing   Goal Outcome Evaluation:                    Pt had chest tube placed this shift. See flowsheets for output. Pt continues to be tachypneic and use accessory muscles for breathing. O2 decreased to 4 lpm oxymask after chest tube insertion.   TPA ordered, administered by SWAT. Pt turned and repositioned Q15 -30 minutes post TPA. Pt's O2 sats in 80s while chest tube not to suction post TPA administration. O2 tuned up to 6 lpm, pt continues to drop into 80s at times.   Chest tube back to suction 2 hours post TPA administration, >1,000 mL yellowish fluid out in first hour, see flowsheets for amount. MD updated, chest tube to remain open at this time, update MD if output becomes bloody. Vitals obtained.   Pt very restless this shift. Frequently removing O2 mask and gown, pulling at pulse oximeter and chest tube. Pt redirected multiple times throughout shift. 1:1 sitter requested for safety.   Pt able to state name and birthday, unable to report current date/month/year. Pt disoriented to current situation. Compliant with swallowing medications crushed in applesauce.

## 2023-08-09 PROBLEM — M47.9 KYPHOSIS DUE TO DEGENERATION OF SPINE: Status: ACTIVE | Noted: 2023-01-01

## 2023-08-09 PROBLEM — J44.9 COPD (CHRONIC OBSTRUCTIVE PULMONARY DISEASE) (H): Status: ACTIVE | Noted: 2023-01-01

## 2023-08-09 PROBLEM — M54.9 BACKACHE: Status: ACTIVE | Noted: 2019-05-29

## 2023-08-09 PROBLEM — D64.9 ANEMIA: Status: ACTIVE | Noted: 2018-07-27

## 2023-08-09 PROBLEM — I82.91 CHRONIC EMBOLISM AND THROMBOSIS OF UNSPECIFIED VEIN: Status: ACTIVE | Noted: 2023-01-01

## 2023-08-09 PROBLEM — F32.9 MAJOR DEPRESSIVE DISORDER: Status: ACTIVE | Noted: 2023-01-01

## 2023-08-09 PROBLEM — Z79.891 LONG TERM (CURRENT) USE OF OPIATE ANALGESIC: Status: ACTIVE | Noted: 2023-01-01

## 2023-08-09 PROBLEM — M40.209 KYPHOSIS DUE TO DEGENERATION OF SPINE: Status: ACTIVE | Noted: 2023-01-01

## 2023-08-09 PROBLEM — Z98.890 S/P HARDWARE REMOVAL: Status: ACTIVE | Noted: 2019-07-02

## 2023-08-09 PROBLEM — K74.60 CIRRHOSIS OF LIVER (H): Status: ACTIVE | Noted: 2023-01-01

## 2023-08-09 PROBLEM — F11.20 OPIOID DEPENDENCE (H): Status: ACTIVE | Noted: 2023-01-01

## 2023-08-09 PROBLEM — M96.1 POST-LAMINECTOMY SYNDROME: Status: ACTIVE | Noted: 2023-01-01

## 2023-08-09 PROBLEM — E78.00 HIGH CHOLESTEROL: Status: ACTIVE | Noted: 2023-01-01

## 2023-08-09 PROBLEM — M47.817 LUMBOSACRAL SPONDYLOSIS WITHOUT MYELOPATHY: Status: ACTIVE | Noted: 2018-07-27

## 2023-08-09 PROBLEM — M54.50 LOW BACK PAIN: Status: ACTIVE | Noted: 2023-01-01

## 2023-08-09 NOTE — PROGRESS NOTES
Patient seen on 3L oxymask. Spo2 94% Neb given as ordered Q6 Albuterol . BS coarse exp wheeze /diminished. RT following.    0555 Patient placed on Bipap for increased WOB 12/5 RR 16 40% Patient appears to be agitated and moaning. RN aware. ABG Pending Results. RT following
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
## 2023-08-09 NOTE — PLAN OF CARE
Goal Outcome Evaluation:  [facility] - ICU    RN Progress Note:            Pertinent Assessments:      Please refer to flowsheet rows for full assessment     Pt incoherent and unable to be redirected, Low BP requiring pressors.           Key Events - This Shift:       Pt becomes very agitated with cares, yells out, and is very difficult to redirect.  Precedex gtt ordered for agitation, titrated as able. Attempt to place NG tube was unsuccessful as patient became to agitated and RN was unable to pass the NG tube, MD aware.  Levophed gtt titrated to achieve MAP goal of 65 mmHg. Titrated supplemental O2 to 1L oxymask.  Pts family met with palliative care today, see provider note. Soft mitts applied for pt pulling at lines and oxygen mask.  Chest tube remains in place and draining.              Barriers to Discharge / Downgrade:     Precedex and levophed gtts.          Point of Contact Update YES-OR-NO: Yes  If No, reason: N/A  Name:Annelise Haider  Phone Number:Updated at bedside  Summary of Conversation: Pt remains very sick, unable to redirect when she moans out.  Giving medications through her IV for blood pressure and to try to keep her comfortable as possible.  Chest tube to remain for now.

## 2023-08-09 NOTE — PROGRESS NOTES
ICU Update:    67yoF with cirrhosis, bipolar disorder who presented with MSSA bacteremia high grade with epidural abscess that is not currently deemed operable with large right-sided loculated effusion s/p chest tube with 2L drainage now hypotension and more encephalopathic.    Patient is not following commands, moving arms but not answering questions. She has had a significant clinical decline over the past 48 hours. I called sister Annelise (Sindy did not answer) to update her on the events overnight. She was not aware patient had liver disease and was planning to come tomorrow to the hospital. I recommended she discuss with her other 3 sisters and plan to come to the hospital today.    I then spoke to Palliative care to ask for assistance in arranging meeting to update patient status. Unclear if family is aware of significant underlying conditions.     Patient is already DNR/DNI.    Full note to follow.    Chrissie Westbrook MD

## 2023-08-09 NOTE — SIGNIFICANT EVENT
"Significant Event Note    Time of event: 12:25 AM August 9, 2023    Description of event:  RAPID RESPONSE EVENT called for hypotension  Patient admitted for MSSA bacteremia, epidural abscess, and right sided pleural effusion with worsening hypoxia with loculation and now is s/p chest tube    RAPID RESPONSE EVENT called for hypotension  BP noted to be 81/48 at bedside, patient now with increased laboring respirations  Per nursing, patient has had over 1.5L output from her chest tube since 10pm (about 2.5 hours)  Now with increased labored breathing  Patient appears to be much more uncomfortable despite 10mg oxycodone given earlier in the evening  Tachypnea between 25-30 breaths per minute  Patient also appears pale, increased capillary refill as well  O2 needs slowly increasing as well    BP 91/42 (BP Location: Left arm)   Pulse 104   Temp 98.4  F (36.9  C) (Axillary)   Resp 25   Ht 1.651 m (5' 5\")   Wt 107.3 kg (236 lb 8.9 oz)   SpO2 91%   BMI 39.36 kg/m      General: Moderate distress, interactive  Skin: pale, clammy. Still warm to touch.  Resp: diminished wheezing and tight breath sounds bilaterally. Labored and shallow breathing.  Cardio: RRR, S1 and S2 present. Pulses 2+ bilaterally.  Extremities: 3+ pitting edema present BLE    Plan:  Patient with increasing BP needs and respiratory needs. Dr. Ferrari, intensivist, evaluated at bedside. Determined that patient now requires ICU level cares given possible need for pressors and worsening respiratory status.    Discussed with: bedside nurse and Dr. Ferrari.    Estefany Galeana MD    "

## 2023-08-09 NOTE — PROGRESS NOTES
Bipap has been on standby since ~0800. Oxygen has been titrated down to 1 lpm via oxymask. Mouth breathing noted. Albuterol nebs given as scheduled without adverse effects. BBS diminished with faint crackles; right side more diminished than left. No changes in breath sounds post treatment.     Dolores Yarbrough, RT

## 2023-08-09 NOTE — PROGRESS NOTES
Palliative Care Initial Social Work Note  Location: [facility]    Patient Info:  Ginna Mireles is a 67 year old female with cirrhosis, bipolar disorder who presented with MSSA bacteremia high grade with epidural abscess that is not currently deemed operable with large right-sided loculated effusion s/p chest tube with 2L drainage now hypotension and more encephalopathic. Patient is not following commands, moving arms but not answering questions. She has had a significant clinical decline over the past 48 hours.    Brief summary of visit: Joint visit with Palliative MD Dr. Hernandez. Met with Pts sisters Sindy and Annelise and other sisters Elaine and Mamie joined by phone. Dr. Hernandez provided medical update and began conversation about goals of care. Sister Annelise seemed to have a good understanding of how fragile Pt is at this time. Other sisters also understand generally, and remain hopeful that she will improve. Talked with family about Pt and what is important to her. They had a difficult time describing her or the quality of their  relationships with Pt. Overall they said she was nice and quiet and said that she had a hard life. She has struggled with mental health concerns and health issues over the years. She has lived in a SNF since 2012 and per family she has not liked this, but could not take care of herself independently. Pts siblings are her main supports. Pt has two children, a son Isidro whom was removed from her care at a young age and a daughter Shereen who lives out of state. Per sisters, Shereen is in contact with the family, but has not seen Pt in over 15 years. Family is providing Shereen with updates on Pts condition. PCSW let family know that we are available to speak with Shereen and provide support, if that is something that would be beneficial down the road.   Provided education, emotional support and life review to family. They are open to ongoing support from Palliative team.     Date of  Admission: 7/28/2023    Reason for consult: Goals of care    Sources of information: Family member sisters - Sindy Castelan, Elaine Hatch    Recommendations & Plan:  PCSW continues to follow for support.        Symptoms & Concerns Addressed Today:  Emotional coping  End of life potential life limiting illness discussed  Family    Strengths Identified:    Family support    Clinical Social Work Interventions:   Assessment of palliative specific issues    Introduction of Palliative clinical social work interventions  Facilitation of processing of thoughts/feelings  Goals of care discussion/facilitation  Life review facilitation      Precious Chacko [facility]  [facility], Palliative Care  Securely message with the [employer] Web Console (learn more here) or  Text page via [facility] Paging/Directory

## 2023-08-09 NOTE — PROGRESS NOTES
[facility]    Medicine Progress Note - Hospitalist Service    Date of Admission:  7/28/2023    Assessment & Plan   Ginna Mireles is a 67 year old female w/PMHx of T2DM on insulin, liver cirrhosis, HTN, HLD, CAD, COPD, remote EtOH use disorder, bipolar disorder, and chronic pain admitted on 7/28/2023 for lethargy and fevers, found to be septic with MSSA bacteremia and unknown source. LINO completed on 8/2/23 and did not show vegetations. MRI lumbar and thoracic with concern for epidural abscess. ID and Neurosurgery do not feel this requires surgical intervention. Will require prolonged course of outpatient IV antibiotics. Patient noted to have increasing oxygen needs, initially felt to be secondary to fluid overloaded status. Diuresed with some improvement but ongoing need for supplemental oxygen. Chest XR 8/6/23 revealed large partially loculated pleural effusion on the right. Pulmonology consulted and thoracentesis ordered. Patient intermittently refused interventions. After multiple attempts at reaching family, spoke with them on 8/7/23 at which time they state that in her current state Ginna does not have decisional capacity. They would like to proceed with thoracentesis. Thoracentesis 8/8/23 with only minimal fluid drained due to numerous septations and loculations. No improvement to respiratory distress following intervention. Chest tube placed on 8/8/23 and patient subsequently developed worsening respiratory distress and hypotension requiring pressors and transfer to ICU. ICU is currently primary team. Palliative now involved.         MSSA bacteremia  LINO without vegetations   MRI with concern for small epidural abscess  Loculated Pleural Effusion of Right Lung s/p thoracentesis and chest tube placement   Acute metabolic encephalopathy   Patient presented with fevers of 103 degrees and AMS. She is usually communicative and uses a walker at baseline; found down in facility. Positive blood  culture x 4 days concerning for high grade MSSA bacteremia. TTE performed 7/29 did not show signs of vegetations. MRI lumbar and thoracic did reveal area of fluid collection concerning for possible epidural abscess, but ID and neurosurgery agree that this is not an operative infection. Patient with gradually increasing oxygen needs, chest XR obtained on 8/5/23 showed large loculated pleural effusion on the right. Pulmonology consulted and agree with thoracentesis. Thoracentesis performed on 8/8/2 with only minimal fluid out given extensive loculations. Chest tube therefore placed 8/8/23 with clinical deterioration thereafter. Transferred to the ICU for increasing oxygen needs and hypotension requiring pressors. ICU now primary.    - ID consulted    - Neurosurg consulted    - Pulm consulted     - LINO completed 8/2/23 and negative for vegetations    - MRI lumbar and thoracic with concerns for small epidural abscess, non-operative    - MRI cervical and head not obtained, patient refused 2/2 pain    - Chest XR shows pleural effusion concerning for worsening pneumonia    - Thoracentesis 8/8/23 with only minimal fluid    - Chest tube placement 8/9/23 with worsening respiratory status thereafter, transferred to ICU   - Continue cefazolin 2g Q8H   - Neurosurg fitted with TLSO brace   - ICU now managing     Increasing Oxygen Needs   Pleural Effusion   Patient initially admitted for sepsis, concern for pneumonia as source as imaging on admission revealed consolidation in the right lower lobe in addition to small right pleural effusion. Has been on broad spectrum abx. Patient with slowly increasing oxygen needs coinciding with aggressive fluids for hypernatremia, see above. Some improvement with diuresis. Chest imaging showed loculated pleural effusion. Worsening respiratory status following above interventions.    - Pulmonology consult    -- ICU managed     Decision Making Capacity    Code Status  Discussed with sisters on  8/7/23. They visited the patient and feel she is not at her baseline mental status and does not have decisional capacity to refuse diagnostic or therapeutic interventions. Reportedly sister Sindy is decision maker. Per chart review, ICU team has contacted patient about current status. Planning for goals of care discussion.    - Palliative consulted     Hypernatremia  Post-Obstructive Diuresis   Treatment Complicated by Fluid Status   Patient noted to have sodium levels of 150+ following admission. Felt to most likely be secondary to dry fluid status in the setting of initial urinary retention and significant post-operative diuresis. Hypernatremia improved with aggressive hypotonic maintenance fluids, but fluids discontinued in the setting of worsening edema and shortness of breath. SOB improved with furosemide, however sodium increased to 153. Will hold on further diuresis for now but will have to be thoughtful about continued maintenance fluids in the setting of increasing respiratory distress and newly identified worsening pleural effusion, see below.    - ICU currently managing     Type II Diabetes   Patient with known diagnosis of type II diabetes. She did have an AGMA on admission, but felt to be more likely 2/2 lactic acidosis. Home diabetes regimen is as follows: 34 units lantus HS + 13 units aspart at breakfast + 14 units aspart at lunch + 34 aspart at dinner + metformin + bydureon + canagliflozin. Hemoglobin A1c on this admission is 8.2. Sugars have continued to be difficult to control.   - Hold PTA metformin, bydureon, canagliflozin.    - 42 units lantus HS    - 1:5 carb counting    - Very high resistance sliding scale regimen    - ICU to alter above regimen as see fit     RLQ pain   H/o cirrhosis 2/2 EtOH use disorder   Transaminitis, improving   Per chart review, patient has remote h/o EtOH use disorder, likely causing cirrhosis. Patient endorsed pain in RLQ  on admission. CT abdomen also did not show  "ascites, which is reassuring.   - CMP daily  - ICU managing     DK - resolved   Cr 1.34 on admission, likely prerenal in setting of sepsis discussed above. Creatinine normalized.   - Daily CMP    - ICU managing     Hypokalemia  - RN replacement protocol    Elevated trops  Trop 37 and 35 on repeat on admission. Likely demand ischemia in setting of infection discussed above. EKG on admission w/o acute ischemic changes.     Constipation  - Scheduled miralax daily   - Senna PRN  - ICU managing     Chronic Conditions:  HTN   - Continue PTA lisinopril 5mg daily   - Hold PTA hydrochlorothiazide 50mg daily     HLD  - Hold PTA pravastatin    Bipolar disorder    - Continue PTA lamotrigine 200mg PO BID  - Continue PTA olanzapine 2.5mg PO daily   - Hold PTA duloxetine 30mg PO daily.   - Hold PTA mirtazapine 45mg PO daily   - Hold PTA rexulti 4mg PO daily     Chronic pain  - Tylenol 500mg QID PRN  - Continue PTA oxycodone 10mg Q4H PRN  - Hold PTA flexeril 10mg TID PRN  - Hold PTA tizanidine 2mg BID Q6H PRN    Acquired hypothyroidism  - Continue PTA synthroid     Diet: NPO for Medical/Clinical Reasons Except for: Meds, Ice Chips    DVT Prophylaxis: Enoxaparin (Lovenox) SQ  [place] Catheter: PRESENT, indication: Retention;Obstruction  Lines: PRESENT      PICC 08/04/23 Single Lumen Right Basilic IV antibiotic-Site Assessment: WDL    Cardiac Monitoring: None  Code Status: No CPR- Do NOT Intubate      Clinically Significant Risk Factors         # Hypernatremia: Highest Na = 152 mmol/L in last 2 days, will monitor as appropriate      # Hypoalbuminemia: Lowest albumin = 2 g/dL at 8/8/2023  6:40 AM, will monitor as appropriate           # Hypertension: Noted on problem list       # DMII: A1C = 8.2 % (Ref range: <5.7 %) within past 6 months   # Obesity: Estimated body mass index is 31.72 kg/m  as calculated from the following:    Height as of this encounter: 1.651 m (5' 5\").    Weight as of this encounter: 86.5 kg (190 lb 9.6 oz).         "     The patient's care was discussed with Dr Sebas Roque MD PGY-2  [facility]    ______________________________________________________________________    Interval History   Patient underwent chest tube placement yesterday with clinical deterioration overnight. Appears she became hypotensive to the point of needing pressors. Respiratory status further declined. Ultimately transferred to the ICU.     Physical Exam   Vital Signs: Temp: 98.8  F (37.1  C) Temp src: Axillary BP: 91/55 Pulse: 77   Resp: 20 SpO2: 98 % O2 Device: Oxymask Oxygen Delivery: 2 LPM  Weight: 190 lbs 9.6 oz  GENERAL: Lethargic, moaning in pain but not able to speak. Clearly in distress. Does not open her eyes to my voice.   EYES: Unable to examen.     RESP:  Clear respiratory distress with very labored breathing. Oxymask in place. Poor air movement.   CV: Systolic murmur heard over right sternal border, radiated to clavicle. No murmur heard of mitral position. Regular rate.   Abdomen: Soft. Patient unable to endorse pain or tenderness.   MSK: Bilateral LE edematous.   SKIN: Dorsal surface of left foot with atypical ecchymosis across toes 2-4. Bilateral palmar surfaces of hands diffusely erythematous but non-tender.     Data     I have personally reviewed the following data over the past 24 hrs:    14.2 (H)  \   10.2 (L)   / 257     151 (H) 115 (H) 27.1 (H) /  174 (H)   4.6 27 0.97 (H) \     ALT: 5 AST: 26 AP: 260 (H) TBILI: 0.5   ALB: 2.1 (L) TOT PROTEIN: 6.6 LIPASE: N/A       Imaging results reviewed over the past 24 hrs:   Recent Results (from the past 24 hour(s))   IR Chest Tube Place Non Tunneled Right    Narrative    EXAM:  1. PERCUTANEOUS CHEST TUBE PLACEMENT RIGHT CHEST  2. ULTRASOUND GUIDANCE  LOCATION: [facility]  DATE/TIME: 8/8/2023 4:13 PM CDT    INTERVENTIONAL RADIOLOGIST: Domo Marx MD.    INDICATION: R loculated effusion, patient has deteriorating clinically.  Thoracentesis earlier today with only 75 mL removed due to loculated nature of the effusion.    PROCEDURE: Informed consent obtained. Time out performed. The site was prepped and draped in sterile fashion. 10 mL of 1% lidocaine was infused into the local soft tissues. Using standard technique and under direct ultrasound guidance, a 12 [place] chest   tube catheter was inserted into the pleural space. The catheter was fixed in place with sutures and adhesive device, and the tubing was banded. Chest tube placed to Pluer-evac suction.    SPECIMEN: None    BLOOD LOSS: Minimal.    The patient tolerated the procedure well. No immediate complications.    RADIOLOGIC SUPERVISION AND INTERPRETATION:   ULTRASOUND GUIDANCE: Images demonstrate the needle and subsequent chest tube to be in good position.          Impression    IMPRESSION:  1.  Ultrasound-guided chest tube placement into right loculated pleural effusion.   XR Chest 1 View    Narrative    EXAM: XR CHEST 1 VIEW  LOCATION: [facility]  DATE: 8/9/2023    INDICATION: Increased work of breathing.  COMPARISON: 08/08/2023      Impression    IMPRESSION: Interval placement of small bore thoracostomy tube which projects over the lateral aspect of the right lung base. Decreased opacification of the right hemithorax with residual atelectasis or infiltrate and layering pleural effusion. Right   upper extremity PICC tip overlies mid SVC level. Minimal streaky left midlung opacities persist. No visible pneumothorax. Partially imaged cervical and thoracolumbar spine fusion hardware.   US Lower Extremity Venous Duplex Bilateral    Narrative    EXAM: US LOWER EXTREMITY VENOUS DUPLEX BILATERAL  LOCATION: [facility]  DATE: 8/9/2023    INDICATION: Hypotension , swelling of LEs, rule out DVT  COMPARISON: None.  TECHNIQUE: Venous Duplex ultrasound of bilateral lower extremities with and without compression, augmentation and duplex. Color  flow and spectral Doppler with waveform analysis performed.    FINDINGS: Exam includes the common femoral, femoral, popliteal veins as well as segmentally visualized deep calf veins and greater saphenous vein.     RIGHT: No deep vein thrombosis. No superficial thrombophlebitis. No popliteal cyst.    LEFT: No deep vein thrombosis. No superficial thrombophlebitis. No popliteal cyst.      Impression    IMPRESSION:  1.  No deep venous thrombosis in the bilateral lower extremities.   XR Chest Port 1 View    Narrative    EXAM: XR CHEST PORT 1 VIEW  LOCATION: [facility]  DATE: 8/9/2023    INDICATION: Follow up chest tube  COMPARISON: 08/09/2023      Impression    IMPRESSION: Stable small bore right chest tube. Stable right PICC. Given differences in technique no significant change in a right pleural effusion and atelectasis/infiltrate in the right lung. No visible pneumothorax. Slight improvement in left mid lung   and retrocardiac opacities. Stable cardiomediastinal silhouette. Lower thoracic spinal fixation hardware.

## 2023-08-09 NOTE — PROGRESS NOTES
PULMONARY / CRITICAL CARE PROGRESS NOTE    Date / Time of Admission:  7/28/2023 12:25 PM    Assessment:   Principal Problem:    Community acquired pneumonia of left lower lobe of lung  Active Problems:    Left leg cellulitis    Altered mental status, unspecified altered mental status type    Sepsis, due to unspecified organism, unspecified whether acute organ dysfunction present (H)    DK (acute kidney injury) (H)    Type 2 diabetes mellitus with hyperglycemia, with long-term current use of insulin (H)    Gram-positive bacteremia    Acute kidney failure, unspecified (H)      Code Status:  No CPR- Do NOT Intubate    67yoF with MSSA bacteremia, small cervical epidural abscess deemed not necessary for evacuation by neurosurgery, Cirrhosis, bipolar disorder who has now developed large right-sided pleural effusion with worsening hypoxia that is loculated and now s/p chest tube. Worsening septic shock post-chest tube placement requirement vasopressor with significant worsening of encephalopathy.       Plan:   Pulmonary:  1) Loculated pleural effusion not consistent with empyema but complicated effusion.   2) Concern for aspiration  3) Acute hypoxic respiratory failure  -Bipap removed, not very hypercapneic  -Wean O2 as able  -Chest tube with tPA/DNAse  -Repeat CXR in am    C/V:  1) Septic shock likely worsened by fluid shifts while draining effusion  -Norepi for MAP>65  -Albumin scheduled  -BID lasix with gentle diuresis given total body overload        ID:  1) MSSA bacteremia  2) Cervical epidural abscess too small for intervention  -Cefazolin IV for 6 weeks  -LINO found no endocarditis  -Follow up cultures from pleural fluid.   -Concerning that WBC increasing will continue to trend.     Renal:  1) Mild DK in setting of sepsis  2) Hypernatremia  -Albumin added to lasix  -Sodium may be our limiting factor to further diuresis  -NG tube was not possible.    GI:  1) Cirrhosis  2) Malnutrition  -NPO as no access for  "nutrition and not safe to eat      Neuro:  1) Encephalopathy presumed hepatic and toxic-metabolic  -Precedex for RASS 0 to -1  -Unable to give IV zyprexa for QTC was 580  -Lactulose held as there is no safe way to administer (NG was attempted and failed).    Heme:  No issues    Endo:  1) Hyperglycemia  -Increase Lantus to 50Units at bedtime. Remains NPO.       ICU DAILY CHECKLIST                           Can patient transfer out of MICU? no    FAST HUG:    Feeding:  Feeding: No.  Patient is receiving NPO    [place]:Yes  Analgesia/Sedation:Yes RASS goal 0 to -1  Thromboembolic prophylaxis: Yes; Mode:  Lovenox  HOB>30:  Yes  Stress Ulcer Protocol Active: Yes; Mode: PPI  Glycemic Control: Any glucose > 180 yes; Mode of Insulin Therapy: Sliding Scale Insulin and Long Acting Insulin    Clinically Significant Risk Factors         # Hypernatremia: Highest Na = 152 mmol/L in last 2 days, will monitor as appropriate      # Hypoalbuminemia: Lowest albumin = 2 g/dL at 8/8/2023  6:40 AM, will monitor as appropriate     # Hypertension: Noted on problem list       # DMII: A1C = 8.2 % (Ref range: <5.7 %) within past 6 months   # Obesity: Estimated body mass index is 31.72 kg/m  as calculated from the following:    Height as of this encounter: 1.651 m (5' 5\").    Weight as of this encounter: 86.5 kg (190 lb 9.6 oz).               Restraints? Yes  PROVIDER RESTRAINT FOR NON-VIOLENT BEHAVIOR FACE TO FACE EVALUATION    Patient's Immediate Situation:  Patient demonstrated the following behaviors: pulling at lines    Patient's Reaction to the intervention:  Does patient understand the reason for restraint/seclusion? No     Medical Condition:  Is there any evidence of compromise of Skin integrity, Respiratory, Cardiovascular, Musculoskeletal, Hydration?   No    Behavioral Condition:  In consultation with the RN, is there a need to continue this restraint or seclusion? Yes    See Restraint Flowsheet for complete restraint documentation and " assessment.        Critical Care Time: 60 minutes additional critical care time  This patient is critically ill due to hemodynamic instability requiring vasopressor.         Subjective:   HPI:  Ginna Mireles is a 67 year old female with cirrhosis (?ETOH remote use), bipolar disorder, DMII residing in nursing home with L3-S1 hardware removal and posterior fusion T10-L3 January 2023 presented 7/28 with fever and altered mental status. She was found to have MSSA bacteremia. Her mental status has continued to wax and wane. She underwent LINO that found no vegetations. MRI found indeterminate epidural fluid collection in lower cervical spine. Neurosurgery was consulted and did not feel intervention warranted. Her T11 end-plate deformity had increased since imaging April 2023 (done by [facility]). They were aware of this finding in April. She was fitted with TLSO brace for when out of bed.      She had an increase in oxygen requirements yesterday and chest Xray shows large right-sided effusion.      After attempted thoracentesis, patient with increasing respiratory distress, abdominal breathing. ABG ok, patient still responsive but increased O2 requirement. I called radiology to expedite chest tube placement which they kindly placed. Only 200cc out from initial placement but overnight put out over 2L and became hypotensive.      Principal Problem:    Community acquired pneumonia of left lower lobe of lung  Active Problems:    Left leg cellulitis    Altered mental status, unspecified altered mental status type    Sepsis, due to unspecified organism, unspecified whether acute organ dysfunction present (H)    DK (acute kidney injury) (H)    Type 2 diabetes mellitus with hyperglycemia, with long-term current use of insulin (H)    Gram-positive bacteremia    Acute kidney failure, unspecified (H)      Allergies: Hydroxyzine, Mushroom, Penicillins, and Tamiflu [oseltamivir]     MEDS:  Scheduled Meds:   albuterol  2.5 mg  "Nebulization Q6H    alteplase (ACTIVASE) 10 mg, dornase guanaco (PULMOZYME) 5 mg in sodium chloride 0.9 % 50 mL for chest tube instillation in syringe  50 mL Chest Tube BID    ceFAZolin  2 g Intravenous Q8H    [Held by provider] DULoxetine  30 mg Oral Daily    enoxaparin ANTICOAGULANT  40 mg Subcutaneous Q24H    furosemide  40 mg Intravenous Q12H    insulin aspart  1-10 Units Subcutaneous Q4H NICHOLAS    insulin glargine  45 Units Subcutaneous At Bedtime    lamoTRIgine  200 mg Oral BID    levothyroxine  137 mcg Oral Daily    lisinopril  2.5 mg Oral Daily    miconazole   Topical BID    multivitamin w/minerals  1 tablet Oral Daily    [Held by provider] OLANZapine  2.5 mg Oral At Bedtime    pantoprazole  40 mg Oral QAM AC    polyethylene glycol  17 g Oral Daily    sodium chloride (PF)  3 mL Intracatheter Q8H     Continuous Infusions:   dexmedeTOMIDine 0.3 mcg/kg/hr (08/09/23 1347)    norepinephrine 0.08 mcg/kg/min (08/09/23 1347)     PRN Meds:.acetaminophen, acetaminophen, bisacodyl **OR** bisacodyl, glucose **OR** dextrose **OR** glucagon, glucose **OR** dextrose **OR** glucagon, insulin aspart, lidocaine 4%, lidocaine (buffered or not buffered), melatonin, naloxone **OR** naloxone **OR** naloxone **OR** naloxone, ondansetron **OR** ondansetron, oxyCODONE IR, prochlorperazine **OR** prochlorperazine **OR** prochlorperazine, sodium chloride (PF), sodium chloride (PF)      Objective:   VITALS:  BP 91/55   Pulse 77   Temp 98.8  F (37.1  C) (Axillary)   Resp 20   Ht 1.651 m (5' 5\")   Wt 86.5 kg (190 lb 9.6 oz)   SpO2 98%   BMI 31.72 kg/m    EXAM:  General appearance: appears older than stated age and disheveled, agitated  Neck: no adenopathy and supple, symmetrical, trachea midline  Lungs:  decreased breath sounds on right.   Heart: RRR, S1 and S2  Abdomen: soft, non-tender; bowel sounds normal; no masses,  no organomegaly  Extremities: pitting edema bilaterally  Skin: Skin color, texture, turgor normal. No rashes or " lesions  Neurologic: jerking movements with arms, moaning, not following commands.     I&O:     Intake/Output Summary (Last 24 hours) at 8/9/2023 1634  Last data filed at 8/9/2023 1614  Gross per 24 hour   Intake 962.23 ml   Output 3580 ml   Net -2617.77 ml           Data Review:  Chest Xray  Personally reviewed image/s and Personally reviewed impression/s  Narrative & Impression   EXAM: XR CHEST PORT 1 VIEW  LOCATION: [facility]  DATE: 8/9/2023     INDICATION: Follow up chest tube  COMPARISON: 08/09/2023                                                                      IMPRESSION: Stable small bore right chest tube. Stable right PICC. Given differences in technique no significant change in a right pleural effusion and atelectasis/infiltrate in the right lung. No visible pneumothorax. Slight improvement in left mid lung   and retrocardiac opacities. Stable cardiomediastinal silhouette. Lower thoracic spinal fixation hardware.       LABS      Latest Ref Rng & Units 8/4/2023     5:34 AM 8/5/2023     5:36 AM 8/6/2023     5:43 AM 8/7/2023     5:23 AM 8/8/2023     6:40 AM 8/9/2023    12:19 AM 8/9/2023     5:45 AM   Glucose Values   Glucose 70 - 99 mg/dL 259  260  239  243  242  288  271        Results for orders placed or performed during the hospital encounter of 07/28/23   Basic metabolic panel   Result Value Ref Range    Sodium 150 (H) 136 - 145 mmol/L    Potassium 4.7 3.4 - 5.3 mmol/L    Chloride 115 (H) 98 - 107 mmol/L    Carbon Dioxide (CO2) 27 22 - 29 mmol/L    Anion Gap 8 7 - 15 mmol/L    Urea Nitrogen 25.6 (H) 8.0 - 23.0 mg/dL    Creatinine 0.94 0.51 - 0.95 mg/dL    Calcium 8.0 (L) 8.8 - 10.2 mg/dL    Glucose 288 (H) 70 - 99 mg/dL    GFR Estimate 66 >60 mL/min/1.73m2     Lab Results   Component Value Date    WBC 14.2 (H) 08/09/2023    HGB 10.2 (L) 08/09/2023    HCT 34.7 (L) 08/09/2023    MCV 99 08/09/2023     08/09/2023       ABG:  Recent Labs   Lab 08/09/23  0715  08/08/23  1528   PH 7.37 7.36*   PCO2 52* 53*   PO2 134* 84   HCO3 30* 30*             By:  Chrissie Westbrook MD  Pulmonary/Critical Care

## 2023-08-09 NOTE — PROGRESS NOTES
ID brief note.    Doing poorly post chest tube, needed as tap minimal due to loculations see notes    On ancef but things may be drawing closer to comfort care type situation.    She remains on my list.     DR Enriquez

## 2023-08-09 NOTE — PLAN OF CARE
Goal Outcome Evaluation:  [facility] - ICU    RN Progress Note:            Pertinent Assessments:      Please refer to flowsheet rows for full assessment     AMS. Restless and agitated at times. Incoherent. Labile BP. Tmax 99.5. Oxymask 3L           Key Events - This Shift:       Transferred to ICU from P4 with AMS, dyspnea, low BP. Started on levophed to maintain BP. Tachypnea with increased O2 requirement. Placed on BiPAP around 6AM, off currently and back to oxymask 3L.               Barriers to Discharge / Downgrade:      Chest tube in place. AMS.

## 2023-08-09 NOTE — PROGRESS NOTES
SPIRITUAL HEALTH SERVICES ([facility])  SPIRITUAL ASSESSMENT Progress Note  [facility]. Unit ICU    REFERRAL SOURCE: LOS     Met briefly with Annelise and Ginna Ryan's sisters. The sisters are still  processing what  they learned during the palliative care meeting earlier today but feel they have enough information at this time. Ginna does not have a Mos[employer] affiliation but the sisters do and are open to prayer.      PLAN: SHS will follow, as able, during LOS.     Deborah Lynch, Ph.D., [facility]      SHS available 24/7 for emergency requests/referrals, either by having the on-call  paged or by entering an ASAP/STAT consult in Epic (this will also page the on-call ).

## 2023-08-09 NOTE — PROGRESS NOTES
PULMONARY / CRITICAL CARE PROGRESS NOTE    Date / Time of Admission:  7/28/2023 12:25 PM    Assessment:     Ginna Mireles is a 67 year old female with history of HTN, DM, hypothyroidism, cirrhosis, bipolar disorder, s/p thoracic and lumbar spine surgery. Nursing home resident.   Presented to ED for evaluation of AMS on 7/28/2023. Diagnosed with MSSA bacteremia.   MRI found indeterminate epidural fluid collection in lower cervical spine. Neurosurgery was consulted and did not feel intervention warranted. Her T11 end-plate deformity had increased since imaging April 2023 (done by [facility]). LINO 8/2/2023 was negative for vegetations.   Patient was seen by pulmonary service for evaluation of increase O2 requirements, CXR showed large right pleural effusion. Loculated effusion per US. Underwent chest tube placement on 8/9/2023.   Started on intrapleural lytics. Had 1000 ml of yellowish fluid during the fist hour.   Rapid response was called due to hypotension, dyspnea and altered mental status.   Patient was transferred to ICU.    Acute respiratory failure  Clinically volume up. Follow up CXR showed pulmonary congestion, decrease right pleural effusion.   Code status is DNR.   Titrate FiO2. BiPAP as needed.   Minimize pain meds and sedatives  Hypotension   Clinically volume up.   Recent echocardiogram showed preserved LV function.   On treatment for MSSA bacteremia.   Check venous US LEs.  Check cortisol level   Pressors as needed to keep MAP > 65   Complicated right pleural effusion   Large loculated effusion, exudate by Light criteria.   Continue intrapleural lytics.   Follow up cultures.   MSSA bacteremia   High risk. Unclear etiology. LINO negative. Indeterminate epidural fluid collection in lower cervical spine.   Indeterminate dorsal epidural fluid collection in the lower cervical/proximal thoracic spine   Neurosurgery / orthopedic service are following.   Metabolic encephalopathy   DM with hyperglycemia    Hypernatremia   Emphysematous changes per CT scan  Hx liver cirrhosis   Hx bipolar disorder    Advance Directives:  DNR    Plan:   Titrate FiO2, keep SpO2 > 90%, currently on oxymask 3 -6 LPM   BiPAP as needed  Schedule bronchodilators   Intrapleural lytics  Chest tube connected to suction.   Follow up CXR  Heplock IV fluids  Pressors to keep MAP > 65, NE drip   Check cortisol level   Start IV diuresis   Follow up pleural fluid cultures   Abx per ID recommendation cefazolin IV  Monitor kidney function and supplement electrolytes as needed  NPO  PPI IV  Glucose level monitoring, insulin drip if persistent hyperglycemia  Venous US both LEs  DVT prophylaxis lovenox SQ    Please contact me if you have any questions.  Total critical care time, not including separately billable procedure time: 45 minutes  This patient had a high probability of imminent or life threatening deterioration due to acute respiratory failure which required my direct attention, intervention and personal management.     Femi Medina  Pulmonary / Critical Care  08/09/2023  12:54 AM          ICU DAILY CHECKLIST                           Can patient transfer out of MICU? no    FAST HUG:    Feeding:  Feeding: no.  Patient is receiving NPO    [place]: yes  Analgesia/Sedation:no  none  Thromboembolic prophylaxis: Yes; Mode:  Lovenox and SCDs  HOB>30:  Yes  Stress Ulcer Protocol Active: Yes; Mode: PPI  Glycemic Control: Any glucose > 180 yes; Mode of Insulin Therapy: Sliding Scale Insulin and Long Acting Insulin    INTUBATED:  Can patient have daily waking:  No  Can patient have spontaneous breathing trial:  No    Restraints? no    PHYSICAL THERAPY AND MOBILITY:  Can patient have PT and mobility trial: no  Activity: bedrest    Subjective:   HPI:  Ginna Mireles is a 67 year old female with history of HTN, DM, hypothyroidism, cirrhosis, bipolar disorder, s/p thoracic and lumbar spine surgery. Nursing home resident.   Presented to ED for  evaluation of AMS on 7/28/2023. Diagnosed with MSSA bacteremia.   MRI found indeterminate epidural fluid collection in lower cervical spine. Neurosurgery was consulted and did not feel intervention warranted. Her T11 end-plate deformity had increased since imaging April 2023 (done by [facility]). LINO 8/2/2023 was negative for vegetations.   Patient was seen by pulmonary service for evaluation of increase O2 requirements, CXR showed large right pleural effusion. Loculated effusion per US. Underwent chest tube placement on 8/9/2023.   Started on intrapleural lytics. Had 1000 ml of yellowish fluid during the fist hour.   Rapid response was called due to hypotension, dyspnea and altered mental status.   Patient was transferred to ICU.    Allergies: Hydroxyzine, Mushroom, Penicillins, and Tamiflu [oseltamivir]     MEDS:  Current Facility-Administered Medications   Medication    acetaminophen (TYLENOL) tablet 500 mg    alteplase (ACTIVASE) 10 mg, dornase guanaco (PULMOZYME) 5 mg in sodium chloride 0.9 % 50 mL for chest tube instillation in syringe    bisacodyl (DULCOLAX) EC tablet 5 mg    Or    bisacodyl (DULCOLAX) EC tablet 10 mg    ceFAZolin (ANCEF) 2 g in 100 mL D5W intermittent infusion    glucose gel 15-30 g    Or    dextrose 50 % injection 25-50 mL    Or    glucagon injection 1 mg    glucose gel 15-30 g    Or    dextrose 50 % injection 25-50 mL    Or    glucagon injection 1 mg    [Held by provider] DULoxetine (CYMBALTA) DR capsule 30 mg    [Held by provider] enoxaparin ANTICOAGULANT (LOVENOX) injection 40 mg    insulin aspart (NovoLOG) injection (RAPID ACTING)    insulin aspart (NovoLOG) injection (RAPID ACTING)    insulin aspart (NovoLOG) injection (RAPID ACTING)    insulin aspart (NovoLOG) injection (RAPID ACTING)    insulin aspart (NovoLOG) injection (RAPID ACTING)    insulin aspart (NovoLOG) injection (RAPID ACTING)    insulin glargine (LANTUS PEN) injection 45 Units    lamoTRIgine (LaMICtal) tablet 200 mg     "levothyroxine (SYNTHROID/LEVOTHROID) tablet 137 mcg    lidocaine (LMX4) cream    lidocaine 1 % 0.1-1 mL    lisinopril (ZESTRIL) tablet 2.5 mg    melatonin tablet 1 mg    miconazole (MICATIN) 2 % powder    multivitamin w/minerals (THERA-VIT-M) tablet 1 tablet    naloxone (NARCAN) injection 0.2 mg    Or    naloxone (NARCAN) injection 0.4 mg    Or    naloxone (NARCAN) injection 0.2 mg    Or    naloxone (NARCAN) injection 0.4 mg    norepinephrine (LEVOPHED) 4 mg in  mL infusion PREMIX    OLANZapine (zyPREXA) tablet 2.5 mg    ondansetron (ZOFRAN ODT) ODT tab 4 mg    Or    ondansetron (ZOFRAN) injection 4 mg    oxyCODONE (ROXICODONE) tablet 5-10 mg    pantoprazole (PROTONIX) EC tablet 40 mg    polyethylene glycol (MIRALAX) Packet 17 g    prochlorperazine (COMPAZINE) injection 5 mg    Or    prochlorperazine (COMPAZINE) tablet 5 mg    Or    prochlorperazine (COMPAZINE) suppository 12.5 mg    sodium chloride (PF) 0.9% PF flush 10-40 mL    sodium chloride (PF) 0.9% PF flush 3 mL    sodium chloride (PF) 0.9% PF flush 3 mL       Objective:   VITALS:  BP 91/42 (BP Location: Left arm)   Pulse 104   Temp 98.5  F (36.9  C) (Oral)   Resp 25   Ht 1.651 m (5' 5\")   Wt 86.5 kg (190 lb 9.6 oz)   SpO2 91%   BMI 31.72 kg/m    VENT:  Resp: 25    EXAM:   Gen: awake, confuse, mild respiratory distress  HEENT: pale conjunctiva, moist mucosa  Neck: no thyromegaly, masses or JVD  Lungs: decrease breath sounds R>L base, discrete ronchi both HT  CV: regular, tachycardic, no murmurs or gallops appreciated  Abdomen: soft, NT, BS wnl  Ext: edema 2+ both LEs  Neuro: CN II-XII intact, non focal       Data Review:  Recent Labs   Lab 08/09/23  0019 08/08/23  2101 08/08/23  1203 08/08/23  0716 08/08/23  0640 08/08/23  0221   * 239* 273* 188* 242* 189*        08/08/23 13:38 08/09/23 00:19   Sodium  150 (H)   Potassium  4.7   Chloride  115 (H)   Carbon Dioxide (CO2)  27   Urea Nitrogen  25.6 (H)   Creatinine  0.94   GFR Estimate  66 "   Calcium  8.0 (L)   Anion Gap  8   Glucose  288 (H)   Lactate Dehydrogenase 189       08/09/23 00:19   WBC 12.3 (H)   Hemoglobin 10.6 (L)   Hematocrit 35.3   Platelet Count 207   RBC Count 3.62 (L)   MCV 98   MCH 29.3   MCHC 30.0 (L)   RDW 17.9 (H)   % Neutrophils 86   % Lymphocytes 7   % Monocytes 5   % Eosinophils 1   % Basophils 0      08/08/23 10:34   Cell Count Fluid Source Pleural Cavity, Right   Total Nucleated Cells 171   Color Fluid Yellow   Appearance Fluid Hazy !   Glucose Fluid Source Pleural Cavity, Right   Glucose Fluid 110   LD Fluid Source Pleural Cavity, Right   Lactate Dehydrogenase Fluid 1,868   pH Fluid Source Pleural Cavity, Right   Ph Fluid 8.0   Protein Fluid Source Pleural Cavity, Right   Protein Total Fluid 3.2     CT THORACIC SPINE W/O CONTRAST, CT LUMBAR SPINE W/O CONTRAST  DATE: 8/6/2023  MPRESSION:  THORACIC AND LUMBAR SPINE CT:  1.  Posterior instrumented fusion from T11 to S1 with extension into the iliac bones. Anterior and interbody fusion from L3-L4 to L5-S1. The surgical hardware appear intact without evidence for fracture or loosening.   2.  Partially visualized ACDF changes of the cervical spine extending to T1. In addition there are posterior instrumented fusion of the cervical spine extending down to T1.   3.  No significant change in the mild superior endplate compression fracture of T11 with the fracture plane present. The fracture involves the superior endplate and extends posteriorly to involve the left T11 pedicle. The fracture involving the left   lateral T11 vertebral body and pedicles slightly superior to the left-sided transpedicular screw. Stable mild asymmetric widening of the T10-T11 intervertebral disc space although new compared to radiograph 04/05/2023.  4.  Please note the fluid collection involving the dorsal epidural space in the lower cervical and upper thoracic spine described on the prior thoracic spine MRI 08/02/2023 is not well seen on CT.  5.  Moderate  soft tissue edema in the posterior paraspinal soft tissues in the lower thoracic spine and throughout the lumbar spine.  6.  Large right pleural effusion and atelectasis or collapse of most of the right lung.     LINO 8/2/2023  Interpretation Summary   No valvular vegetation identified.  The visual ejection fraction is 60-65%.  The right ventricle is normal in size and function.  Atrial septum is aneurysmal.  PFO present with spontaneous left to right shunt.  A contrast injection (Bubble Study) was performed that was mildly positive for  flow across the interatrial septum.     XR CHEST PORT 1 VIEW  LOCATION: [facility]  DATE: 8/8/2023   INDICATION: worsening respiratory distress following thoracentesis  COMPARISON: 08/05/2023  IMPRESSION: Right-sided PICC line with tip over SVC. Increasing large right pleural effusion with decreased aeration of the right upper lobe. Clear left lung. Stable cardiomediastinal silhouette. Cervical and thoracolumbar fusion hardware.    CXR 8/9/2023 00:37 AM  IMPRESSION: Interval placement of small bore thoracostomy tube which projects over the lateral aspect of the right lung base. Decreased opacification of the right hemithorax with residual atelectasis or infiltrate and layering pleural effusion. Right   upper extremity PICC tip overlies mid SVC level. Minimal streaky left midlung opacities persist. No visible pneumothorax. Partially imaged cervical and thoracolumbar spine fusion hardware.     By:  Femi Medina MD, 08/09/2023  12:54 AM    Primary Care Physician:  Vandana Almazan

## 2023-08-10 NOTE — PROGRESS NOTES
[facility]    Medicine Progress Note - Hospitalist Service    Date of Admission:  7/28/2023    Assessment & Plan   Ginna Mireles is a 67 year old female w/PMHx of T2DM on insulin, liver cirrhosis, HTN, HLD, CAD, COPD, remote EtOH use disorder, bipolar disorder, and chronic pain admitted on 7/28/2023 for lethargy and fevers, found to be septic with MSSA bacteremia and unknown source. LINO completed on 8/2/23 and did not show vegetations. MRI lumbar and thoracic with concern for epidural abscess. ID and Neurosurgery do not feel this requires surgical intervention. Will require prolonged course of outpatient IV antibiotics. Patient noted to have increasing oxygen needs, initially felt to be secondary to fluid overloaded status. Diuresed with some improvement but ongoing need for supplemental oxygen. Chest XR 8/6/23 revealed large partially loculated pleural effusion on the right. Pulmonology consulted and thoracentesis ordered. Patient intermittently refused interventions. After multiple attempts at reaching family, spoke with them on 8/7/23 at which time they state that in her current state Ginna does not have decisional capacity. They would like to proceed with thoracentesis. Thoracentesis 8/8/23 with only minimal fluid drained due to numerous septations and loculations. No improvement to respiratory distress following intervention. Chest tube placed on 8/8/23 and patient subsequently developed worsening respiratory distress and hypotension requiring pressors and transfer to ICU. ICU is currently primary team. Palliative now involved.         MSSA bacteremia  LINO without vegetations   MRI with concern for small epidural abscess  Loculated Pleural Effusion of Right Lung s/p thoracentesis and chest tube placement   Acute metabolic encephalopathy   Patient presented with fevers of 103 degrees and AMS. She is usually communicative and uses a walker at baseline; found down in facility. Positive blood  culture x 4 days concerning for high grade MSSA bacteremia. TTE performed 7/29 did not show signs of vegetations. MRI lumbar and thoracic did reveal area of fluid collection concerning for possible epidural abscess, but ID and neurosurgery agree that this is not an operative infection. Patient with gradually increasing oxygen needs, chest XR obtained on 8/5/23 showed large loculated pleural effusion on the right. Pulmonology consulted and agree with thoracentesis. Thoracentesis performed on 8/8/2 with only minimal fluid out given extensive loculations. Chest tube therefore placed 8/8/23 with clinical deterioration thereafter. Transferred to the ICU for increasing oxygen needs and hypotension requiring pressors. ICU now primary.    - ID consulted    - Neurosurg consulted    - Pulm consulted     - LINO completed 8/2/23 and negative for vegetations    - MRI lumbar and thoracic with concerns for small epidural abscess, non-operative    - MRI cervical and head not obtained, patient refused 2/2 pain    - Chest XR shows pleural effusion concerning for worsening pneumonia    - Thoracentesis 8/8/23 with only minimal fluid    - Chest tube placement 8/9/23 with worsening respiratory status thereafter, transferred to ICU   - Continue cefazolin 2g Q8H   - Neurosurg fitted with TLSO brace   - ICU now managing     Increasing Oxygen Needs   Pleural Effusion   Patient initially admitted for sepsis, concern for pneumonia as source as imaging on admission revealed consolidation in the right lower lobe in addition to small right pleural effusion. Has been on broad spectrum abx. Patient with slowly increasing oxygen needs coinciding with aggressive fluids for hypernatremia, see above. Some improvement with diuresis. Chest imaging showed loculated pleural effusion. Worsening respiratory status following above interventions. Chest imaging continues to show pleural effusion.    - ICU managing     Decision Making Capacity    Code  Status  Discussed with sisters on 8/7/23. They visited the patient and feel she is not at her baseline mental status and does not have decisional capacity to refuse diagnostic or therapeutic interventions. Reportedly sister Sindy is decision maker. Per chart review, ICU team has contacted patient about current status. Planning for goals of care discussion.    - Palliative consulted     Hypernatremia  Post-Obstructive Diuresis   Treatment Complicated by Fluid Status   Patient noted to have sodium levels of 150+ following admission. Felt to most likely be secondary to dry fluid status in the setting of initial urinary retention and significant post-operative diuresis. Hypernatremia improved with aggressive hypotonic maintenance fluids, but fluids discontinued in the setting of worsening edema and shortness of breath. SOB improved with furosemide, however sodium increased to 153. Will hold on further diuresis for now but will have to be thoughtful about continued maintenance fluids in the setting of increasing respiratory distress and newly identified worsening pleural effusion, see below.    - ICU currently managing     Type II Diabetes   Patient with known diagnosis of type II diabetes. She did have an AGMA on admission, but felt to be more likely 2/2 lactic acidosis. Home diabetes regimen is as follows: 34 units lantus HS + 13 units aspart at breakfast + 14 units aspart at lunch + 34 aspart at dinner + metformin + bydureon + canagliflozin. Hemoglobin A1c on this admission is 8.2. Sugars have continued to be difficult to control.   - Hold PTA metformin, bydureon, canagliflozin.    - 42 units lantus HS    - 1:5 carb counting    - Very high resistance sliding scale regimen    - ICU to alter above regimen as see fit     RLQ pain   H/o cirrhosis 2/2 EtOH use disorder   Transaminitis, improving   Per chart review, patient has remote h/o EtOH use disorder, likely causing cirrhosis. Patient endorsed pain in RLQ  on  admission. CT abdomen also did not show ascites, which is reassuring.   - CMP daily  - ICU managing     DK - resolved   Cr 1.34 on admission, likely prerenal in setting of sepsis discussed above. Creatinine normalized.   - Daily CMP    - ICU managing     Hypokalemia  - RN replacement protocol    Elevated trops  Trop 37 and 35 on repeat on admission. Likely demand ischemia in setting of infection discussed above. EKG on admission w/o acute ischemic changes.     Constipation  - Scheduled miralax daily   - Senna PRN  - ICU managing     Chronic Conditions:  HTN   - Continue PTA lisinopril 5mg daily   - Hold PTA hydrochlorothiazide 50mg daily     HLD  - Hold PTA pravastatin    Bipolar disorder    PTA meds include: lamotrigine 200mg PO BID, olanzapine 2.5mg PO daily, duloxetine 30mg PO daily, mirtazapine 45mg PO daily, rexulti 4mg PO daily    - ICU managing     Chronic pain  PTA regimen includes: Tylenol 500mg QID PRN, oxycodone 10mg Q4H PRN, flexeril 10mg TID PRN, tizanidine 2mg BID Q6H PRN   - ICU managing     Acquired hypothyroidism  - Continue PTA synthroid     Diet: NPO for Medical/Clinical Reasons Except for: No Exceptions    DVT Prophylaxis: Enoxaparin (Lovenox) SQ  [place] Catheter: PRESENT, indication: Retention;Obstruction  Lines: PRESENT      PICC 08/10/23 Triple Lumen Right Basilic Vascular Access-Site Assessment: WDL;Ecchymotic  [REMOVED] PICC 08/04/23 Single Lumen Right Basilic IV antibiotic-Site Assessment: WDL    Cardiac Monitoring: None  Code Status: No CPR- Do NOT Intubate      Clinically Significant Risk Factors         # Hypernatremia: Highest Na = 155 mmol/L in last 2 days, will monitor as appropriate      # Hypoalbuminemia: Lowest albumin = 2 g/dL at 8/8/2023  6:40 AM, will monitor as appropriate           # Hypertension: Noted on problem list       # DMII: A1C = 8.2 % (Ref range: <5.7 %) within past 6 months   # Obesity: Estimated body mass index is 34.05 kg/m  as calculated from the following:     "Height as of this encounter: 1.651 m (5' 5\").    Weight as of this encounter: 92.8 kg (204 lb 9.4 oz).             The patient's care was discussed with Dr Sebas Roque MD PGY-2  [facility]    ______________________________________________________________________    Interval History   Patient in ICU. Appears a little more interactive today, asking for food. ICU continues to manage.     Physical Exam   Vital Signs: Temp: 98.4  F (36.9  C) Temp src: Axillary BP: 105/57 Pulse: 76   Resp: 22 SpO2: 94 % O2 Device: Oxymask Oxygen Delivery: 1 LPM  Weight: 204 lbs 9.39 oz  GENERAL: Oxymask in place. Asking for food. Not oriented to situation.   EYES: Unable to examen.     RESP:  Oxymask in place. Improvement to degree of labored breathing.   CV: Systolic murmur heard over right sternal border, radiated to clavicle.   Abdomen: Soft. Does not appear distended.   MSK: Bilateral LE edematous.   SKIN: Dorsal surface of left foot with atypical ecchymosis across toes 2-4. Bilateral palmar surfaces of hands diffusely erythematous but non-tender.     Data     I have personally reviewed the following data over the past 24 hrs:    9.9  \   10.5 (L)   / 217     155 (H) 119 (H) 23.4 (H) /  78   4.2 28 0.81 \     ALT: <5 AST: 25 AP: 230 (H) TBILI: 0.5   ALB: 2.3 (L) TOT PROTEIN: 7.0 LIPASE: N/A       Imaging results reviewed over the past 24 hrs:   Recent Results (from the past 24 hour(s))   XR Chest Port 1 View    Narrative    EXAM: XR CHEST PORT 1 VIEW  LOCATION: [facility]  DATE: 8/10/2023    INDICATION: Chest tube placement  COMPARISON: None.      Impression    IMPRESSION: Again noted, small bore thoracostomy tube which projects over the lateral aspect of the right lung base. Heart size and pulmonary vasculature are stable. Stable opacification of the right hemithorax with residual atelectasis or infiltrate and   layering pleural effusion. Right upper extremity PICC tip " overlies mid SVC level. Minimal streaky left midlung opacities persist. No visible pneumothorax. Partially imaged cervical and thoracolumbar spine fusion hardware. EKG wires and leads projecting   over the chest obscures some details.   XR Abdomen Port 1 View    Narrative    EXAM: XR ABDOMEN PORT 1 VIEW  LOCATION: [facility]  DATE: 8/10/2023    INDICATION: feeding tube placement  COMPARISON: None.      Impression    IMPRESSION: A feeding tube tip is located within the stomach, near the midline. No dilated loops of bowel. Extensive spinal fusion hardware. The thorax is evaluated on same-day chest x-ray.

## 2023-08-10 NOTE — PROGRESS NOTES
CLINICAL NUTRITION SERVICES - ASSESSMENT NOTE     Nutrition Prescription    RECOMMENDATIONS FOR MDs/PROVIDERS TO ORDER:  -Monitor electrolytes as patient is at risk for refeeding syndrome    Malnutrition Status:    Moderate in the context of acute illness    Recommendations already ordered by Registered Dietitian (RD):  -Initiate Vital 1.5 at 10 ml/hr and advance by 10 ml every 8 hours as tolerated, until goal of 50 ml is reached.   Goal: Vital 1.5 at 50 ml/hr for 22 hours + FWF per MD order  *Hold 1 hour before and after levothyroxine dose  Provides: 1650 kcal, 78 g pro, 206 g CHO, 63 g fat, 6.6 g fiber, 840 ml free water    -RD ordered Mag and Phos lab checks    Future/Additional Recommendations:  Weights, EN tolerance, BG, electrolytes for refeeding, fluid status, BMs     REASON FOR ASSESSMENT  Ginna Mireles is a 67 year old female assessed by the dietitian for Provider Order - Registered Dietitian to Assess and Order TF per Medical Nutrition Therapy Protocol    NUTRITION HISTORY  67 year old female with a history of hypertension, hyperlipidemia, CAD, COPD, chronic pain, bipolar disorder, hypothyroidism, and type II diabetes who presents for evaluation of altered mental status, found to have sepsis, DK, pneumonia. Lives in a nursing home. Patient was originally on the floors, on a consistent carbohydrate diet, and was followed by multiple Rds for poor appetite and intake. Tried magic cup and glucerna, which she liked. On 7/29, patient said her appetite was poor for a few weeks PTA. Prior to 8/3, patient was eating <50% of meals, and RD recommended enteral nutrition if intake did not improve. Was diagnosed with moderate malnutrition 8/3. Was transferred to ICU 8/9 as status declined. Has been NPO since 8/8, and prior was eating ok, with some documentation showing % of meals eaten. 8/7 assessment estimated intake 7321-0248 kcal, 53-82 g protein/day.     CURRENT NUTRITION ORDERS  Diet: NPO, free water 100  "ml/hour continuous, 8/10 xray states: \"feeding tube tip is located within the stomach\"  Intake/Tolerance: Unable to eat by mouth. Asked when she could eat 8/10    LABS  Labs reviewed    Na 155 (H)   Glu 124-276  A1c 8.2 - 8/2/23    MEDICATIONS  Medications reviewed    Lantus, levothyroxine starting 8/11, MV w/ minerals starting 8/11, lactulose, ancef (abx)  Drips: norepi, precedex    ANTHROPOMETRICS  Height: 165.1 cm (5' 5\")  Most Recent Weight: 92.8 kg (204 lb 9.4 oz) - 8/10   IBW: 56.8 kg (125 lb)   BMI: Obesity Grade I BMI 30-34.9  Weight History: Weight has fluctuated over 2 week hospital, with current weight 4 lb higher than admit. Increases may be due to fluid retention and edema, and decreases are likely due to diuresis, as cumulative I/O since admit were at -15L 8/9.     6/22/22 - Wt 93.9 kg (207 lb)   1/30/23 - 90.3 kg (199 lb)   Vitals:    07/28/23 1252 07/29/23 0421 07/30/23 0616 07/31/23 0452   Weight: 90.7 kg (200 lb) 96.1 kg (211 lb 13.8 oz) 93 kg (205 lb 0.4 oz) 94.3 kg (207 lb 14.3 oz)    08/04/23 0839 08/09/23 0034 08/10/23 0400   Weight: 107.3 kg (236 lb 8.9 oz) 86.5 kg (190 lb 9.6 oz) 92.8 kg (204 lb 9.4 oz)     Dosing Weight: 65.8 kg (adjusted)    ASSESSED NUTRITION NEEDS  Estimated Energy Needs: 1126-3573 kcals/day (25 - 30 kcals/kg)  Justification: Maintenance and Obese  Estimated Protein Needs: 79-99 grams protein/day (1.2 - 1.5 grams of pro/kg)  Justification: Increased needs (DK) and Obesity guidelines  Estimated Fluid Needs: 0343-2663 mL/day (30 - 35 mL/kg)   Justification: Increased needs    PHYSICAL FINDINGS  See malnutrition section below.    Last BM 8/9 (loose)  1+ hand edema, 3+ leg, ankle, foot edema  2L drainage from chest tube 8/9    MALNUTRITION:  % Weight Loss:  Weight loss does not meet criteria for malnutrition - could be masked by fluid retention  % Intake:  <75% for > 7 days (moderate malnutrition)  Subcutaneous Fat Loss:  None observed  Muscle Loss:  Temporal region mild " depletion  Fluid Retention:  Moderate 3+ leg, ankle, foot edema, 1+ hand edema    Malnutrition Diagnosis: Moderate malnutrition  In Context of:  Acute illness or injury    NUTRITION DIAGNOSIS  Malnutrition related to AMS and poor appetite as evidenced by oral intake <75% of estimated needs for 2 week hospitalization, mild temporal region depletion, and mild/moderate edema    INTERVENTIONS  Implementation  -Nutrition Education: Not appropriate at this time due to patient condition   -Enteral Nutrition - Goal: Vital 1.5 at 50 ml/hr for 22 hours + FWF per MD order  *Hold 1 hour before and after levothyroxine dose  Provides: 1650 kcal, 78 g pro, 206 g CHO, 63 g fat, 6.6 g fiber, 840 ml free water (meets 100% energy needs, 99% protein needs)    Goals  Meet nutritional needs through enteral nutrition     Monitoring/Evaluation  Weights, EN tolerance, BG, electrolytes for refeeding, fluid status, BMs

## 2023-08-10 NOTE — PLAN OF CARE
[facility] - ICU    RN Progress Note:            Pertinent Assessments:      Please refer to flowsheet rows for full assessment     Successfully place NG tube and started free water flush and initiated tube feedings. [place] catheter placed last night for urinary retention, urine is carmen with sediment. PICC changed from single to triple lumen. Pt communicating in longer sentences and more clearly today, still very confused. Moaning at times. Mitts are on as she swings frequently at staff members and pulls at lines. Levo on to maintain MAP goal of 65 and precedex for agitation and restlessness. SR. Afebrile.                 Barriers to Discharge / Downgrade:     ICU status         Point of Contact Update YES-OR-NO: Yes  If No, reason:   Name:Sisterkris Bell  Phone Number:On chart  Summary of Conversation: As mentioned above.        Problem: Plan of Care - These are the overarching goals to be used throughout the patient stay.    Goal: Plan of Care Review  Description: The Plan of Care Review/Shift note should be completed every shift.  The Outcome Evaluation is a brief statement about your assessment that the patient is improving, declining, or no change.  This information will be displayed automatically on your shift note.  Outcome: Progressing   Goal Outcome Evaluation:

## 2023-08-10 NOTE — PROGRESS NOTES
RESPIRATORY CARE NOTE    Patient is on 1 L Oximask, BS diminished, gave Albuterol treatment x1, BS post treatment no change, patient perceives no improvement, patient tolerated well.     Ольга Downing, RT

## 2023-08-10 NOTE — PROGRESS NOTES
Care Management Follow Up    Length of Stay (days): 13    Expected Discharge Date: 08/11/2023     Concerns to be Addressed:       Patient plan of care discussed at interdisciplinary rounds: Yes    Anticipated Discharge Disposition:  return to [facility]      Anticipated Discharge Services:    Anticipated Discharge DME:      Patient/family educated on Medicare website which has current facility and service quality ratings:    Education Provided on the Discharge Plan:    Patient/Family in Agreement with the Plan:      Referrals Placed by CM/SW:    Private pay costs discussed: Not applicable    Additional Information:  Plan to return to [facility] when medically ready     Chest tubes placed 8/8.   Weaning O2  IV abx for 6 weeks, blood cultures pending  Attempted to place NG yesterday, unsuccessful  Patient has periods of agitation.  Soft mitts on as patient has been pulling at lines and oxygen mask    Patient is not at her baseline mental status mental status and does not have decisional capacity.  Sisters involved.  Adult children are not part of patient's life anymore.  Family meeting 8/9, see palliative note.  GOC: Life prolonging with limits.           Ashley Feliz RN

## 2023-08-10 NOTE — PROGRESS NOTES
PALLIATIVE CARE PROGRESS NOTE  [facility]  Palliative Care Chart Check Note    Palliative medicine was consulted for goals of care, support to family.    Chart reviewed and recent events noted from preceding 24 hours.  Noted difficulty with NG insertion yesterday, success earlier today.  Trial of treatment with enteral nutrition and lactulose has begun.  Receiving opioids for acute/chronic pain (on chronic opioids as outpatient) and resumed psychiatric medication.       Goals of care: life prolonging with limits.    Advanced care planning:  No health care directive on file. Per  informed consent policy, next of kin should be involved if patient becomes unable.  There is no POLST form on file, defer to patient and/or next of kin for decisions .  Would recommend POLST completion prior to discharge with DNR/DNI status.  Code status: No CPR- Do NOT Intubate    Support:  Family: one of eight children, not close to surviving brother Raleigh, and not close to daughter Shereen (15 yrs since last visit); lost custody of son Abhi when he was 15 yrs old.  Sisters:   Sindy Sosarashida: [phone]  Annelise Vitaly: [phone] (primary contact)  Mamie Chi: [phone]  Elaine Chi: [phone]  Friends (limited to social media for most part)  Julianna community: No Mu-ism , family note she has not been spiritual.     Viviane Hernandez MD  [facility], Palliative Care  Securely message with the [employer] Web Console (learn more here) or  Text page via [email or web address] Paging/Directory            Current Problem List:   Principal Problem:    Community acquired pneumonia of left lower lobe of lung  Active Problems:    Left leg cellulitis    Altered mental status, unspecified altered mental status type    Sepsis, due to unspecified organism, unspecified whether acute organ dysfunction present (H)    DK (acute kidney injury) (H)    Type 2 diabetes mellitus with hyperglycemia, with long-term  current use of insulin (H)    Gram-positive bacteremia    Acute kidney failure, unspecified (H)      Allergies   Allergen Reactions    Hydroxyzine     Mushroom     Penicillins      Hives      Tamiflu [Oseltamivir]             Data Reviewed:     Last Comprehensive Metabolic Panel:  Lab Results   Component Value Date     (H) 08/10/2023    POTASSIUM 4.2 08/10/2023    CHLORIDE 119 (H) 08/10/2023    CO2 28 08/10/2023    ANIONGAP 8 08/10/2023     (H) 08/10/2023    BUN 23.4 (H) 08/10/2023    CR 0.81 08/10/2023    GFRESTIMATED 79 08/10/2023    SAMMY 8.2 (L) 08/10/2023     CBC RESULTS:   Recent Labs   Lab Test 08/10/23  0335   WBC 9.9   RBC 3.63*   HGB 10.5*   HCT 34.8*   MCV 96   MCH 28.9   MCHC 30.2*   RDW 17.5*        Lab Results   Component Value Date    AST 25 08/10/2023     Lab Results   Component Value Date    ALT <5 08/10/2023     No results found for: BILICONJ   Lab Results   Component Value Date    BILITOTAL 0.5 08/10/2023     Lab Results   Component Value Date    ALBUMIN 2.3 08/10/2023     Lab Results   Component Value Date    PROTTOTAL 7.0 08/10/2023      Lab Results   Component Value Date    ALKPHOS 230 08/10/2023     CXR 8/10/23:   IMPRESSION: Again noted, small bore thoracostomy tube which projects over the lateral aspect of the right lung base. Heart size and pulmonary vasculature are stable. Stable opacification of the right hemithorax with residual atelectasis or infiltrate and    layering pleural effusion. Right upper extremity PICC tip overlies mid SVC level. Minimal streaky left midlung opacities persist. No visible pneumothorax. Partially imaged cervical and thoracolumbar spine fusion hardware. EKG wires and leads projecting   over the chest obscures some details.

## 2023-08-10 NOTE — PROGRESS NOTES
PULMONARY / CRITICAL CARE PROGRESS NOTE    Date / Time of Admission:  7/28/2023 12:25 PM    Assessment:   Principal Problem:    Community acquired pneumonia of left lower lobe of lung  Active Problems:    Left leg cellulitis    Altered mental status, unspecified altered mental status type    Sepsis, due to unspecified organism, unspecified whether acute organ dysfunction present (H)    DK (acute kidney injury) (H)    Type 2 diabetes mellitus with hyperglycemia, with long-term current use of insulin (H)    Gram-positive bacteremia    Acute kidney failure, unspecified (H)      Code Status:  No CPR- Do NOT Intubate    67yoF with MSSA bacteremia, small cervical epidural abscess deemed not necessary for evacuation by neurosurgery, Cirrhosis, bipolar disorder who has now developed large right-sided pleural effusion with worsening hypoxia that is loculated and now s/p chest tube. Worsening septic shock post-chest tube placement requirement vasopressor with significant worsening of encephalopathy.       Plan:   Pulmonary:  1) Loculated pleural effusion not consistent with empyema but complicated effusion.   2) Concern for aspiration  3) Acute hypoxic respiratory failure  -Bipap removed, not very hypercapneic  -Wean O2 as able  -Chest tube with tPA/DNAse. 2L out so far but with persistent RLL  -Repeat CXR daily    C/V:  1) Septic shock likely worsened by fluid shifts while draining effusion  -Norepi for MAP>65  -Albumin scheduled  -holding lasix, already 15L negative.         ID:  1) MSSA bacteremia  2) Cervical epidural abscess too small for intervention  -Cefazolin IV for 6 weeks  -LINO found no endocarditis  -Follow up cultures from pleural fluid.   -WBC normalized.     Renal:  1) Mild DK in setting of sepsis--resolving  2) Hypernatremia, worse  -Albumin scheduled  -Difficult to get her free water. She is not safe to drink, but will administer free water through NG tube.   -No further diuresis.     GI:  1) Cirrhosis  2)  "Malnutrition  -NG placed, at risk for refeeding  -Tube feedings to start  -Lactulose      Neuro:  1) Encephalopathy presumed hepatic and toxic-metabolic  -Precedex for RASS 0 to -1  -Will add PRN fentanyl, she may be withdrawing given her chronic pain  -Resume home psych meds via NG tube    Heme:  No issues    Endo:  1) Hyperglycemia  -Lantus to 50Units at bedtime. Remains NPO. Will hold on increasing further to prevent hypoglycemia      ICU DAILY CHECKLIST                           Can patient transfer out of MICU? no    FAST HUG:    Feeding:  Feeding: No.  Patient is receiving NPO  advancing to tube feeding  [place]:Yes  Analgesia/Sedation:Yes RASS goal 0 to -1  Thromboembolic prophylaxis: Yes; Mode:  Lovenox  HOB>30:  Yes  Stress Ulcer Protocol Active: Yes; Mode: PPI  Glycemic Control: Any glucose > 180 yes; Mode of Insulin Therapy: Sliding Scale Insulin and Long Acting Insulin    Clinically Significant Risk Factors         # Hypernatremia: Highest Na = 155 mmol/L in last 2 days, will monitor as appropriate      # Hypoalbuminemia: Lowest albumin = 2 g/dL at 8/8/2023  6:40 AM, will monitor as appropriate       # Hypertension: Noted on problem list       # DMII: A1C = 8.2 % (Ref range: <5.7 %) within past 6 months     # Obesity: Estimated body mass index is 34.05 kg/m  as calculated from the following:    Height as of this encounter: 1.651 m (5' 5\").    Weight as of this encounter: 92.8 kg (204 lb 9.4 oz).               Restraints? Yes  PROVIDER RESTRAINT FOR NON-VIOLENT BEHAVIOR FACE TO FACE EVALUATION    Patient's Immediate Situation:  Patient demonstrated the following behaviors: pulling at lines    Patient's Reaction to the intervention:  Does patient understand the reason for restraint/seclusion? No     Medical Condition:  Is there any evidence of compromise of Skin integrity, Respiratory, Cardiovascular, Musculoskeletal, Hydration?   No    Behavioral Condition:  In consultation with the RN, is there a need to " continue this restraint or seclusion? Yes    See Restraint Flowsheet for complete restraint documentation and assessment.        Critical Care Time: 55 minutes additional critical care time  This patient is critically ill due to hemodynamic instability requiring vasopressor.     Updated Daughters Sindy and Annelise via phone. Plan on coming this weekend.     Subjective:   HPI:  Ginna Mireles is a 67 year old female with cirrhosis (?ETOH remote use), bipolar disorder, DMII residing in nursing home with L3-S1 hardware removal and posterior fusion T10-L3 January 2023 presented 7/28 with fever and altered mental status. She was found to have MSSA bacteremia. Her mental status has continued to wax and wane. She underwent LINO that found no vegetations. MRI found indeterminate epidural fluid collection in lower cervical spine. Neurosurgery was consulted and did not feel intervention warranted. Her T11 end-plate deformity had increased since imaging April 2023 (done by [facility]). They were aware of this finding in April. She was fitted with TLSO brace for when out of bed.      She had an increase in oxygen requirements yesterday and chest Xray shows large right-sided effusion.      After attempted thoracentesis, patient with increasing respiratory distress, abdominal breathing. ABG ok, patient still responsive but increased O2 requirement. I called radiology to expedite chest tube placement which they kindly placed. Only 200cc out from initial placement but overnight put out over 2L and became hypotensive.      Increasingly agitated overnight. Requiring increase in Precedex and Risperidone.     Principal Problem:    Community acquired pneumonia of left lower lobe of lung  Active Problems:    Left leg cellulitis    Altered mental status, unspecified altered mental status type    Sepsis, due to unspecified organism, unspecified whether acute organ dysfunction present (H)    DK (acute kidney injury) (H)    Type 2 diabetes  "mellitus with hyperglycemia, with long-term current use of insulin (H)    Gram-positive bacteremia    Acute kidney failure, unspecified (H)      Allergies: Hydroxyzine, Mushroom, Penicillins, and Tamiflu [oseltamivir]     MEDS:  Scheduled Meds:   albumin human  12.5 g Intravenous Q8H NICHOLAS    albuterol  2.5 mg Nebulization Q6H    alteplase (ACTIVASE) 10 mg, dornase guanaco (PULMOZYME) 5 mg in sodium chloride 0.9 % 50 mL for chest tube instillation in syringe  50 mL Chest Tube BID    ceFAZolin  2 g Intravenous Q8H    [Held by provider] DULoxetine  30 mg Oral Daily    enoxaparin ANTICOAGULANT  40 mg Subcutaneous Q24H    [Held by provider] furosemide  40 mg Intravenous Q12H    insulin aspart  1-10 Units Subcutaneous Q4H NICHOLAS    insulin glargine  50 Units Subcutaneous At Bedtime    lamoTRIgine  200 mg Oral BID    levothyroxine  137 mcg Oral Daily    [Held by provider] lisinopril  2.5 mg Oral Daily    miconazole   Topical BID    multivitamin w/minerals  1 tablet Oral Daily    [Held by provider] OLANZapine  2.5 mg Oral At Bedtime    pantoprazole  40 mg Oral QAM AC    sodium chloride (PF)  3 mL Intracatheter Q8H     Continuous Infusions:   dexmedeTOMIDine 1 mcg/kg/hr (08/10/23 0615)    norepinephrine 0.07 mcg/kg/min (08/10/23 0600)     PRN Meds:.acetaminophen, acetaminophen, bisacodyl **OR** bisacodyl, glucose **OR** dextrose **OR** glucagon, glucose **OR** dextrose **OR** glucagon, insulin aspart, lidocaine 4%, lidocaine (buffered or not buffered), melatonin, naloxone **OR** naloxone **OR** naloxone **OR** naloxone, ondansetron **OR** ondansetron, oxyCODONE IR, prochlorperazine **OR** prochlorperazine **OR** prochlorperazine, sodium chloride (PF), sodium chloride (PF)      Objective:   VITALS:  /58   Pulse 80   Temp 99.1  F (37.3  C) (Axillary)   Resp 24   Ht 1.651 m (5' 5\")   Wt 92.8 kg (204 lb 9.4 oz)   SpO2 93%   BMI 34.05 kg/m    EXAM:  General appearance: appears older than stated age and disheveled, " agitated  Neck: no adenopathy and supple, symmetrical, trachea midline  Lungs:  decreased breath sounds on right.   Heart: RRR, S1 and S2  Abdomen: soft, non-tender; bowel sounds normal; no masses,  no organomegaly  Extremities: pitting edema bilaterally  Skin: Skin color, texture, turgor normal. No rashes or lesions  Neurologic: jerking movements with arms, moaning, is responding in sentences.     I&O:       Intake/Output Summary (Last 24 hours) at 8/10/2023 0728  Last data filed at 8/10/2023 0635  Gross per 24 hour   Intake 1667.48 ml   Output 2334 ml   Net -666.52 ml         Data Review:  Chest Xray  Personally reviewed image/s and Personally reviewed impression/s  EXAM: XR CHEST PORT 1 VIEW  LOCATION: [facility]  DATE: 8/10/2023     INDICATION: Chest tube placement  COMPARISON: None.                                                                      IMPRESSION: Again noted, small bore thoracostomy tube which projects over the lateral aspect of the right lung base. Heart size and pulmonary vasculature are stable. Stable opacification of the right hemithorax with residual atelectasis or infiltrate and   layering pleural effusion. Right upper extremity PICC tip overlies mid SVC level. Minimal streaky left midlung opacities persist. No visible pneumothorax. Partially imaged cervical and thoracolumbar spine fusion hardware. EKG wires and leads projecting   over the chest obscures some details.      LABS      Latest Ref Rng & Units 8/5/2023     5:36 AM 8/6/2023     5:43 AM 8/7/2023     5:23 AM 8/8/2023     6:40 AM 8/9/2023    12:19 AM 8/9/2023     5:45 AM 8/10/2023     3:35 AM   Glucose Values   Glucose 70 - 99 mg/dL 260  239  243  242  288  271  138        Results for orders placed or performed during the hospital encounter of 07/28/23   Basic metabolic panel   Result Value Ref Range    Sodium 150 (H) 136 - 145 mmol/L    Potassium 4.7 3.4 - 5.3 mmol/L    Chloride 115 (H) 98 - 107 mmol/L     Carbon Dioxide (CO2) 27 22 - 29 mmol/L    Anion Gap 8 7 - 15 mmol/L    Urea Nitrogen 25.6 (H) 8.0 - 23.0 mg/dL    Creatinine 0.94 0.51 - 0.95 mg/dL    Calcium 8.0 (L) 8.8 - 10.2 mg/dL    Glucose 288 (H) 70 - 99 mg/dL    GFR Estimate 66 >60 mL/min/1.73m2     Lab Results   Component Value Date    WBC 9.9 08/10/2023    HGB 10.5 (L) 08/10/2023    HCT 34.8 (L) 08/10/2023    MCV 96 08/10/2023     08/10/2023       ABG:  Recent Labs   Lab 08/09/23  0724 08/08/23  1528   PH 7.37 7.36*   PCO2 52* 53*   PO2 134* 84   HCO3 30* 30*         By:  Chrissie Westbrook MD  Pulmonary/Critical Care

## 2023-08-10 NOTE — PROCEDURES
"PICC Line Insertion Procedure Note  Pt. Name: Ginna Mireles  MRN:        [id number]    Procedure: Insertion of a  triple Lumen  5 fr  Bard SOLO (valved) Power PICC, Lot number [id number]    Indications: Vascular access    Contraindications : none    Procedure Details     Patient identified with 2 identifiers and \"Time Out\" conducted.  .     Central line insertion bundle followed: hand hygiene performed prior to procedure, site cleansed with cholraprep, hat, mask, sterile gloves, sterile gown worn, patient draped with maximum barrier head to toe drape, sterile field maintained.    The vein was assessed and found to be compressible and of adequate size.     Lidocaine 1% 2 ml administered sq to the insertion site. A 5 Fr PICC was inserted into the basilic vein of the right arm with ultrasound guidance. 1 attempt(s) required to access vein.   Catheter threaded without difficulty. Good blood return noted.    Modified Seldinger Technique used for insertion.    The 8 sharps that are included in the PICC insertion kit were accounted for and disposed of in the sharps container prior to breakdown of the sterile field.    Catheter secured with Statlock, biopatch and Tegaderm dressing applied.    Findings:    Total catheter length  40 cm, with 0 cm exposed. Mid upper arm circumference is 41 cm. Catheter was flushed with 30 cc NS. Patient  tolerated procedure well.    Tip placement verified by 3CG technology . Tip placement in the SVC/RA junction.    CLABSI prevention brochure left at bedside.    Patient's primary RN notified PICC is ready for use.      Comments:        Dylan JORDAN,RN,VA-BC  Vascular Access - [facility]      "

## 2023-08-10 NOTE — PROGRESS NOTES
"[facility] Progress Note    SUBJECTIVE: Per Dr. Westbrook more awake.  Not very interactive during my visit.  Volume up.  Chest tube in.  NG just placed too.         REVIEW OF SYSTEMS:  Negative unless as listed above.  Social history, Family history, Medications: reviewed.    OBJECTIVE:  BP 96/50   Pulse 77   Temp 99.1  F (37.3  C) (Axillary)   Resp 24   Ht 1.651 m (5' 5\")   Wt 92.8 kg (204 lb 9.4 oz)   SpO2 95%   BMI 34.05 kg/m                PHYSICAL EXAM:  More alert  VSS  Chest tube in  Cv reg  Line ok  No rash  Belly soft      Antibiotics:ancef    Pertinent labs:   Latest Reference Range & Units 08/10/23 03:35   WBC 4.0 - 11.0 10e3/uL 9.9   Hemoglobin 11.7 - 15.7 g/dL 10.5 (L)   Hematocrit 35.0 - 47.0 % 34.8 (L)   Platelet Count 150 - 450 10e3/uL 217   RBC Count 3.80 - 5.20 10e6/uL 3.63 (L)   MCV 78 - 100 fL 96   MCH 26.5 - 33.0 pg 28.9   MCHC 31.5 - 36.5 g/dL 30.2 (L)   RDW 10.0 - 15.0 % 17.5 (H)   (L): Data is abnormally low  (H): Data is abnormally high  Last Comprehensive Metabolic Panel:  Sodium   Date Value Ref Range Status   08/10/2023 155 (H) 136 - 145 mmol/L Final     Potassium   Date Value Ref Range Status   08/10/2023 4.2 3.4 - 5.3 mmol/L Final     Chloride   Date Value Ref Range Status   08/10/2023 119 (H) 98 - 107 mmol/L Final     Carbon Dioxide (CO2)   Date Value Ref Range Status   08/10/2023 28 22 - 29 mmol/L Final     Anion Gap   Date Value Ref Range Status   08/10/2023 8 7 - 15 mmol/L Final     GLUCOSE BY METER POCT   Date Value Ref Range Status   08/10/2023 84 70 - 99 mg/dL Final     Urea Nitrogen   Date Value Ref Range Status   08/10/2023 23.4 (H) 8.0 - 23.0 mg/dL Final     Creatinine   Date Value Ref Range Status   08/10/2023 0.81 0.51 - 0.95 mg/dL Final     GFR Estimate   Date Value Ref Range Status   08/10/2023 79 >60 mL/min/1.73m2 Final     Calcium   Date Value Ref Range Status   08/10/2023 8.2 (L) 8.8 - 10.2 mg/dL Final       Liver Function Studies -   Recent Labs "   Lab Test 08/02/23  0541   PROTTOTAL 6.8   ALBUMIN 2.3*   BILITOTAL 0.6   ALKPHOS 176*   AST 41   ALT 17       Erythrocyte Sedimentation Rate   Date Value Ref Range Status   07/03/2019 47 (H) 0 - 20 mm/hr Final       CRP   Date Value Ref Range Status   07/03/2019 2.4 (H) 0.0 - 0.8 mg/dL Final               MICROBIOLOGY DATA:    Aug 1 micro to date neg, prior with 6 consecutive + BC    IMAGING/RADIOLOGY:    IMPRESSION:  THORACIC SPINE MRI:  1.  Sterility indeterminate dorsal epidural fluid collection in the lower cervical/proximal thoracic spine could reflect abscess in the setting of infection. This results in dorsal cord flattening and mild canal stenosis in the proximal thoracic spine.  2.  Chronic T11 superior endplate deformity with increased widening of the T10-T11 intervertebral space since 04/05/2023. This could be related to chronic motion/junctional arthropathy at this level and as widening could reflect instability neurosurgical   consultation is recommended. Infection at this level is felt less as there is relative paucity of adjacent marrow edema and prevertebral soft tissue swelling.  3.  The thoracic thecal sac appears diffusely small with diffuse epidural collection.     LUMBAR SPINE MRI:  1.  The lumbar spine is not well assessed due to artifact.      ASSESSMENT:  MSSA bacteremia hi grade  Back pain  Clean LINO  MRI above    RECOMMENDATION:  Blood cleared  PICC in  Abx to about sept 20 or so  I put in orders  Very long course ahead.  Will follow.    HI Enriquez MD  Office [phone] option 2 to desk staff

## 2023-08-10 NOTE — PLAN OF CARE
Goal Outcome Evaluation:  [facility] - ICU    RN Progress Note:            Pertinent Assessments:      Please refer to flowsheet rows for full assessment     AMS, confused and incoherent. Restless and agitated at times. Not answering questions or following commands. Hypotensive. 1L oxymask most of the night with stable SpO2 at 93-95%.            Key Events - This Shift:     Attempts to place NG tube unsuccessful as pt became very agitated. New sol placed this AM for retention. Continued on precedex gtt to maintain RASS goal. Levophed gtt continued to keep map>65. Serosanguinous output in chest tube drainage.              Barriers to Discharge / Downgrade:     Chest tube in place, AMS.

## 2023-08-10 NOTE — PROGRESS NOTES
Patient sleeping comfortably, on RA, SPO2 96%.  Removed BIPAP from room.    Samijaylen Daniel, RT

## 2023-08-11 NOTE — PROGRESS NOTES
"ICU Update:    CT scan reviewed and chest tube not in pleural space. Removed. No plans for replacement.    Given lack of fever/WBC although there is infiltrate in right lung, will not broaden antibiotics currently. No evidence of empyema.    Remains very encephalopathic despite lactulose and improvement in sodium.    Called Annelise and Sindy to provide update. They are located in [place], [place]. No plans to come today unfortunately.    Explained she is more agitated and uncomfortable than yesterday. No improvement despite our interventions.    Recommended consideration of comfort care tomorrow if still not improved. They told me initially they were planning on coming \"over the weekend\" but today informed me that it would not be until Sunday. I stated that should she become increasingly uncomfortable a transition to comfort should occur sooner than Sunday as I do not want her to suffer longer than necessary if recovery is not possible. I also stated that the longer we do not see meaningful recovery, the less likely it will occur. Sister described situation as \"bleak\" and I would agree.    Chrissie Westbrook MD    "

## 2023-08-11 NOTE — PROGRESS NOTES
Patient's sisters Annelise and Sindy called for an update, they were given the login information to see the patient on IPAD, unable to come in and visit today. Dr. Westbrook was notified they called and will call them when available.

## 2023-08-11 NOTE — PLAN OF CARE
"[facility] - ICU    RN Progress Note:            Pertinent Assessments:      Please refer to flowsheet rows for full assessment     Patient yelling out in pain at times, \"my back hurts, let me go home.\" Given oxycodone, tylenol and fentanyl for pain. Rass goal 0,-1 and she has been anywhere but +3 to -2. Repositioning is very painful, medicated first. 1 BM today, soft brown. Tolerating tube feeding, now up to 40ml/hr goal is 50 which will be at midnight.            Key Events - This Shift:       Sodium now 141, Dr. Westbrook discussed dropping free water intake since ordered 100ml/hr, no order has been placed yet.                Barriers to Discharge / Downgrade:     Comfort care vs hospice, waiting for her sisters to come in and make final decision. Originally were going to be here Saturday, and now they said Sunday.         Point of Contact Update YES-OR-NO: Yes  Name:Gabriel  Phone Number:see facesheet  Summary of Conversation: see Dr. Westbrook note           "

## 2023-08-11 NOTE — PROGRESS NOTES
PULMONARY / CRITICAL CARE PROGRESS NOTE    Date / Time of Admission:  7/28/2023 12:25 PM    Assessment:   Principal Problem:    Community acquired pneumonia of left lower lobe of lung  Active Problems:    Left leg cellulitis    Altered mental status, unspecified altered mental status type    Sepsis, due to unspecified organism, unspecified whether acute organ dysfunction present (H)    DK (acute kidney injury) (H)    Type 2 diabetes mellitus with hyperglycemia, with long-term current use of insulin (H)    Gram-positive bacteremia    Acute kidney failure, unspecified (H)      Code Status:  No CPR- Do NOT Intubate    67yoF with MSSA bacteremia, small cervical epidural abscess deemed not necessary for evacuation by neurosurgery, Cirrhosis, bipolar disorder who has now developed large right-sided pleural effusion with worsening hypoxia that is loculated and now s/p chest tube. Worsening septic shock post-chest tube placement requirement vasopressor with significant worsening of encephalopathy.       Plan:   Pulmonary:  1) Loculated pleural effusion not consistent with empyema but complicated effusion.   2) Concern for aspiration  3) Acute hypoxic respiratory failure  -CT chest as chest tube is nearly out.   -Wean O2 as able  -Chest tube with tPA/DNAse. 2L out so far but with persistent RLL. Unfortunately chest tube displaced overnight.   -Repeat CXR daily    C/V:  1) Septic shock likely worsened by fluid shifts while draining effusion  -Norepi for MAP>65  -Albumin scheduled  -will resume low-dose lasix just to keep Is/Os even.         ID:  1) MSSA bacteremia  2) Cervical epidural abscess too small for intervention  -Cefazolin IV for 6 weeks  -LINO found no endocarditis  -Follow up cultures from pleural fluid. Remains negative. No evidence of empyema.   -WBC normalized.     Renal:  1) Mild DK in setting of sepsis--resolved  2) Hypernatremia, improving with free water  3) Hypervolemic  -Albumin scheduled with lasix.  "  -continue free water via NG.   -No further diuresis.     GI:  1) Cirrhosis  2) Moderate Malnutrition  -she is at risk for refeeding, slowly advancing tube feeding via NG.   -Lactulose for encephalopathy.       Neuro:  1) Encephalopathy presumed hepatic and toxic-metabolic  -Precedex for RASS 0 to -1  -PRN fentanyl, in addition to her home oxycodone  -continue home psych meds via NG tube    Heme:  No issues    Endo:  1) Hyperglycemia  -Lantus to 20Units at bedtime. Will slowly increase as tube feedings increase.       ICU DAILY CHECKLIST                           Can patient transfer out of MICU? no    FAST HUG:    Feeding:  Feeding: Yes.  Patient is receiving tube feeds.   advancing to tube feeding  [place]:Yes  Analgesia/Sedation:Yes RASS goal 0 to -1  Thromboembolic prophylaxis: Yes; Mode:  Lovenox  HOB>30:  Yes  Stress Ulcer Protocol Active: Yes; Mode: PPI  Glycemic Control: Any glucose > 180 yes; Mode of Insulin Therapy: Sliding Scale Insulin and Long Acting Insulin    Clinically Significant Risk Factors         # Hypernatremia: Highest Na = 155 mmol/L in last 2 days, will monitor as appropriate      # Hypoalbuminemia: Lowest albumin = 2 g/dL at 8/8/2023  6:40 AM, will monitor as appropriate       # Hypertension: Noted on problem list       # DMII: A1C = 8.2 % (Ref range: <5.7 %) within past 6 months     # Obesity: Estimated body mass index is 36.58 kg/m  as calculated from the following:    Height as of this encounter: 1.651 m (5' 5\").    Weight as of this encounter: 99.7 kg (219 lb 12.8 oz).   # Moderate Malnutrition: based on nutrition assessment             Restraints? Yes  PROVIDER RESTRAINT FOR NON-VIOLENT BEHAVIOR FACE TO FACE EVALUATION    Patient's Immediate Situation:  Patient demonstrated the following behaviors: pulling at lines    Patient's Reaction to the intervention:  Does patient understand the reason for restraint/seclusion? No     Medical Condition:  Is there any evidence of compromise of Skin " integrity, Respiratory, Cardiovascular, Musculoskeletal, Hydration?   No    Behavioral Condition:  In consultation with the RN, is there a need to continue this restraint or seclusion? Yes    See Restraint Flowsheet for complete restraint documentation and assessment.        Critical Care Time: 45 minutes additional critical care time  This patient is critically ill due to hemodynamic instability requiring vasopressor.     Updated Daughters Sindy and Annelise via phone. Plan on coming this weekend.     Subjective:   HPI:  Ginna Mireles is a 67 year old female with cirrhosis (?ETOH remote use), bipolar disorder, DMII residing in nursing home with L3-S1 hardware removal and posterior fusion T10-L3 January 2023 presented 7/28 with fever and altered mental status. She was found to have MSSA bacteremia. Her mental status has continued to wax and wane. She underwent LINO that found no vegetations. MRI found indeterminate epidural fluid collection in lower cervical spine. Neurosurgery was consulted and did not feel intervention warranted. Her T11 end-plate deformity had increased since imaging April 2023 (done by [facility]). They were aware of this finding in April. She was fitted with TLSO brace for when out of bed.      She had an increase in oxygen requirements yesterday and chest Xray shows large right-sided effusion.      After attempted thoracentesis, patient with increasing respiratory distress, abdominal breathing. ABG ok, patient still responsive but increased O2 requirement. I called radiology to expedite chest tube placement which they kindly placed. Only 200cc out from initial placement but overnight put out over 2L and became hypotensive.      Free water and tube feedings started 8/10. Psych meds resumed. Continues to require precedex. Still on norepinephrine but requirement has decreased.     Principal Problem:    Community acquired pneumonia of left lower lobe of lung  Active Problems:    Left leg cellulitis     Altered mental status, unspecified altered mental status type    Sepsis, due to unspecified organism, unspecified whether acute organ dysfunction present (H)    DK (acute kidney injury) (H)    Type 2 diabetes mellitus with hyperglycemia, with long-term current use of insulin (H)    Gram-positive bacteremia    Acute kidney failure, unspecified (H)      Allergies: Hydroxyzine, Mushroom, Penicillins, and Tamiflu [oseltamivir]     MEDS:  Scheduled Meds:   albumin human  12.5 g Intravenous Q8H NICHOLAS    alteplase (ACTIVASE) 10 mg, dornase guanaco (PULMOZYME) 5 mg in sodium chloride 0.9 % 50 mL for chest tube instillation in syringe  50 mL Chest Tube BID    ceFAZolin  2 g Intravenous Q8H    DULoxetine  30 mg Oral or Feeding Tube Daily    enoxaparin ANTICOAGULANT  40 mg Subcutaneous Q24H    insulin aspart  1-10 Units Subcutaneous Q4H NICHOLAS    insulin glargine  20 Units Subcutaneous At Bedtime    lactulose  20 g Oral or Feeding Tube BID    lamoTRIgine  200 mg Oral or Feeding Tube BID    levothyroxine  137 mcg Oral or Feeding Tube Daily    [Held by provider] lisinopril  2.5 mg Oral Daily    miconazole   Topical BID    multivitamins w/minerals  15 mL Oral Daily    OLANZapine  2.5 mg Oral or Feeding Tube At Bedtime    pantoprazole  40 mg Oral QAM AC    sodium chloride (PF)  3 mL Intracatheter Q8H     Continuous Infusions:   dexmedeTOMIDine 0.9 mcg/kg/hr (08/11/23 0529)    dextrose      norepinephrine (LEVOPHED) 4 mg in D5W 250 mL infusion CENTRAL 0.04 mcg/kg/min (08/11/23 0400)     PRN Meds:.acetaminophen, acetaminophen, bisacodyl **OR** bisacodyl, dextrose, glucose **OR** dextrose **OR** glucagon, fentaNYL, insulin aspart, lidocaine 4%, lidocaine (buffered or not buffered), lidocaine (buffered or not buffered), melatonin, naloxone **OR** naloxone **OR** naloxone **OR** naloxone, ondansetron **OR** ondansetron, oxyCODONE IR, prochlorperazine **OR** prochlorperazine **OR** prochlorperazine, sodium chloride (PF), sodium chloride (PF),  "sodium chloride (PF)      Objective:   VITALS:  /55   Pulse 65   Temp 98.5  F (36.9  C) (Axillary)   Resp 13   Ht 1.651 m (5' 5\")   Wt 99.7 kg (219 lb 12.8 oz)   SpO2 93%   BMI 36.58 kg/m    EXAM:  General appearance: appears older than stated age and disheveled, agitated  Neck: no adenopathy and supple, symmetrical, trachea midline  Lungs:  decreased breath sounds on right.   Heart: RRR, S1 and S2  Abdomen: soft, non-tender; bowel sounds normal; no masses,  no organomegaly  Extremities: pitting edema bilaterally  Skin: Skin color, texture, turgor normal. No rashes or lesions  Neurologic: jerking movements with arms, moaning, is responding in sentences.     I&O:     Intake/Output Summary (Last 24 hours) at 8/11/2023 0752  Last data filed at 8/11/2023 0752  Gross per 24 hour   Intake 4501.06 ml   Output 1300 ml   Net 3201.06 ml             Data Review:  Chest Xray  Personally reviewed image/s and Personally reviewed impression/s  EXAM: XR CHEST PORT 1 VIEW  LOCATION: [facility]  DATE: 8/11/2023     INDICATION: Chest tube placement  COMPARISON: None.                                                                      IMPRESSION: An enteric tube has been placed the tip and side-port extending beyond the field-of-view. A right PICC is unchanged in position. The small bore right-sided chest tube has retracted somewhat from the prior exam, but appears to be still within   the pleural cavity. A moderate right-sided pleural effusion again is seen, unchanged from prior exam no evidence of pneumothorax is appreciated.      LABS      Latest Ref Rng & Units 8/6/2023     5:43 AM 8/7/2023     5:23 AM 8/8/2023     6:40 AM 8/9/2023    12:19 AM 8/9/2023     5:45 AM 8/10/2023     3:35 AM 8/11/2023     4:25 AM   Glucose Values   Glucose 70 - 99 mg/dL 239  243  242  288  271  138  153        Results for orders placed or performed during the hospital encounter of 07/28/23   Basic metabolic panel "   Result Value Ref Range    Sodium 150 (H) 136 - 145 mmol/L    Potassium 4.7 3.4 - 5.3 mmol/L    Chloride 115 (H) 98 - 107 mmol/L    Carbon Dioxide (CO2) 27 22 - 29 mmol/L    Anion Gap 8 7 - 15 mmol/L    Urea Nitrogen 25.6 (H) 8.0 - 23.0 mg/dL    Creatinine 0.94 0.51 - 0.95 mg/dL    Calcium 8.0 (L) 8.8 - 10.2 mg/dL    Glucose 288 (H) 70 - 99 mg/dL    GFR Estimate 66 >60 mL/min/1.73m2     Lab Results   Component Value Date    WBC 7.0 08/11/2023    HGB 9.2 (L) 08/11/2023    HCT 30.7 (L) 08/11/2023    MCV 98 08/11/2023     (L) 08/11/2023       ABG:  Recent Labs   Lab 08/09/23  0724 08/08/23  1528   PH 7.37 7.36*   PCO2 52* 53*   PO2 134* 84   HCO3 30* 30*         By:  Chrissie Westbrook MD  Pulmonary/Critical Care

## 2023-08-11 NOTE — PROGRESS NOTES
"[facility] Progress Note    SUBJECTIVE: Looks comfortable albeit very deconditioned.  Pale color.  No rash.  Lines ok.  Chest tube in.       REVIEW OF SYSTEMS:  Negative unless as listed above.  Social history, Family history, Medications: reviewed.    OBJECTIVE:  BP 97/55   Pulse 65   Temp 98.2  F (36.8  C) (Oral)   Resp 13   Ht 1.651 m (5' 5\")   Wt 99.7 kg (219 lb 12.8 oz)   SpO2 95%   BMI 36.58 kg/m                PHYSICAL EXAM:  More alert  VSS  Chest tube in  Cv reg  Line ok  No rash  Belly soft      Antibiotics:ancef    Pertinent labs:   Latest Reference Range & Units 08/10/23 03:35   WBC 4.0 - 11.0 10e3/uL 9.9   Hemoglobin 11.7 - 15.7 g/dL 10.5 (L)   Hematocrit 35.0 - 47.0 % 34.8 (L)   Platelet Count 150 - 450 10e3/uL 217   RBC Count 3.80 - 5.20 10e6/uL 3.63 (L)   MCV 78 - 100 fL 96   MCH 26.5 - 33.0 pg 28.9   MCHC 31.5 - 36.5 g/dL 30.2 (L)   RDW 10.0 - 15.0 % 17.5 (H)   (L): Data is abnormally low  (H): Data is abnormally high  Last Comprehensive Metabolic Panel:  Sodium   Date Value Ref Range Status   08/11/2023 147 (H) 136 - 145 mmol/L Final     Potassium   Date Value Ref Range Status   08/11/2023 3.9 3.4 - 5.3 mmol/L Final     Chloride   Date Value Ref Range Status   08/11/2023 112 (H) 98 - 107 mmol/L Final     Carbon Dioxide (CO2)   Date Value Ref Range Status   08/11/2023 27 22 - 29 mmol/L Final     Anion Gap   Date Value Ref Range Status   08/11/2023 8 7 - 15 mmol/L Final     Glucose   Date Value Ref Range Status   08/11/2023 153 (H) 70 - 99 mg/dL Final     GLUCOSE BY METER POCT   Date Value Ref Range Status   08/11/2023 101 (H) 70 - 99 mg/dL Final     Urea Nitrogen   Date Value Ref Range Status   08/11/2023 16.7 8.0 - 23.0 mg/dL Final     Creatinine   Date Value Ref Range Status   08/11/2023 0.74 0.51 - 0.95 mg/dL Final     GFR Estimate   Date Value Ref Range Status   08/11/2023 88 >60 mL/min/1.73m2 Final     Calcium   Date Value Ref Range Status   08/11/2023 7.4 (L) 8.8 - 10.2 " mg/dL Final       Liver Function Studies -   Recent Labs   Lab Test 08/02/23  0541   PROTTOTAL 6.8   ALBUMIN 2.3*   BILITOTAL 0.6   ALKPHOS 176*   AST 41   ALT 17       Erythrocyte Sedimentation Rate   Date Value Ref Range Status   07/03/2019 47 (H) 0 - 20 mm/hr Final       CRP   Date Value Ref Range Status   07/03/2019 2.4 (H) 0.0 - 0.8 mg/dL Final               MICROBIOLOGY DATA:    Aug 1 micro to date neg, prior with 6 consecutive + BC    IMAGING/RADIOLOGY:    IMPRESSION:  THORACIC SPINE MRI:  1.  Sterility indeterminate dorsal epidural fluid collection in the lower cervical/proximal thoracic spine could reflect abscess in the setting of infection. This results in dorsal cord flattening and mild canal stenosis in the proximal thoracic spine.  2.  Chronic T11 superior endplate deformity with increased widening of the T10-T11 intervertebral space since 04/05/2023. This could be related to chronic motion/junctional arthropathy at this level and as widening could reflect instability neurosurgical   consultation is recommended. Infection at this level is felt less as there is relative paucity of adjacent marrow edema and prevertebral soft tissue swelling.  3.  The thoracic thecal sac appears diffusely small with diffuse epidural collection.     LUMBAR SPINE MRI:  1.  The lumbar spine is not well assessed due to artifact.      ASSESSMENT:  MSSA bacteremia hi grade  Back pain  Clean LINO  MRI above    RECOMMENDATION:  Blood cleared  PICC in  Abx to about sept 20 or so  I put in orders  Very long course ahead.  Will follow.    We will not round over weekend, please call if needed    HI Enriquez MD  Office [phone] option 2 to desk staff

## 2023-08-11 NOTE — PLAN OF CARE
Problem: Oral Intake Inadequate  Goal: Improved Oral Intake  Outcome: Not Progressing     Problem: Enteral Nutrition  Goal: Absence of Aspiration Signs and Symptoms  Outcome: Progressing  Goal: Safe, Effective Therapy Delivery  Outcome: Progressing  Goal: Feeding Tolerance  Outcome: Progressing     Problem: Malnutrition  Goal: Improved Nutritional Intake  Outcome: Progressing   Goal Outcome Evaluation:    New Findings:    Patient not awake/alert to safely eat.  Tube feeding started 8/10/23 per NG (Dobhoff tube).    Tube feeding gradually increasing to goal rate, currently at 30 ml/hr.   (Goal rate 50 ml/hr x 22 hrs).  Sodium improved with Free water infusing per MD order: 100 ml/hr.  Last BM 8/11.  Wt fluctuations noted with fluid status.  Patient on diuretic.    Chest tube with decreasing output noted.    Plan:  Continue tube feeding for patient's nutrition needs.  MD managing free water.

## 2023-08-11 NOTE — PROGRESS NOTES
[facility]    Medicine Progress Note - Hospitalist Service    Date of Admission:  7/28/2023    Assessment & Plan   Ginna Mireles is a 67 year old female w/PMHx of T2DM on insulin, liver cirrhosis, HTN, HLD, CAD, COPD, remote EtOH use disorder, bipolar disorder, and chronic pain admitted on 7/28/2023 for lethargy and fevers, found to be septic with MSSA bacteremia and unknown source. LINO completed on 8/2/23 and did not show vegetations. MRI lumbar and thoracic with concern for epidural abscess. ID and Neurosurgery do not feel this requires surgical intervention. Will require prolonged course of outpatient IV antibiotics. Patient noted to have increasing oxygen needs, initially felt to be secondary to fluid overloaded status. Diuresed with some improvement but ongoing need for supplemental oxygen. Chest XR 8/6/23 revealed large partially loculated pleural effusion on the right. Pulmonology consulted and thoracentesis ordered. Patient intermittently refused interventions. After multiple attempts at reaching family, spoke with them on 8/7/23 at which time they state that in her current state Ginna does not have decisional capacity. They would like to proceed with thoracentesis. Thoracentesis 8/8/23 with only minimal fluid drained due to numerous septations and loculations. No improvement to respiratory distress following intervention. Chest tube placed on 8/8/23 and patient subsequently developed worsening respiratory distress and hypotension requiring pressors and transfer to ICU. ICU is currently primary team. Palliative now involved.            MSSA bacteremia  LINO without vegetations   MRI with concern for small epidural abscess  Loculated Pleural Effusion of Right Lung s/p thoracentesis and chest tube placement   Acute metabolic encephalopathy   Patient presented with fevers of 103 degrees and AMS. She is usually communicative and uses a walker at baseline; found down in facility. Positive  blood culture x 4 days concerning for high grade MSSA bacteremia. TTE performed 7/29 did not show signs of vegetations. MRI lumbar and thoracic did reveal area of fluid collection concerning for possible epidural abscess, but ID and neurosurgery agree that this is not an operative infection. Patient with gradually increasing oxygen needs, chest XR obtained on 8/5/23 showed large loculated pleural effusion on the right. Pulmonology consulted and agree with thoracentesis. Thoracentesis performed on 8/8/2 with only minimal fluid out given extensive loculations. Chest tube therefore placed 8/8/23 with clinical deterioration thereafter. Transferred to the ICU for increasing oxygen needs and hypotension requiring pressors. ICU now primary.               - ID consulted               - Neurosurg consulted               - Pulm consulted                           - LINO completed 8/2/23 and negative for vegetations               - MRI lumbar and thoracic with concerns for small epidural abscess, non-operative               - MRI cervical and head not obtained, patient refused 2/2 pain               - Chest XR shows pleural effusion concerning for worsening pneumonia                          - Thoracentesis 8/8/23 with only minimal fluid                          - Chest tube placement 8/9/23 with worsening respiratory status thereafter, transferred to ICU   - Continue cefazolin 2g Q8H              - Neurosurg fitted with TLSO brace   - ICU now managing      Increasing Oxygen Needs   Pleural Effusion   Patient initially admitted for sepsis, concern for pneumonia as source as imaging on admission revealed consolidation in the right lower lobe in addition to small right pleural effusion. Has been on broad spectrum abx. Patient with slowly increasing oxygen needs coinciding with aggressive fluids for hypernatremia, see above. Some improvement with diuresis. Chest imaging showed loculated pleural effusion. Worsening respiratory  status following above interventions. Chest imaging continues to show pleural effusion.               - ICU managing      Decision Making Capacity    Code Status  Discussed with sisters on 8/7/23. They visited the patient and feel she is not at her baseline mental status and does not have decisional capacity to refuse diagnostic or therapeutic interventions. Reportedly sister Sindy is decision maker. Per chart review, ICU team has contacted patient about current status. Planning for goals of care discussion.               - Palliative consulted      Hypernatremia  Post-Obstructive Diuresis   Treatment Complicated by Fluid Status   Patient noted to have sodium levels of 150+ following admission. Felt to most likely be secondary to dry fluid status in the setting of initial urinary retention and significant post-operative diuresis. Hypernatremia improved with aggressive hypotonic maintenance fluids, but fluids discontinued in the setting of worsening edema and shortness of breath. SOB improved with furosemide, however sodium increased to 153. Will hold on further diuresis for now but will have to be thoughtful about continued maintenance fluids in the setting of increasing respiratory distress and newly identified worsening pleural effusion, see below.    - ICU currently managing      Type II Diabetes   Patient with known diagnosis of type II diabetes. She did have an AGMA on admission, but felt to be more likely 2/2 lactic acidosis. Home diabetes regimen is as follows: 34 units lantus HS + 13 units aspart at breakfast + 14 units aspart at lunch + 34 aspart at dinner + metformin + bydureon + canagliflozin. Hemoglobin A1c on this admission is 8.2. Sugars have continued to be difficult to control.              - Hold PTA metformin, bydureon, canagliflozin.               - 42 units lantus HS               - 1:5 carb counting               - Very high resistance sliding scale regimen               - ICU to alter above  regimen as see fit      RLQ pain   H/o cirrhosis 2/2 EtOH use disorder   Transaminitis, improving   Per chart review, patient has remote h/o EtOH use disorder, likely causing cirrhosis. Patient endorsed pain in RLQ  on admission. CT abdomen also did not show ascites, which is reassuring.   - CMP daily  - ICU managing      DK - resolved   Cr 1.34 on admission, likely prerenal in setting of sepsis discussed above. Creatinine normalized.              - Daily CMP               - ICU managing      Hypokalemia  - RN replacement protocol     Elevated trops  Trop 37 and 35 on repeat on admission. Likely demand ischemia in setting of infection discussed above. EKG on admission w/o acute ischemic changes.      Constipation  - Scheduled miralax daily   - Senna PRN  - ICU managing      Chronic Conditions:  HTN   - Continue PTA lisinopril 5mg daily   - Hold PTA hydrochlorothiazide 50mg daily      HLD  - Hold PTA pravastatin     Bipolar disorder    PTA meds include: lamotrigine 200mg PO BID, olanzapine 2.5mg PO daily, duloxetine 30mg PO daily, mirtazapine 45mg PO daily, rexulti 4mg PO daily               - ICU managing      Chronic pain  PTA regimen includes: Tylenol 500mg QID PRN, oxycodone 10mg Q4H PRN, flexeril 10mg TID PRN, tizanidine 2mg BID Q6H PRN              - ICU managing      Acquired hypothyroidism  - Continue PTA synthroid     Diet: NPO for Medical/Clinical Reasons Except for: No Exceptions  Adult Formula Drip Feeding: Continuous Vital 1.5; Nasogastric tube; Goal Rate: 50 ml/hr x 22 hrs (Hold 1 hr before and after levothyroxine); mL/hr; start at 10 ml/hr and increase 10 ml every 8 hrs to goal as tolerates    DVT Prophylaxis: Enoxaparin (Lovenox) SQ  [place] Catheter: PRESENT, indication: Retention;Obstruction, Retention  Lines: PRESENT      PICC 08/10/23 Triple Lumen Right Basilic Vascular Access-Site Assessment: WDL except;Ecchymotic    Cardiac Monitoring: None  Code Status: No CPR- Do NOT Intubate      Clinically  "Significant Risk Factors         # Hypernatremia: Highest Na = 155 mmol/L in last 2 days, will monitor as appropriate      # Hypoalbuminemia: Lowest albumin = 2 g/dL at 8/8/2023  6:40 AM, will monitor as appropriate           # Hypertension: Noted on problem list       # DMII: A1C = 8.2 % (Ref range: <5.7 %) within past 6 months   # Obesity: Estimated body mass index is 36.58 kg/m  as calculated from the following:    Height as of this encounter: 1.651 m (5' 5\").    Weight as of this encounter: 99.7 kg (219 lb 12.8 oz).     # Moderate Malnutrition: based on nutrition assessment         The patient's care was discussed with Dr Rosenstein Sarah Rajala, MD PGY-2  [facility]    ______________________________________________________________________    Interval History   Patient underwent chest tube placement yesterday with clinical deterioration overnight. Appears she became hypotensive to the point of needing pressors. Respiratory status further declined. Ultimately transferred to the ICU.     Physical Exam   Vital Signs: Temp: 98.2  F (36.8  C) Temp src: Oral BP: 97/55 Pulse: 65   Resp: 13 SpO2: 95 % O2 Device: Oxymask Oxygen Delivery: 3 LPM  Weight: 219 lbs 12.78 oz  GENERAL: Calm, does not open eyes to my voice.   EYES: Unable to examen.     RESP:  Oxymask in place. Poor air movement.   CV: Systolic murmur heard over right sternal border, radiated to clavicle. No murmur heard of mitral position. Regular rate.   Abdomen: Soft. Patient unable to endorse pain or tenderness.   MSK: Bilateral LE edematous.   SKIN: Dorsal surface of left foot with atypical ecchymosis across toes 2-4. Bilateral palmar surfaces of hands diffusely erythematous but non-tender.     Data     I have personally reviewed the following data over the past 24 hrs:    7.0  \   9.2 (L)   / 144 (L)     147 (H) 112 (H) 16.7 /  101 (H)   3.9 27 0.74 \     ALT: 6 AST: 46 (H) AP: 369 (H) TBILI: 0.7   ALB: 2.5 (L) TOT PROTEIN: 6.5 " LIPASE: N/A       Imaging results reviewed over the past 24 hrs:   Recent Results (from the past 24 hour(s))   XR Abdomen Port 1 View    Narrative    EXAM: XR ABDOMEN PORT 1 VIEW  LOCATION: [facility]  DATE: 8/10/2023    INDICATION: feeding tube placement  COMPARISON: None.      Impression    IMPRESSION: A feeding tube tip is located within the stomach, near the midline. No dilated loops of bowel. Extensive spinal fusion hardware. The thorax is evaluated on same-day chest x-ray.   XR Chest Port 1 View    Narrative    EXAM: XR CHEST PORT 1 VIEW  LOCATION: [facility]  DATE: 8/11/2023    INDICATION: Chest tube placement  COMPARISON: None.      Impression    IMPRESSION: An enteric tube has been placed the tip and side-port extending beyond the field-of-view. A right PICC is unchanged in position. The small bore right-sided chest tube has retracted somewhat from the prior exam, but appears to be still within   the pleural cavity. A moderate right-sided pleural effusion again is seen, unchanged from prior exam no evidence of pneumothorax is appreciated.

## 2023-08-12 NOTE — PROGRESS NOTES
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 Found to have MSSA bloodstream infection, suspected left lower extremity cellulitis, right lower lobe pneumonia & loculated parapneumonic effusion requiring chest tube drainage (displaced & removed 8/11/23), acute hypoxemic respiratory failure, septic shock, small cervical epidural abscess deemed not necessary for evacuation by neurosurgery, & metabolic encephalopathy with agitation. Serial goals of care discussions with surrogate decision makers - sister Annelise & Sindy - indicated that Ginna would want to be made comfortable. Currently, continuing norepinephrine, cefazolin, & nutrition while escalating opioid pain medications; plan to pursue full comfort care upon sisters' arrival Sunday 8/13/23.     Interval Events:   - Ongoing agitation, pain in the back distributed from cervical to lumbar spine   - Afebrile, NE 0.04, HR 70s, 3 LPM O2. UO 1.3 L, net +daily (5 L) previous 3 days (-7L since admission). No stools previous 28 hours on lactulose 20 bid   - Corrected Ca 8.3,  (peak yesterday 369, other labs WNL),  with advanced TF. Hgb 8.5 (down drifting), plts 111 (downdrifting). 8/11 CT ongoing RLL consolidation & pleural effusion, tube in chest wall     Assessment & Plan:   Goals of Care:  DNR, DNI  Sisters are surrogate decision makers. Estranged from other family & friends. No POLST on file. [place] Annelise & [place] plan to arrive Sunday. 8/12/23 called sister regarding update & to discuss Ginna's preference; plan to shift to more aggressive pain control, eliminate cares causing any discomfort, & continue NE/ABX at this time.   - palliative was consulted     Neurology, Psychiatry, Sedation,  Analgesia:  Acute metabolic encephalopathy   Suspected etiologies include sepsis & hepatic, at least     Epidural fluid collection concerning for abscess   MRI spine demonstrated  sterility indeterminate dorsal epidural fluid collection in the lower cervical/proximal thoracic spine could reflect abscess in the setting of infection.    - NSG consulted, no intervention indicated     Bipolar disorder  - pta duloxetine   - lamotrigine   - olanzapine 2.5 mg at bedtime     Sedation & analgesia   - low dose precedex   - hydromorphone 0.2-0.5 mg iv q2h prn for severe pain   - oxycodone 10mg q6h     Cardiovascular:  Septic shock   Evinced by infection, hypotension despite IVF, preserved biventricular function on echocardiograms, & low suspicion for obstruction phenomena   - low dose NE     PFO with spontaneous L to R shunt     No vegetations on 8/2/23 LINO     Respiratory:  Acute hypoxemic respiratory failure   Suspected aspiration or community acquired pneumonia   Right, loculated parapneumonic effusion s/p chest tube placement   - 8/10-8/11 chest tube displaced, 8/11 CT chest demonstrated ongoing RLL consolidation & pleural effusion, tube in chest wall    - ABX as below     Gastrointestinal:  Decompensated cirrhosis   Etiology unclear. Evinced by sequelae of portal HTN on imaging including splenomegaly & varices in conjunction with suspected HE.    Protein calorie malnutrition, at risk of re-feeding syndrome     Renal, Acid-base, Electrolytes:  Non-oliguric DK - resolved     Hypervolemia     Hypernatremia - resolved     Infectious Disease:  MSSA bloodstream infection   LINO negative for vegetations   - ID consulted   - cefazolin for 6 weeks duration    Suspected left lower extremity cellulitis   Aspiration or community acquired pneumonia - resolved     Hematology, Oncology:  Anemia, chronic   Thrombocytopenia  Coagulopathy     Endocrine:  T2DM  - MDSSI   - glargine 20 units qhs     Hypothyroidism  - pta levothyroxine      Checklist:  FEN: TFs  VTE ppx: discontinue for comfort focus   GI ppx: NA   Bowel regimen: Not ordered   Lines/tube-size: PICC (8/10), right chest tube (8/9), sol (8/10), FT  Code Status: DNR, DNI    Physical Exam:  Neurologic: Off sedation. Opens eyes & tracks; inconsistently follows commands in extremities. Spontaneous movement throughout..Withdrawals to noxious stimuli throughout. Moaning & appear uncomfortable.   HEENT: Head and face normal. No nasal discharge. Oropharynx normal. Eyelids, conjunctiva, & sclera normal.   Respiratory: Lungs clear bilaterally. No wheezes, crackles, or rhonchi.   Cardiovascular: Regular rate & rhythm  Normal S1 & S2.   Gastrointestinal: Distended. Non-tender.     Musculoskeletal: Skeletal configuration normal and muscle mass normal for age. Joint appearance overall normal.  Lymphatics: No lymphadenopathy  Skin, Hair, & Nails: Skin color normal. Anasarca     All pertinent vital signs, ventilator settings, I&Os, laboratory, microbiology, ECGs, & imaging data has been personally reviewed  .   Total Critical Care time, excluding procedures, was 40 minutes    ANNE Ross MD  Critical Care Medicine

## 2023-08-12 NOTE — PLAN OF CARE
Problem: Plan of Care - These are the overarching goals to be used throughout the patient stay.    Goal: Optimal Comfort and Wellbeing  Outcome: Progressing  Intervention: Monitor Pain and Promote Comfort  Recent Flowsheet Documentation  Taken 8/11/2023 2155 by Martha Farah RN  Pain Management Interventions: (premedicated for turning) MD notified (comment)     Problem: Restraint, Nonviolent  Goal: Absence of Harm or Injury  8/12/2023 0644 by Martha Farah RN  Outcome: Progressing  8/12/2023 0644 by Martha Farah RN  Outcome: Progressing  Intervention: Protect Skin and Joint Integrity  Recent Flowsheet Documentation  Taken 8/12/2023 0400 by Martha Farah RN  Body Position:   turned   supine  Taken 8/12/2023 0200 by Martha Farah RN  Body Position:   turned   left  Taken 8/12/2023 0000 by Martha Farah RN  Body Position:   turned   right  Taken 8/11/2023 2200 by Martha Farah RN  Body Position:   turned   left  Taken 8/11/2023 2000 by Martha Farah RN  Body Position:   turned   supine     Problem: Enteral Nutrition  Goal: Absence of Aspiration Signs and Symptoms  8/12/2023 0644 by Martha Farah RN  Outcome: Progressing  8/12/2023 0644 by Martha Farah RN  Outcome: Progressing  Intervention: Minimize Aspiration Risk  Recent Flowsheet Documentation  Taken 8/12/2023 0400 by Martha Farah RN  Head of Bed (HOB) Positioning: HOB at 30 degrees  Taken 8/12/2023 0200 by Martha Farah RN  Head of Bed (HOB) Positioning: HOB at 20-30 degrees  Taken 8/12/2023 0000 by Martha Farah RN  Oral Care: patient refused intervention  Head of Bed (HOB) Positioning: HOB at 30 degrees  Taken 8/11/2023 2200 by Martha Farah RN  Head of Bed (HOB) Positioning: HOB at 30 degrees  Taken 8/11/2023 2000 by Martha Farah RN  Head of Bed (HOB) Positioning: HOB at 30 degrees  Goal: Safe, Effective Therapy Delivery  Outcome: Progressing  Goal: Feeding Tolerance  8/12/2023 0644 by Martha Farah  RN  Outcome: Progressing  8/12/2023 0644 by Martha Farah RN  Outcome: Progressing     Problem: Malnutrition  Goal: Improved Nutritional Intake  8/12/2023 0644 by Martha Farah RN  Outcome: Progressing  8/12/2023 0644 by Martha Farah RN  Outcome: Progressing   Goal Outcome Evaluation:             [facility] - ICU    RN Progress Note:            Pertinent Assessments:      Please refer to flowsheet rows for full assessment     Pt tolerating tube feedings. Vitals stable. Painful with any turning/repositioning. Agitated at times.           Key Events - This Shift:   Fentanyl iv given for pain(see MAR)prn. Precedex drip increased due to agitation.          Barriers to Discharge / Downgrade:     Continues to require levophed drip to maintain MAP >65         Martha Cummins RN

## 2023-08-12 NOTE — PROGRESS NOTES
Care Management Follow Up    Length of Stay (days): 15    Expected Discharge Date: 08/14/2023     Concerns to be Addressed:     Care Progression/ Discharge planning  Patient plan of care discussed at interdisciplinary rounds: Yes    Anticipated Discharge Disposition:  TBD - from [facility]     Anticipated Discharge Services:  NA  Anticipated Discharge DME:  [facility]    Patient/family educated on Medicare website which has current facility and service quality ratings:  NA  Education Provided on the Discharge Plan:  NA  Patient/Family in Agreement with the Plan:  NA    Referrals Placed by CM/SW:  NA  Private pay costs discussed: Not applicable    Additional Information:  Discussed patient in ICU rounds. RN reported pain management, patient needing fentanyl Q2H overnight. Plan to discuss and meet with family tomorrow.     Patient is not at her baseline mental status and does not have decisional capacity. Sisters involved. Adult children are not part of patient's life anymore.     Chart Reviewed - Comfort cares were recommended.     Social HX: Patient lives in [facility] (on bed hold). Bed bound at baseline, receives assistance for ADLs and IADLs.     CM will continue to follow care progression and aide in discharge planning as needed.     Shirin Romero RN       Patient was advised and in agreement they do meet Urgent Care criteria based on triage symptoms. Patient advised if doctor feels that their problem is more complex, they may be upgraded from an Urgent Care visit to an Emergency Department visit per MD direction.

## 2023-08-12 NOTE — PROGRESS NOTES
[facility]    Medicine Progress Note - Hospitalist Service    Date of Admission:  7/28/2023    Assessment & Plan   Ginna Mireles is a 67 year old female w/PMHx of T2DM on insulin, liver cirrhosis, HTN, HLD, CAD, COPD, remote EtOH use disorder, bipolar disorder, and chronic pain admitted on 7/28/2023 for lethargy and fevers, found to be septic with MSSA bacteremia and unknown source. LINO completed on 8/2/23 and did not show vegetations. MRI lumbar and thoracic with concern for epidural abscess. ID and Neurosurgery do not feel this requires surgical intervention. Will require prolonged course of outpatient IV antibiotics. Patient noted to have increasing oxygen needs, initially felt to be secondary to fluid overloaded status. Diuresed with some improvement but ongoing need for supplemental oxygen. Chest XR 8/6/23 revealed large partially loculated pleural effusion on the right. Pulmonology consulted and thoracentesis ordered. Patient intermittently refused interventions. After multiple attempts at reaching family, spoke with them on 8/7/23 at which time they state that in her current state Ginna does not have decisional capacity. They would like to proceed with thoracentesis. Thoracentesis 8/8/23 with only minimal fluid drained due to numerous septations and loculations. No improvement to respiratory distress following intervention. Chest tube placed on 8/8/23 and patient subsequently developed worsening respiratory distress and hypotension requiring pressors and transfer to ICU. ICU is currently primary team. Palliative now involved.CT removed 8/11, will not be replaced. ICU discussing comfort cares with family, planning to come tomorrow 8/13.            MSSA bacteremia  LINO without vegetations   MRI with concern for small epidural abscess  Loculated Pleural Effusion of Right Lung s/p thoracentesis and chest tube placement   Acute metabolic encephalopathy   Patient presented with fevers of 103  degrees and AMS. She is usually communicative and uses a walker at baseline; found down in facility. Positive blood culture x 4 days concerning for high grade MSSA bacteremia. TTE performed 7/29 did not show signs of vegetations. MRI lumbar and thoracic did reveal area of fluid collection concerning for possible epidural abscess, but ID and neurosurgery agree that this is not an operative infection. Patient with gradually increasing oxygen needs, chest XR obtained on 8/5/23 showed large loculated pleural effusion on the right. Pulmonology consulted and agree with thoracentesis. Thoracentesis performed on 8/8/2 with only minimal fluid out given extensive loculations. Chest tube therefore placed 8/8/23 with clinical deterioration thereafter. Transferred to the ICU for increasing oxygen needs and hypotension requiring pressors. ICU now primary. CT now removed, will not replace. Discussing comfort cares with family.               - ID consulted: cefazolin 2g Q8H, abx to about Sept 20 or so              - Neurosurg consulted: fitted with TLSO brace     Increasing Oxygen Needs   Pleural Effusion   Patient initially admitted for sepsis, concern for pneumonia as source as imaging on admission revealed consolidation in the right lower lobe in addition to small right pleural effusion. Has been on broad spectrum abx. Patient with slowly increasing oxygen needs coinciding with aggressive fluids for hypernatremia, see above. Some improvement with diuresis. Chest imaging showed loculated pleural effusion. Worsening respiratory status following above interventions. Chest imaging continues to show pleural effusion.               - ICU managing      Decision Making Capacity    Code Status  Discussed with sisters on 8/7/23. They visited the patient and feel she is not at her baseline mental status and does not have decisional capacity to refuse diagnostic or therapeutic interventions. Reportedly sister Sindy is decision maker. Per  chart review, ICU team has contacted patient about current status. Planning for goals of care discussion.               - Palliative consulted      Hypernatremia  Post-Obstructive Diuresis   Treatment Complicated by Fluid Status   Patient noted to have sodium levels of 150+ following admission. Felt to most likely be secondary to dry fluid status in the setting of initial urinary retention and significant post-operative diuresis. Hypernatremia improved with aggressive hypotonic maintenance fluids, but fluids discontinued in the setting of worsening edema and shortness of breath. SOB improved with furosemide, however sodium increased to 153. Will hold on further diuresis for now but will have to be thoughtful about continued maintenance fluids in the setting of increasing respiratory distress and newly identified worsening pleural effusion, see below.    - ICU currently managing      Type II Diabetes   Patient with known diagnosis of type II diabetes. She did have an AGMA on admission, but felt to be more likely 2/2 lactic acidosis. Home diabetes regimen is as follows: 34 units lantus HS + 13 units aspart at breakfast + 14 units aspart at lunch + 34 aspart at dinner + metformin + bydureon + canagliflozin. Hemoglobin A1c on this admission is 8.2. Sugars have continued to be difficult to control.              - Hold PTA metformin, bydureon, canagliflozin.               - 30 units lantus HS               - high resistance sliding scale regimen               - ICU managing     RLQ pain   H/o cirrhosis 2/2 EtOH use disorder   Transaminitis, improving   Per chart review, patient has remote h/o EtOH use disorder, likely causing cirrhosis. Patient endorsed pain in RLQ  on admission. CT abdomen also did not show ascites, which is reassuring.   - CMP daily  - ICU managing      DK - resolved   Cr 1.34 on admission, likely prerenal in setting of sepsis discussed above. Creatinine normalized.              - Daily CMP                - ICU managing      Hypokalemia  - RN replacement protocol     Elevated trops  Trop 37 and 35 on repeat on admission. Likely demand ischemia in setting of infection discussed above. EKG on admission w/o acute ischemic changes.      Constipation  - ICU managing      Chronic Conditions:  HTN   - Holding PTA lisinopril 5mg daily and hydrochlorothiazide 50mg daily      HLD  - Hold PTA pravastatin     Bipolar disorder    PTA meds include: lamotrigine 200mg PO BID, olanzapine 2.5mg PO daily, duloxetine 30mg PO daily, mirtazapine 45mg PO daily, rexulti 4mg PO daily               - ICU managing      Chronic pain  PTA regimen includes: Tylenol 500mg QID PRN, oxycodone 10mg Q4H PRN, flexeril 10mg TID PRN, tizanidine 2mg BID Q6H PRN              - ICU managing      Acquired hypothyroidism  - Continue PTA synthroid     Diet: NPO for Medical/Clinical Reasons Except for: No Exceptions  Adult Formula Drip Feeding: Continuous Vital 1.5; Nasogastric tube; Goal Rate: 50 ml/hr x 22 hrs (Hold 1 hr before and after levothyroxine); mL/hr; start at 10 ml/hr and increase 10 ml every 8 hrs to goal as tolerates    DVT Prophylaxis: Enoxaparin (Lovenox) SQ  [place] Catheter: PRESENT, indication: Retention;Obstruction, Strict 1-2 Hour I&O  Lines: PRESENT      PICC 08/10/23 Triple Lumen Right Basilic Vascular Access-Site Assessment: WDL    Cardiac Monitoring: None  Code Status: No CPR- Do NOT Intubate      Clinically Significant Risk Factors         # Hypernatremia: Highest Na = 147 mmol/L in last 2 days, will monitor as appropriate      # Hypoalbuminemia: Lowest albumin = 2 g/dL at 8/8/2023  6:40 AM, will monitor as appropriate       # Thrombocytopenia: Lowest platelets = 111 in last 2 days, will monitor for bleeding     # Hypertension: Noted on problem list       # DMII: A1C = 8.2 % (Ref range: <5.7 %) within past 6 months   # Obesity: Estimated body mass index is 37.09 kg/m  as calculated from the following:    Height as of this encounter:  "1.651 m (5' 5\").    Weight as of this encounter: 101.1 kg (222 lb 14.2 oz).     # Moderate Malnutrition: based on nutrition assessment         The patient's care was discussed with Dr Rosenstein.     Martin García MD PGY-2  [facility]    ______________________________________________________________________    Interval History   Per ICU note:  CT removed yesterday given not in pleural space, no plans for replacement.   Will not broaden antibiotics at this time given lack of fever/WBC and no evidence empyema.   Discussed comfort cares with family, planning to come to hospital tomorrow 8/13 but were encouraged to consider sooner transition should patient continue to deteriorate.     Physical Exam   Vital Signs: Temp: 98.6  F (37  C) Temp src: Oral BP: 98/53 Pulse: 74   Resp: 10 SpO2: 98 % O2 Device: Oxymask Oxygen Delivery: 3 LPM  Weight: 222 lbs 14.16 oz  GENERAL: Calm, does not open eyes to my voice.   EYES: Unable to exam.   RESP:  Oxymask in place. Poor air movement.   CV: Systolic murmur heard over right sternal border, radiated to clavicle. No murmur heard of mitral position. Regular rate.   Abdomen: Soft. Patient unable to endorse pain or tenderness.   MSK: Bilateral LE edematous.   SKIN: Dorsal surface of left foot with atypical ecchymosis across toes 2-4. Bilateral palmar surfaces of hands diffusely erythematous but non-tender.     Data     I have personally reviewed the following data over the past 24 hrs:    6.6  \   8.5 (L)   / 111 (L)     138 104 16.7 /  228 (H)   4.5 26 0.75 \     ALT: <5 AST: 44 AP: 364 (H) TBILI: 0.4   ALB: 2.6 (L) TOT PROTEIN: 6.3 (L) LIPASE: N/A       Imaging results reviewed over the past 24 hrs:   Recent Results (from the past 24 hour(s))   XR Chest Port 1 View    Narrative    EXAM: XR CHEST PORT 1 VIEW  LOCATION: [facility]  DATE: 8/12/2023    INDICATION: Chest tube placement  COMPARISON: 08/11/2023      Impression    IMPRESSION: " Enteric tube courses below the diaphragm extending beyond the field-of-view. A right PICC is unchanged in position. The small bore right-sided chest tube appears to have been removed. A moderate right-sided pleural effusion again is seen,   unchanged from prior exam. Mild worsening of right basilar atelectasis. No pneumothorax is appreciated. Partially visualized cervical and thoracolumbar fusion hardware. Surgical clips overlie the right quadrant.

## 2023-08-13 NOTE — PROGRESS NOTES
DEATH SUMMARY:    Called to bedside to pronounce patient at August 13, 2023 at 1420 PM    Patient is unresponsive to verbal or tactile stimuli.  The pupils are fixed and dilated bilaterally.  Apical pulse and heart sounds absent.  Lungs sounds absent.    Time of Death:  1420 on August 13, 2023     May you rest in peace,   DARRICK Salas, CNP  Pulmonary Critical Care  Pager: [phone]

## 2023-08-13 NOTE — PROGRESS NOTES
On Call  requested for pt put on comfort care. When I arrived, pt had already . Her four sisters were gathered in the room. Their one brother chose not to come.    Family did not verbalize much emotion. One sister seemed more acquainted with Ginna's numerous co-morbidities and relayed these to me. I was somewhat acquainted with the case through palliative care team.    I offered prayer and pastoral . The sisters are [employer]. They left immediately after I exited.    NITESH Pulido.  Palliative Care Team

## 2023-08-13 NOTE — PROGRESS NOTES
Patient body is cleared to be transported to [facility]. Family is taking a day to decide what  home they will use.       [id number] Patient to be transported by [facility] to [facility]. Family requested body be held until they make  home choice.  Sister Sindy will call nursing supervisor [phone].

## 2023-08-13 NOTE — DEATH PRONOUNCEMENT
DISCHARGE SUMMARY:    Ginna Mireles was a 67 year old female with past medical history significant for alcohol use, cirrhosis, bipolar disorder, COPD-emphysema, hypertension. Type 2 Diabetes, hypothyroidism, and spinal surgery.  Patient presented to [facility] on 7/20/23 from a nursing home with concerns for fevers, confusion, chills, and a recent fall.  She was found to have an MSSA blood stream infection, suspected lower extremity cellulitis, a right lower lobe pneumonia and loculated parapnuemonic effusion(requiring a chest tube and lytic therapy), acute hypoxic respiratory failure, shock,  and cervical epidural abscess.      Ginna's hospitalization was complicated by agitation, acute on chroni back pain, and need for blood pressure support with vasopressors.  A feeding tube was placed for nutrition and medications.  Her chest tube was removed, but although there was improvement of effusions, it was not fully resolved.  She would not have a been a surgical candidate for decortication if this were to again worsen.  She also would not have been a candidate for repeat spinal fusion due to her liver disease, and ongoing organ failure.  Ginna's sister's decided that Ginna would want to remain comfortable, and would not want further aggressive therapies.  They transitioned to comfort cares on 8/13.  Ginna passed today at 1420 with her sister's at her bedside.       Past Surgical History:   Procedure Laterality Date    CHOLECYSTECTOMY        HYSTERECTOMY        IR CHEST TUBE PLACEMENT NON-TUNNELED RIGHT   8/8/2023    NECK SURGERY        PICC SINGLE LUMEN PLACEMENT   8/4/2023    TONSILLECTOMY         Lab Results   Component Value Date    WBC 6.6 08/12/2023     Lab Results   Component Value Date    RBC 2.90 08/12/2023     Lab Results   Component Value Date    HGB 8.5 08/12/2023     Lab Results   Component Value Date    HCT 29.1 08/12/2023     No components found for: MCT  Lab Results   Component Value Date    MCV  100 08/12/2023     Lab Results   Component Value Date    MCH 29.3 08/12/2023     Lab Results   Component Value Date    MCHC 29.2 08/12/2023     Lab Results   Component Value Date    RDW 16.4 08/12/2023     Lab Results   Component Value Date     08/12/2023     Last Comprehensive Metabolic Panel:  Lab Results   Component Value Date     08/12/2023    POTASSIUM 4.5 08/12/2023    CHLORIDE 104 08/12/2023    CO2 26 08/12/2023    ANIONGAP 8 08/12/2023     (H) 08/13/2023    BUN 16.7 08/12/2023    CR 0.75 08/12/2023    GFRESTIMATED 87 08/12/2023    SAMMY 7.2 (L) 08/12/2023       Consulted Services:    Critical Care  [facility] Spiritual Health  Palliative Care  Orthopedics  Neurosurgery  Infectious Disease  Cardiology  Care Management   Nutritional Services  Interventional Radiology     DARRICK Correia, CNP  Pulmonary Critical Care  Securely Message Through Vocera Web Console

## 2023-08-13 NOTE — PROGRESS NOTES
ICU PROGRESS NOTE:        Assessment/Plan:     Changes for Today:      -patient's family is arriving this afternoon, and, at that point, plan to transition to comfort care approach    -added prn haldol to regime today as well, despite precedex, patient had a very long period of agitation overnight       Neuro:  Metabolic Encephalopathy-worsening  Acute on Chronic Pain  Concerns for epidural absscess:  Hx of Bipolar disorder:  In setting of liver disease, and septic shock patient has continued to require precedex and frequent pain control to allow for comfort  Becoming more liberal with pain med dosing as we transition to comfort  Patient remains on Cymbalta and Lamictal for bipolar   Per neurosurgery, fluid collection in lower cervical and proximal thoracic did not require intervention  Due to widening of T11-T12 disc space, and loose screw at T 11 (site of prior fusion), as well as fracture at T 11, patient should be wearing a brace when upright greater than 30 degrees, would require TLSO brace.  Patient has not be in place to be able to receive this information or be able to comply  Will liberate more of her pain meds as well as meds for anxiety control when family arrives    Pulmonary:  Acute hypoxemic respiratory failure  Suspected aspiration versus CAP  Right loculated parapneumonic effusion-CP chest tube and TPA  Continue Rocephin until goals of care change  No plans to intervene on loculated effusion further    CV:  Septic Shock-resolving  Hypotension, now likely in setting of narcotic pain meds and sedative med needs  MAP goal 65 or greater  No vegetation noted on LINO performed 8/2/23     GI:  Hx of cirrhosis-portal HTN  Protein Calorie Malnutrition   Appreciate dietary's assistance with tube feedings and patient's high risk of re-feeding syndrome  Stooling      Renal: DK-resolved, in setting of sepsis  Hypernatremia-resolved     ID:  MSSA bloodstream infection   ID's assistance on case  appreciated  Rocephin would be for 6 week duration if we continued with current cares    Heme/Coag: Chronic anemia  Chronic thrombocytopenia    Endocrine:  Hx of T2DM:  Hx of hypothyroidism:  Increase sliding scale insulin scale to high resistance today  Continue Lantus  Continue PTA Levothyroxine         ICU Prophylaxis:    PPI:  Not indicated     Peridex:  Not indicated     Anticoagulation/DVT Prophylaxis:  SCDs as tolerated   No pharmacologic intervention in the setting of thrombocytopenia           Lines/Drains/Tubes:    PICC LINE   NG/OG/NJ Tube Nasogastric 12 fr Right nostril  Urethral Catheter 08/10/23      RESTRAINTS   Non-Violent:  Type of Restraint: Four side rails    Behavior: Pulling at IVand sol catheter tubings.   Root cause of behavior: Critical illness.   Less-restrictive methods that have failed: Redirection, reorientation.  Response to restraint: Not actively pulling atcurrent restraints.   Criteria for release from restraint: Responds to redirection. Leaves medical devices in place.          CODE STATUS:  No CPR, Do NOT Intubate       SUBJECTIVE:    Ccx:  Unable to access, given current mental status     HPI:  67yoF with MSSA bacteremia, small cervical epidural abscess deemed not necessary for evacuation by neurosurgery, Cirrhosis, bipolar disorder who has now developed large right-sided pleural effusion with worsening hypoxia that is loculated and now s/p chest tube. Worsening septic shock post-chest tube placement requirement vasopressor with significant worsening of encephalopathy.         History reviewed. No pertinent past medical history.  Past Surgical History:   Procedure Laterality Date    CHOLECYSTECTOMY      HYSTERECTOMY      IR CHEST TUBE PLACEMENT NON-TUNNELED RIGHT  8/8/2023    NECK SURGERY      PICC SINGLE LUMEN PLACEMENT  8/4/2023    TONSILLECTOMY       Current Facility-Administered Medications   Medication    acetaminophen (TYLENOL) Suppository 650 mg    acetaminophen (TYLENOL)  tablet 500 mg    bisacodyl (DULCOLAX) EC tablet 5 mg    Or    bisacodyl (DULCOLAX) EC tablet 10 mg    ceFAZolin (ANCEF) 2 g in 100 mL D5W intermittent infusion    dexmedeTOMIDine (PRECEDEX) 4 mcg/mL in NS infusion    dextrose 10% infusion    glucose gel 15-30 g    Or    dextrose 50 % injection 25-50 mL    Or    glucagon injection 1 mg    glucose gel 15-30 g    Or    dextrose 50 % injection 25-50 mL    Or    glucagon injection 1 mg    glucose gel 15-30 g    Or    dextrose 50 % injection 25-50 mL    Or    glucagon injection 1 mg    DULoxetine (CYMBALTA) oral suspension 30 mg    haloperidol (HALDOL) 2 MG/ML (HIGH CONC) solution 2 mg    HYDROmorphone (DILAUDID) injection 0.2-0.5 mg    insulin aspart (NovoLOG) injection (RAPID ACTING)    insulin aspart (NovoLOG) injection (RAPID ACTING)    insulin glargine (LANTUS PEN) injection 25 Units    insulin glargine (LANTUS PEN) injection 5 Units    lamoTRIgine (LaMICtal) tablet 200 mg    levothyroxine (SYNTHROID/LEVOTHROID) tablet 137 mcg    lidocaine 1 % 0.1-1 mL    [Held by provider] lisinopril (ZESTRIL) tablet 2.5 mg    melatonin tablet 1 mg    menthol-zinc oxide (CALMOSEPTINE) 0.44-20.6 % ointment OINT    miconazole (MICATIN) 2 % powder    multivitamins w/minerals liquid 15 mL    naloxone (NARCAN) injection 0.2 mg    Or    naloxone (NARCAN) injection 0.4 mg    Or    naloxone (NARCAN) injection 0.2 mg    Or    naloxone (NARCAN) injection 0.4 mg    norepinephrine (LEVOPHED) 4 mg in  mL infusion PREMIX    OLANZapine (zyPREXA) suspension 2.5 mg    ondansetron (ZOFRAN ODT) ODT tab 4 mg    Or    ondansetron (ZOFRAN) injection 4 mg    oxyCODONE (ROXICODONE) tablet 10 mg    prochlorperazine (COMPAZINE) injection 5 mg    Or    prochlorperazine (COMPAZINE) tablet 5 mg    Or    prochlorperazine (COMPAZINE) suppository 12.5 mg    sodium chloride (PF) 0.9% PF flush 10-40 mL    sodium chloride (PF) 0.9% PF flush 10-40 mL    sodium chloride (PF) 0.9% PF flush 3 mL    sodium chloride  (PF) 0.9% PF flush 3 mL        Allergies   Allergen Reactions    Hydroxyzine     Mushroom     Penicillins      Hives      Tamiflu [Oseltamivir]      Family History   Problem Relation Age of Onset    Cataracts Mother     Lung Cancer Father     HIV/AIDS Brother     Diabetes Sister          PHYSICAL ASSESSMENT:  General: Lethargic, intermittent agitation   Lungs:  Diminished in bases, fine crackles in bases.   Heart: RRR, S1 and S2   Abdomen: Soft, non-tender.  Bowel sounds present X 4 quadrants.  Skin: skin color normal, texture normal, no rashes or lesions.   Extremities:  +2 pitting edema, BLE   Pulses:  +2 BUE and +2 BLE  Neuro:  difficult to fully access,  intermittent agitation, not following commands, DAWKINS     Temp: 97.7  F (36.5  C) Temp src: Oral BP: 103/55 Pulse: 75   Resp: 11 SpO2: 96 % O2 Device: Oxymask Oxygen Delivery: 3 LPM        Intake/Output Summary (Last 24 hours) at 8/13/2023 0910  Last data filed at 8/13/2023 0808  Gross per 24 hour   Intake 2334.31 ml   Output 1575 ml   Net 759.31 ml     Temp: 97.7  F (36.5  C) Temp src: Oral BP: 103/55 Pulse: 75   Resp: 11 SpO2: 96 % O2 Device: Oxymask Oxygen Delivery: 3 LPM      Lab Results   Component Value Date    WBC 6.6 08/12/2023     Lab Results   Component Value Date    RBC 2.90 08/12/2023     Lab Results   Component Value Date    HGB 8.5 08/12/2023     Lab Results   Component Value Date    HCT 29.1 08/12/2023     No components found for: MCT  Lab Results   Component Value Date     08/12/2023     Lab Results   Component Value Date    MCH 29.3 08/12/2023     Lab Results   Component Value Date    MCHC 29.2 08/12/2023     Lab Results   Component Value Date    RDW 16.4 08/12/2023     Lab Results   Component Value Date     08/12/2023       Last Comprehensive Metabolic Panel:  Sodium   Date Value Ref Range Status   08/12/2023 138 136 - 145 mmol/L Final     Potassium   Date Value Ref Range Status   08/12/2023 4.5 3.4 - 5.3 mmol/L Final     Chloride    Date Value Ref Range Status   08/12/2023 104 98 - 107 mmol/L Final     Carbon Dioxide (CO2)   Date Value Ref Range Status   08/12/2023 26 22 - 29 mmol/L Final     Anion Gap   Date Value Ref Range Status   08/12/2023 8 7 - 15 mmol/L Final     GLUCOSE BY METER POCT   Date Value Ref Range Status   08/13/2023 235 (H) 70 - 99 mg/dL Final     Urea Nitrogen   Date Value Ref Range Status   08/12/2023 16.7 8.0 - 23.0 mg/dL Final     Creatinine   Date Value Ref Range Status   08/12/2023 0.75 0.51 - 0.95 mg/dL Final     GFR Estimate   Date Value Ref Range Status   08/12/2023 87 >60 mL/min/1.73m2 Final     Calcium   Date Value Ref Range Status   08/12/2023 7.2 (L) 8.8 - 10.2 mg/dL Final       Last Arterial Blood Gas:  pH Arterial   Date Value Ref Range Status   08/09/2023 7.37 7.37 - 7.44 Final     pCO2 Arterial   Date Value Ref Range Status   08/09/2023 52 (H) 35 - 45 mm Hg Final     pO2 Arterial   Date Value Ref Range Status   08/09/2023 134 (H) 75 - 85 mm Hg Final     Bicarbonate Arterial   Date Value Ref Range Status   08/09/2023 30 (H) 23 - 29 mmol/L Final     O2 Sat, Arterial   Date Value Ref Range Status   08/09/2023 99.5 (H) 95.0 - 96.0 % Final     Base Excess/Deficit (+/-)   Date Value Ref Range Status   08/09/2023 4.3   mmol/L Final           Reviewed imaging from this encounter.       DARRICK Correia, CNP  Pulmonary Critical Care  Please contact through [employer] Secure Application     Time Billed:  45 minutes of critical care time.    Patient requiring ICU level of care: Patient remain on precedex infusion for agitation and pain management augmentation.

## 2023-08-13 NOTE — PROGRESS NOTES
Care Management Follow Up    Length of Stay (days): 16    Expected Discharge Date: 08/16/2023     Concerns to be Addressed:     Care Progression/ Discharge planning  Patient plan of care discussed at interdisciplinary rounds: Yes    Anticipated Discharge Disposition:  TBD - from [facility]     Anticipated Discharge Services:  NA  Anticipated Discharge DME:  SEAMSU    Patient/family educated on Medicare website which has current facility and service quality ratings:  NA  Education Provided on the Discharge Plan:  NA  Patient/Family in Agreement with the Plan:  NA    Referrals Placed by CM/SW:  NA  Private pay costs discussed: Not applicable    Additional Information:  Discussed patient in ICU rounds. Plan for family to visit today, plan to transition to comfort.     CM to follow up on patients status and discuss community hospice.     Patient is not at her baseline mental status and does not have decisional capacity. Sisters involved. Adult children are not part of patient's life anymore.     Social HX: Patient lives in [facility] (on bed hold). Bed bound at baseline, receives assistance for ADLs and IADLs.     CM will continue to follow care progression and aide in discharge planning as needed.     2:30 PM- Chart reviewed. Patient passed @1420. RNCM placed call to [facility] to provide update on patient. Left voicemail for admissions.     Shirin Romero RN

## 2023-08-13 NOTE — PROGRESS NOTES
Patient's sisters have arrived, including Sindy Wade, designated decision maker.  Together as a family, they have all decided to transition to comfort care.  They asked that iGnna's feeding tube be removed as well as her oxygen mask.    Comfort care orders entered including IV dilaudid, IV haldol, and IV ativan for symptom management.    DARRICK Correia, CNP  Pulmonary Critical Care  Securely Message Through Vocera Web Console

## 2023-09-05 LAB — BACTERIA PLR CULT: NO GROWTH

## 2023-09-08 NOTE — DISCHARGE SUMMARY
DISCHARGE SUMMARY:     Ginna Mireles was a 67 year old female with past medical history significant for alcohol use, cirrhosis, bipolar disorder, COPD-emphysema, hypertension. Type 2 Diabetes, hypothyroidism, and spinal surgery.  Patient presented to [facility] on 7/20/23 from a nursing home with concerns for fevers, confusion, chills, and a recent fall.  She was found to have an MSSA blood stream infection, suspected lower extremity cellulitis, a right lower lobe pneumonia and loculated parapnuemonic effusion(requiring a chest tube and lytic therapy), acute hypoxic respiratory failure, shock,  and cervical epidural abscess.       Ginna's hospitalization was complicated by agitation, acute on chroni back pain, and need for blood pressure support with vasopressors.  A feeding tube was placed for nutrition and medications.  Her chest tube was removed, but although there was improvement of effusions, it was not fully resolved.  She would not have a been a surgical candidate for decortication if this were to again worsen.  She also would not have been a candidate for repeat spinal fusion due to her liver disease, and ongoing organ failure.  Ginna's sister's decided that Ginna would want to remain comfortable, and would not want further aggressive therapies.  They transitioned to comfort cares on 8/13.  Ginna passed today at 1420 with her sister's at her bedside.               Past Surgical History:   Procedure Laterality Date    CHOLECYSTECTOMY        HYSTERECTOMY        IR CHEST TUBE PLACEMENT NON-TUNNELED RIGHT   8/8/2023    NECK SURGERY        PICC SINGLE LUMEN PLACEMENT   8/4/2023    TONSILLECTOMY                   Lab Results   Component Value Date     WBC 6.6 08/12/2023            Lab Results   Component Value Date     RBC 2.90 08/12/2023            Lab Results   Component Value Date     HGB 8.5 08/12/2023            Lab Results   Component Value Date     HCT 29.1 08/12/2023      No components found  for: MCT        Lab Results   Component Value Date      08/12/2023            Lab Results   Component Value Date     MCH 29.3 08/12/2023            Lab Results   Component Value Date     MCHC 29.2 08/12/2023            Lab Results   Component Value Date     RDW 16.4 08/12/2023            Lab Results   Component Value Date      08/12/2023      Last Comprehensive Metabolic Panel:        Lab Results   Component Value Date      08/12/2023     POTASSIUM 4.5 08/12/2023     CHLORIDE 104 08/12/2023     CO2 26 08/12/2023     ANIONGAP 8 08/12/2023      (H) 08/13/2023     BUN 16.7 08/12/2023     CR 0.75 08/12/2023     GFRESTIMATED 87 08/12/2023     SAMMY 7.2 (L) 08/12/2023         Consulted Services:     Critical Care  [facility] Spiritual [facility]  Palliative Care  Orthopedics  Neurosurgery  Infectious Disease  Cardiology  Care Management   Nutritional Services  Interventional Radiology      Aleta Mendenhall APRN, CNP  Pulmonary Critical Care  Securely Message Through Vocera Web Console

## 2023-10-04 LAB
ACID FAST STAIN (ARUP): NORMAL

## (undated) RX ORDER — FENTANYL CITRATE 50 UG/ML
INJECTION, SOLUTION INTRAMUSCULAR; INTRAVENOUS
Status: DISPENSED
Start: 2023-01-01

## (undated) RX ORDER — LIDOCAINE HYDROCHLORIDE 10 MG/ML
INJECTION, SOLUTION INFILTRATION; PERINEURAL
Status: DISPENSED
Start: 2023-01-01